# Patient Record
Sex: FEMALE | Race: OTHER | NOT HISPANIC OR LATINO | Employment: OTHER | URBAN - METROPOLITAN AREA
[De-identification: names, ages, dates, MRNs, and addresses within clinical notes are randomized per-mention and may not be internally consistent; named-entity substitution may affect disease eponyms.]

---

## 2017-05-26 ENCOUNTER — HOSPITAL ENCOUNTER (EMERGENCY)
Facility: HOSPITAL | Age: 67
Discharge: HOME | End: 2017-05-26
Attending: EMERGENCY MEDICINE | Admitting: EMERGENCY MEDICINE

## 2017-05-26 ENCOUNTER — APPOINTMENT (OUTPATIENT)
Dept: CT IMAGING | Facility: HOSPITAL | Age: 67
End: 2017-05-26

## 2017-05-26 VITALS
OXYGEN SATURATION: 97 % | HEIGHT: 62 IN | WEIGHT: 150 LBS | DIASTOLIC BLOOD PRESSURE: 77 MMHG | RESPIRATION RATE: 16 BRPM | TEMPERATURE: 98.6 F | SYSTOLIC BLOOD PRESSURE: 108 MMHG | HEART RATE: 72 BPM | BODY MASS INDEX: 27.6 KG/M2

## 2017-05-26 DIAGNOSIS — J10.1 INFLUENZA B: Primary | ICD-10-CM

## 2017-05-26 DIAGNOSIS — R51.9 HEADACHE, UNSPECIFIED HEADACHE TYPE: ICD-10-CM

## 2017-05-26 LAB
ALBUMIN SERPL-MCNC: 4.5 G/DL (ref 3.5–5.2)
ALBUMIN/GLOB SERPL: 1.2 G/DL
ALP SERPL-CCNC: 114 U/L (ref 39–117)
ALT SERPL W P-5'-P-CCNC: 23 U/L (ref 1–33)
ANION GAP SERPL CALCULATED.3IONS-SCNC: 15.6 MMOL/L
APPEARANCE CSF: CLEAR
AST SERPL-CCNC: 28 U/L (ref 1–32)
B PERT DNA SPEC QL NAA+PROBE: NOT DETECTED
BILIRUB SERPL-MCNC: 0.3 MG/DL (ref 0.1–1.2)
BILIRUB UR QL STRIP: NEGATIVE
BUN BLD-MCNC: 12 MG/DL (ref 8–23)
BUN/CREAT SERPL: 11.5 (ref 7–25)
C PNEUM DNA NPH QL NAA+NON-PROBE: NOT DETECTED
CALCIUM SPEC-SCNC: 10.2 MG/DL (ref 8.6–10.5)
CHLORIDE SERPL-SCNC: 96 MMOL/L (ref 98–107)
CLARITY UR: CLEAR
CO2 SERPL-SCNC: 25.4 MMOL/L (ref 22–29)
COLOR CSF: COLORLESS
COLOR UR: YELLOW
CREAT BLD-MCNC: 1.04 MG/DL (ref 0.57–1)
D-LACTATE SERPL-SCNC: 1.1 MMOL/L (ref 0.5–2)
DEPRECATED RDW RBC AUTO: 45.1 FL (ref 37–54)
ERYTHROCYTE [DISTWIDTH] IN BLOOD BY AUTOMATED COUNT: 13.7 % (ref 11.7–13)
FLUAV H1 2009 PAND RNA NPH QL NAA+PROBE: NOT DETECTED
FLUAV H1 HA GENE NPH QL NAA+PROBE: NOT DETECTED
FLUAV H3 RNA NPH QL NAA+PROBE: NOT DETECTED
FLUAV SUBTYP SPEC NAA+PROBE: NOT DETECTED
FLUBV RNA ISLT QL NAA+PROBE: DETECTED
GFR SERPL CREATININE-BSD FRML MDRD: 53 ML/MIN/1.73
GFR SERPL CREATININE-BSD FRML MDRD: 64 ML/MIN/1.73
GLOBULIN UR ELPH-MCNC: 3.9 GM/DL
GLUCOSE BLD-MCNC: 108 MG/DL (ref 65–99)
GLUCOSE CSF-MCNC: 64 MG/DL (ref 40–70)
GLUCOSE UR STRIP-MCNC: NEGATIVE MG/DL
HADV DNA SPEC NAA+PROBE: NOT DETECTED
HCOV 229E RNA SPEC QL NAA+PROBE: NOT DETECTED
HCOV HKU1 RNA SPEC QL NAA+PROBE: NOT DETECTED
HCOV NL63 RNA SPEC QL NAA+PROBE: NOT DETECTED
HCOV OC43 RNA SPEC QL NAA+PROBE: NOT DETECTED
HCT VFR BLD AUTO: 42.1 % (ref 35.6–45.5)
HGB BLD-MCNC: 14 G/DL (ref 11.9–15.5)
HGB UR QL STRIP.AUTO: NEGATIVE
HMPV RNA NPH QL NAA+NON-PROBE: NOT DETECTED
HPIV1 RNA SPEC QL NAA+PROBE: NOT DETECTED
HPIV2 RNA SPEC QL NAA+PROBE: NOT DETECTED
HPIV3 RNA NPH QL NAA+PROBE: NOT DETECTED
HPIV4 P GENE NPH QL NAA+PROBE: NOT DETECTED
KETONES UR QL STRIP: ABNORMAL
LEUKOCYTE ESTERASE UR QL STRIP.AUTO: NEGATIVE
LYMPHOCYTES # BLD MANUAL: 0.68 10*3/MM3 (ref 0.9–4.8)
LYMPHOCYTES NFR BLD MANUAL: 11 % (ref 19.6–45.3)
LYMPHOCYTES NFR BLD MANUAL: 19 % (ref 5–12)
M PNEUMO IGG SER IA-ACNC: NOT DETECTED
MCH RBC QN AUTO: 30.4 PG (ref 26.9–32)
MCHC RBC AUTO-ENTMCNC: 33.3 G/DL (ref 32.4–36.3)
MCV RBC AUTO: 91.3 FL (ref 80.5–98.2)
METHOD: ABNORMAL
MONOCYTES # BLD AUTO: 1.17 10*3/MM3 (ref 0.2–1.2)
NEUTROPHILS # BLD AUTO: 4.32 10*3/MM3 (ref 1.9–8.1)
NEUTROPHILS NFR BLD MANUAL: 70 % (ref 42.7–76)
NITRITE UR QL STRIP: NEGATIVE
NUC CELL # CSF MANUAL: 1 /MM3 (ref 0–5)
PH UR STRIP.AUTO: 6 [PH] (ref 5–8)
PLAT MORPH BLD: NORMAL
PLATELET # BLD AUTO: 220 10*3/MM3 (ref 140–500)
PMV BLD AUTO: 10.7 FL (ref 6–12)
POTASSIUM BLD-SCNC: 4.3 MMOL/L (ref 3.5–5.2)
PROCALCITONIN SERPL-MCNC: 0.11 NG/ML (ref 0.1–0.25)
PROT CSF-MCNC: 28 MG/DL (ref 15–45)
PROT SERPL-MCNC: 8.4 G/DL (ref 6–8.5)
PROT UR QL STRIP: NEGATIVE
RBC # BLD AUTO: 4.61 10*6/MM3 (ref 3.9–5.2)
RBC # CSF MANUAL: 74 /MM3 (ref 0–0)
RBC MORPH BLD: NORMAL
RHINOVIRUS RNA SPEC NAA+PROBE: NOT DETECTED
RSV RNA NPH QL NAA+NON-PROBE: NOT DETECTED
SCAN SLIDE: NORMAL
SODIUM BLD-SCNC: 137 MMOL/L (ref 136–145)
SP GR UR STRIP: 1.01 (ref 1–1.03)
TUBE # CSF: 4
UROBILINOGEN UR QL STRIP: ABNORMAL
WBC MORPH BLD: NORMAL
WBC NRBC COR # BLD: 6.17 10*3/MM3 (ref 4.5–10.7)

## 2017-05-26 PROCEDURE — 87015 SPECIMEN INFECT AGNT CONCNTJ: CPT | Performed by: EMERGENCY MEDICINE

## 2017-05-26 PROCEDURE — 70450 CT HEAD/BRAIN W/O DYE: CPT

## 2017-05-26 PROCEDURE — 96361 HYDRATE IV INFUSION ADD-ON: CPT

## 2017-05-26 PROCEDURE — 96360 HYDRATION IV INFUSION INIT: CPT

## 2017-05-26 PROCEDURE — 85025 COMPLETE CBC W/AUTO DIFF WBC: CPT | Performed by: EMERGENCY MEDICINE

## 2017-05-26 PROCEDURE — 89050 BODY FLUID CELL COUNT: CPT | Performed by: EMERGENCY MEDICINE

## 2017-05-26 PROCEDURE — 84145 PROCALCITONIN (PCT): CPT | Performed by: EMERGENCY MEDICINE

## 2017-05-26 PROCEDURE — 80053 COMPREHEN METABOLIC PANEL: CPT | Performed by: EMERGENCY MEDICINE

## 2017-05-26 PROCEDURE — 87581 M.PNEUMON DNA AMP PROBE: CPT | Performed by: EMERGENCY MEDICINE

## 2017-05-26 PROCEDURE — 81003 URINALYSIS AUTO W/O SCOPE: CPT | Performed by: EMERGENCY MEDICINE

## 2017-05-26 PROCEDURE — 87633 RESP VIRUS 12-25 TARGETS: CPT | Performed by: EMERGENCY MEDICINE

## 2017-05-26 PROCEDURE — 87205 SMEAR GRAM STAIN: CPT | Performed by: EMERGENCY MEDICINE

## 2017-05-26 PROCEDURE — 87798 DETECT AGENT NOS DNA AMP: CPT | Performed by: EMERGENCY MEDICINE

## 2017-05-26 PROCEDURE — 87486 CHLMYD PNEUM DNA AMP PROBE: CPT | Performed by: EMERGENCY MEDICINE

## 2017-05-26 PROCEDURE — 99284 EMERGENCY DEPT VISIT MOD MDM: CPT

## 2017-05-26 PROCEDURE — 85007 BL SMEAR W/DIFF WBC COUNT: CPT | Performed by: EMERGENCY MEDICINE

## 2017-05-26 PROCEDURE — 83605 ASSAY OF LACTIC ACID: CPT | Performed by: EMERGENCY MEDICINE

## 2017-05-26 PROCEDURE — 87070 CULTURE OTHR SPECIMN AEROBIC: CPT | Performed by: EMERGENCY MEDICINE

## 2017-05-26 PROCEDURE — 82945 GLUCOSE OTHER FLUID: CPT | Performed by: EMERGENCY MEDICINE

## 2017-05-26 PROCEDURE — 84157 ASSAY OF PROTEIN OTHER: CPT | Performed by: EMERGENCY MEDICINE

## 2017-05-26 RX ORDER — ACETAMINOPHEN 500 MG
1000 TABLET ORAL ONCE
Status: COMPLETED | OUTPATIENT
Start: 2017-05-26 | End: 2017-05-26

## 2017-05-26 RX ORDER — SODIUM CHLORIDE 0.9 % (FLUSH) 0.9 %
10 SYRINGE (ML) INJECTION AS NEEDED
Status: DISCONTINUED | OUTPATIENT
Start: 2017-05-26 | End: 2017-05-26 | Stop reason: HOSPADM

## 2017-05-26 RX ORDER — SODIUM CHLORIDE 9 MG/ML
125 INJECTION, SOLUTION INTRAVENOUS CONTINUOUS
Status: DISCONTINUED | OUTPATIENT
Start: 2017-05-26 | End: 2017-05-26 | Stop reason: HOSPADM

## 2017-05-26 RX ADMIN — ACETAMINOPHEN 1000 MG: 500 TABLET ORAL at 13:09

## 2017-05-26 RX ADMIN — SODIUM CHLORIDE 125 ML/HR: 9 INJECTION, SOLUTION INTRAVENOUS at 13:05

## 2017-05-29 LAB
BACTERIA SPEC AEROBE CULT: NORMAL
GRAM STN SPEC: NORMAL

## 2017-06-19 ENCOUNTER — TRANSCRIBE ORDERS (OUTPATIENT)
Dept: ADMINISTRATIVE | Facility: HOSPITAL | Age: 67
End: 2017-06-19

## 2017-06-19 ENCOUNTER — HOSPITAL ENCOUNTER (OUTPATIENT)
Dept: CT IMAGING | Facility: HOSPITAL | Age: 67
Discharge: HOME | End: 2017-06-19
Admitting: FAMILY MEDICINE

## 2017-06-19 DIAGNOSIS — C22.1 CHOLANGIOCARCINOMA OF BILIARY TRACT (HCC): Primary | ICD-10-CM

## 2017-06-19 DIAGNOSIS — C22.1 CHOLANGIOCARCINOMA OF BILIARY TRACT (HCC): ICD-10-CM

## 2017-06-19 PROCEDURE — 0 DIATRIZOATE MEGLUMINE & SODIUM PER 1 ML: Performed by: FAMILY MEDICINE

## 2017-06-19 PROCEDURE — 74160 CT ABDOMEN W/CONTRAST: CPT

## 2017-06-19 PROCEDURE — 82565 ASSAY OF CREATININE: CPT

## 2017-06-19 PROCEDURE — 0 IOPAMIDOL 61 % SOLUTION: Performed by: FAMILY MEDICINE

## 2017-06-19 RX ADMIN — DIATRIZOATE MEGLUMINE AND DIATRIZOATE SODIUM 30 ML: 660; 100 LIQUID ORAL; RECTAL at 13:13

## 2017-06-19 RX ADMIN — IOPAMIDOL 85 ML: 612 INJECTION, SOLUTION INTRAVENOUS at 13:13

## 2017-06-20 ENCOUNTER — TRANSCRIBE ORDERS (OUTPATIENT)
Dept: ADMINISTRATIVE | Facility: HOSPITAL | Age: 67
End: 2017-06-20

## 2017-06-20 DIAGNOSIS — C22.1 MALIGNANT NEOPLASM OF INTRAHEPATIC BILE DUCTS (HCC): Primary | ICD-10-CM

## 2017-06-20 LAB — CREAT BLDA-MCNC: 0.9 MG/DL (ref 0.6–1.3)

## 2017-09-04 ENCOUNTER — HOSPITAL ENCOUNTER (OUTPATIENT)
Dept: GENERAL RADIOLOGY | Facility: HOSPITAL | Age: 67
Discharge: HOME | End: 2017-09-04
Admitting: FAMILY MEDICINE

## 2017-09-04 DIAGNOSIS — T14.8XXA CONTUSION OF CLAVICLE, RIGHT, INITIAL ENCOUNTER: ICD-10-CM

## 2017-09-04 PROCEDURE — 73030 X-RAY EXAM OF SHOULDER: CPT

## 2017-09-05 ENCOUNTER — TRANSCRIBE ORDERS (OUTPATIENT)
Dept: ADMINISTRATIVE | Facility: HOSPITAL | Age: 67
End: 2017-09-05

## 2017-09-05 DIAGNOSIS — S46.021A: Primary | ICD-10-CM

## 2017-09-08 ENCOUNTER — HOSPITAL ENCOUNTER (OUTPATIENT)
Dept: MRI IMAGING | Facility: HOSPITAL | Age: 67
Discharge: HOME | End: 2017-09-08
Admitting: FAMILY MEDICINE

## 2017-09-08 DIAGNOSIS — S46.021A: ICD-10-CM

## 2017-09-08 PROCEDURE — 73221 MRI JOINT UPR EXTREM W/O DYE: CPT

## 2017-09-11 ENCOUNTER — APPOINTMENT (OUTPATIENT)
Dept: MRI IMAGING | Facility: HOSPITAL | Age: 67
End: 2017-09-11

## 2017-09-22 ENCOUNTER — TRANSCRIBE ORDERS (OUTPATIENT)
Dept: LAB | Facility: HOSPITAL | Age: 67
End: 2017-09-22

## 2017-09-22 ENCOUNTER — HOSPITAL ENCOUNTER (OUTPATIENT)
Dept: CARDIOLOGY | Facility: HOSPITAL | Age: 67
Discharge: HOME | End: 2017-09-22
Attending: ORTHOPAEDIC SURGERY | Admitting: ORTHOPAEDIC SURGERY

## 2017-09-22 ENCOUNTER — LAB (OUTPATIENT)
Dept: LAB | Facility: HOSPITAL | Age: 67
End: 2017-09-22
Attending: ORTHOPAEDIC SURGERY

## 2017-09-22 DIAGNOSIS — Z01.818 PRE-OP EXAM: ICD-10-CM

## 2017-09-22 DIAGNOSIS — M25.519 SHOULDER PAIN, UNSPECIFIED CHRONICITY, UNSPECIFIED LATERALITY: Primary | ICD-10-CM

## 2017-09-22 DIAGNOSIS — M25.519 SHOULDER PAIN, UNSPECIFIED CHRONICITY, UNSPECIFIED LATERALITY: ICD-10-CM

## 2017-09-22 LAB
ALBUMIN SERPL-MCNC: 4.5 G/DL (ref 3.5–5.2)
ALBUMIN/GLOB SERPL: 1.6 G/DL
ALP SERPL-CCNC: 106 U/L (ref 39–117)
ALT SERPL W P-5'-P-CCNC: 19 U/L (ref 1–33)
ANION GAP SERPL CALCULATED.3IONS-SCNC: 14.6 MMOL/L
AST SERPL-CCNC: 21 U/L (ref 1–32)
BASOPHILS # BLD AUTO: 0.02 10*3/MM3 (ref 0–0.2)
BASOPHILS NFR BLD AUTO: 0.3 % (ref 0–1.5)
BILIRUB SERPL-MCNC: 0.5 MG/DL (ref 0.1–1.2)
BUN BLD-MCNC: 15 MG/DL (ref 8–23)
BUN/CREAT SERPL: 14.9 (ref 7–25)
CALCIUM SPEC-SCNC: 10.3 MG/DL (ref 8.6–10.5)
CHLORIDE SERPL-SCNC: 99 MMOL/L (ref 98–107)
CO2 SERPL-SCNC: 25.4 MMOL/L (ref 22–29)
CREAT BLD-MCNC: 1.01 MG/DL (ref 0.57–1)
DEPRECATED RDW RBC AUTO: 43.6 FL (ref 37–54)
EOSINOPHIL # BLD AUTO: 0.33 10*3/MM3 (ref 0–0.7)
EOSINOPHIL NFR BLD AUTO: 4.6 % (ref 0.3–6.2)
ERYTHROCYTE [DISTWIDTH] IN BLOOD BY AUTOMATED COUNT: 13.3 % (ref 11.7–13)
GFR SERPL CREATININE-BSD FRML MDRD: 55 ML/MIN/1.73
GFR SERPL CREATININE-BSD FRML MDRD: 66 ML/MIN/1.73
GLOBULIN UR ELPH-MCNC: 2.9 GM/DL
GLUCOSE BLD-MCNC: 90 MG/DL (ref 65–99)
HCT VFR BLD AUTO: 38.9 % (ref 35.6–45.5)
HGB BLD-MCNC: 12.7 G/DL (ref 11.9–15.5)
IMM GRANULOCYTES # BLD: 0.03 10*3/MM3 (ref 0–0.03)
IMM GRANULOCYTES NFR BLD: 0.4 % (ref 0–0.5)
LYMPHOCYTES # BLD AUTO: 2.22 10*3/MM3 (ref 0.9–4.8)
LYMPHOCYTES NFR BLD AUTO: 31.1 % (ref 19.6–45.3)
MCH RBC QN AUTO: 29.2 PG (ref 26.9–32)
MCHC RBC AUTO-ENTMCNC: 32.6 G/DL (ref 32.4–36.3)
MCV RBC AUTO: 89.4 FL (ref 80.5–98.2)
MONOCYTES # BLD AUTO: 0.93 10*3/MM3 (ref 0.2–1.2)
MONOCYTES NFR BLD AUTO: 13 % (ref 5–12)
NEUTROPHILS # BLD AUTO: 3.61 10*3/MM3 (ref 1.9–8.1)
NEUTROPHILS NFR BLD AUTO: 50.6 % (ref 42.7–76)
PLATELET # BLD AUTO: 290 10*3/MM3 (ref 140–500)
PMV BLD AUTO: 10.1 FL (ref 6–12)
POTASSIUM BLD-SCNC: 4.5 MMOL/L (ref 3.5–5.2)
PROT SERPL-MCNC: 7.4 G/DL (ref 6–8.5)
RBC # BLD AUTO: 4.35 10*6/MM3 (ref 3.9–5.2)
SODIUM BLD-SCNC: 139 MMOL/L (ref 136–145)
WBC NRBC COR # BLD: 7.14 10*3/MM3 (ref 4.5–10.7)

## 2017-09-22 PROCEDURE — 36415 COLL VENOUS BLD VENIPUNCTURE: CPT

## 2017-09-22 PROCEDURE — 80053 COMPREHEN METABOLIC PANEL: CPT

## 2017-09-22 PROCEDURE — 85025 COMPLETE CBC W/AUTO DIFF WBC: CPT

## 2017-09-22 PROCEDURE — 93010 ELECTROCARDIOGRAM REPORT: CPT | Performed by: INTERNAL MEDICINE

## 2017-09-22 PROCEDURE — 93005 ELECTROCARDIOGRAM TRACING: CPT | Performed by: ORTHOPAEDIC SURGERY

## 2017-09-26 RX ORDER — CEFAZOLIN SODIUM 2 G/100ML
2 INJECTION, SOLUTION INTRAVENOUS ONCE
Status: COMPLETED | OUTPATIENT
Start: 2017-09-26 | End: 2017-09-28

## 2017-09-28 ENCOUNTER — ANESTHESIA (OUTPATIENT)
Dept: PERIOP | Facility: HOSPITAL | Age: 67
End: 2017-09-28

## 2017-09-28 ENCOUNTER — ANESTHESIA EVENT (OUTPATIENT)
Dept: PERIOP | Facility: HOSPITAL | Age: 67
End: 2017-09-28

## 2017-09-28 ENCOUNTER — HOSPITAL ENCOUNTER (OUTPATIENT)
Facility: HOSPITAL | Age: 67
Setting detail: HOSPITAL OUTPATIENT SURGERY
Discharge: HOME | End: 2017-09-28
Attending: ORTHOPAEDIC SURGERY | Admitting: ORTHOPAEDIC SURGERY

## 2017-09-28 VITALS
TEMPERATURE: 97.6 F | SYSTOLIC BLOOD PRESSURE: 150 MMHG | HEART RATE: 61 BPM | RESPIRATION RATE: 18 BRPM | DIASTOLIC BLOOD PRESSURE: 86 MMHG | OXYGEN SATURATION: 95 %

## 2017-09-28 PROCEDURE — 25010000002 ONDANSETRON PER 1 MG: Performed by: ANESTHESIOLOGY

## 2017-09-28 PROCEDURE — 25010000002 KETOROLAC TROMETHAMINE PER 15 MG

## 2017-09-28 PROCEDURE — 25010000002 ONDANSETRON PER 1 MG: Performed by: NURSE ANESTHETIST, CERTIFIED REGISTERED

## 2017-09-28 PROCEDURE — 25010000002 EPINEPHRINE PER 0.1 MG: Performed by: ORTHOPAEDIC SURGERY

## 2017-09-28 PROCEDURE — 25010000002 PROPOFOL 10 MG/ML EMULSION: Performed by: NURSE ANESTHETIST, CERTIFIED REGISTERED

## 2017-09-28 PROCEDURE — 25010000002 FENTANYL CITRATE (PF) 100 MCG/2ML SOLUTION: Performed by: ANESTHESIOLOGY

## 2017-09-28 PROCEDURE — 25010000002 MIDAZOLAM PER 1 MG: Performed by: ANESTHESIOLOGY

## 2017-09-28 PROCEDURE — 25010000002 METHYLPREDNISOLONE PER 125 MG: Performed by: ANESTHESIOLOGY

## 2017-09-28 PROCEDURE — 25010000003 CEFAZOLIN IN DEXTROSE 2-4 GM/100ML-% SOLUTION: Performed by: ORTHOPAEDIC SURGERY

## 2017-09-28 PROCEDURE — C1713 ANCHOR/SCREW BN/BN,TIS/BN: HCPCS | Performed by: ORTHOPAEDIC SURGERY

## 2017-09-28 PROCEDURE — 25010000002 ROPIVACAINE PER 1 MG: Performed by: ANESTHESIOLOGY

## 2017-09-28 PROCEDURE — L3670 SO ACRO/CLAV CAN WEB PRE OTS: HCPCS | Performed by: ORTHOPAEDIC SURGERY

## 2017-09-28 PROCEDURE — 25010000002 NEOSTIGMINE PER 0.5 MG: Performed by: NURSE ANESTHETIST, CERTIFIED REGISTERED

## 2017-09-28 DEVICE — SUT/ANCH JUGGERKNOT SFT NO2 MAXBRAID 2.9MM 10LOAD: Type: IMPLANTABLE DEVICE | Site: SHOULDER | Status: FUNCTIONAL

## 2017-09-28 RX ORDER — PROMETHAZINE HYDROCHLORIDE 25 MG/ML
6.25 INJECTION, SOLUTION INTRAMUSCULAR; INTRAVENOUS ONCE AS NEEDED
Status: DISCONTINUED | OUTPATIENT
Start: 2017-09-28 | End: 2017-09-28 | Stop reason: HOSPADM

## 2017-09-28 RX ORDER — FAMOTIDINE 10 MG/ML
20 INJECTION, SOLUTION INTRAVENOUS ONCE
Status: COMPLETED | OUTPATIENT
Start: 2017-09-28 | End: 2017-09-28

## 2017-09-28 RX ORDER — FLUMAZENIL 0.1 MG/ML
0.2 INJECTION INTRAVENOUS AS NEEDED
Status: DISCONTINUED | OUTPATIENT
Start: 2017-09-28 | End: 2017-09-28 | Stop reason: HOSPADM

## 2017-09-28 RX ORDER — ONDANSETRON 2 MG/ML
INJECTION INTRAMUSCULAR; INTRAVENOUS AS NEEDED
Status: DISCONTINUED | OUTPATIENT
Start: 2017-09-28 | End: 2017-09-28 | Stop reason: SURG

## 2017-09-28 RX ORDER — HYDRALAZINE HYDROCHLORIDE 20 MG/ML
5 INJECTION INTRAMUSCULAR; INTRAVENOUS
Status: DISCONTINUED | OUTPATIENT
Start: 2017-09-28 | End: 2017-09-28 | Stop reason: HOSPADM

## 2017-09-28 RX ORDER — ROPIVACAINE HYDROCHLORIDE 5 MG/ML
INJECTION, SOLUTION EPIDURAL; INFILTRATION; PERINEURAL AS NEEDED
Status: DISCONTINUED | OUTPATIENT
Start: 2017-09-28 | End: 2017-09-28 | Stop reason: SURG

## 2017-09-28 RX ORDER — HYDROCODONE BITARTRATE AND ACETAMINOPHEN 7.5; 325 MG/1; MG/1
1 TABLET ORAL ONCE AS NEEDED
Status: DISCONTINUED | OUTPATIENT
Start: 2017-09-28 | End: 2017-09-28 | Stop reason: HOSPADM

## 2017-09-28 RX ORDER — MIDAZOLAM HYDROCHLORIDE 1 MG/ML
2 INJECTION INTRAMUSCULAR; INTRAVENOUS
Status: DISCONTINUED | OUTPATIENT
Start: 2017-09-28 | End: 2017-09-28 | Stop reason: HOSPADM

## 2017-09-28 RX ORDER — LIDOCAINE HYDROCHLORIDE AND EPINEPHRINE 20; 5 MG/ML; UG/ML
INJECTION, SOLUTION EPIDURAL; INFILTRATION; INTRACAUDAL; PERINEURAL AS NEEDED
Status: DISCONTINUED | OUTPATIENT
Start: 2017-09-28 | End: 2017-09-28 | Stop reason: SURG

## 2017-09-28 RX ORDER — OXYCODONE HYDROCHLORIDE AND ACETAMINOPHEN 5; 325 MG/1; MG/1
2 TABLET ORAL ONCE AS NEEDED
Status: COMPLETED | OUTPATIENT
Start: 2017-09-28 | End: 2017-09-28

## 2017-09-28 RX ORDER — GLYCOPYRROLATE 0.2 MG/ML
INJECTION INTRAMUSCULAR; INTRAVENOUS AS NEEDED
Status: DISCONTINUED | OUTPATIENT
Start: 2017-09-28 | End: 2017-09-28 | Stop reason: SURG

## 2017-09-28 RX ORDER — LIDOCAINE HYDROCHLORIDE 10 MG/ML
0.5 INJECTION, SOLUTION EPIDURAL; INFILTRATION; INTRACAUDAL; PERINEURAL ONCE AS NEEDED
Status: DISCONTINUED | OUTPATIENT
Start: 2017-09-28 | End: 2017-09-28 | Stop reason: HOSPADM

## 2017-09-28 RX ORDER — FENTANYL CITRATE 50 UG/ML
50 INJECTION, SOLUTION INTRAMUSCULAR; INTRAVENOUS
Status: DISCONTINUED | OUTPATIENT
Start: 2017-09-28 | End: 2017-09-28 | Stop reason: HOSPADM

## 2017-09-28 RX ORDER — MIDAZOLAM HYDROCHLORIDE 1 MG/ML
1 INJECTION INTRAMUSCULAR; INTRAVENOUS
Status: DISCONTINUED | OUTPATIENT
Start: 2017-09-28 | End: 2017-09-28 | Stop reason: HOSPADM

## 2017-09-28 RX ORDER — KETOROLAC TROMETHAMINE 30 MG/ML
INJECTION, SOLUTION INTRAMUSCULAR; INTRAVENOUS
Status: COMPLETED
Start: 2017-09-28 | End: 2017-09-28

## 2017-09-28 RX ORDER — LIDOCAINE HYDROCHLORIDE 20 MG/ML
INJECTION, SOLUTION INFILTRATION; PERINEURAL AS NEEDED
Status: DISCONTINUED | OUTPATIENT
Start: 2017-09-28 | End: 2017-09-28 | Stop reason: SURG

## 2017-09-28 RX ORDER — PROPOFOL 10 MG/ML
VIAL (ML) INTRAVENOUS AS NEEDED
Status: DISCONTINUED | OUTPATIENT
Start: 2017-09-28 | End: 2017-09-28 | Stop reason: SURG

## 2017-09-28 RX ORDER — SODIUM CHLORIDE 0.9 % (FLUSH) 0.9 %
1-10 SYRINGE (ML) INJECTION AS NEEDED
Status: DISCONTINUED | OUTPATIENT
Start: 2017-09-28 | End: 2017-09-28 | Stop reason: HOSPADM

## 2017-09-28 RX ORDER — DIPHENHYDRAMINE HYDROCHLORIDE 50 MG/ML
6.25 INJECTION INTRAMUSCULAR; INTRAVENOUS
Status: DISCONTINUED | OUTPATIENT
Start: 2017-09-28 | End: 2017-09-28 | Stop reason: HOSPADM

## 2017-09-28 RX ORDER — OXYCODONE HYDROCHLORIDE AND ACETAMINOPHEN 5; 325 MG/1; MG/1
1 TABLET ORAL EVERY 4 HOURS PRN
Qty: 40 TABLET | Refills: 0 | Status: SHIPPED | OUTPATIENT
Start: 2017-09-28 | End: 2018-07-05

## 2017-09-28 RX ORDER — HYDROMORPHONE HYDROCHLORIDE 1 MG/ML
0.5 INJECTION, SOLUTION INTRAMUSCULAR; INTRAVENOUS; SUBCUTANEOUS
Status: DISCONTINUED | OUTPATIENT
Start: 2017-09-28 | End: 2017-09-28 | Stop reason: HOSPADM

## 2017-09-28 RX ORDER — LIDOCAINE HYDROCHLORIDE 10 MG/ML
INJECTION, SOLUTION INFILTRATION; PERINEURAL AS NEEDED
Status: DISCONTINUED | OUTPATIENT
Start: 2017-09-28 | End: 2017-09-28 | Stop reason: SURG

## 2017-09-28 RX ORDER — FENTANYL CITRATE 50 UG/ML
100 INJECTION, SOLUTION INTRAMUSCULAR; INTRAVENOUS
Status: DISCONTINUED | OUTPATIENT
Start: 2017-09-28 | End: 2017-09-28 | Stop reason: HOSPADM

## 2017-09-28 RX ORDER — ONDANSETRON 2 MG/ML
4 INJECTION INTRAMUSCULAR; INTRAVENOUS ONCE AS NEEDED
Status: COMPLETED | OUTPATIENT
Start: 2017-09-28 | End: 2017-09-28

## 2017-09-28 RX ORDER — SODIUM CHLORIDE, SODIUM LACTATE, POTASSIUM CHLORIDE, CALCIUM CHLORIDE 600; 310; 30; 20 MG/100ML; MG/100ML; MG/100ML; MG/100ML
9 INJECTION, SOLUTION INTRAVENOUS CONTINUOUS
Status: DISCONTINUED | OUTPATIENT
Start: 2017-09-28 | End: 2017-09-28 | Stop reason: HOSPADM

## 2017-09-28 RX ORDER — EPHEDRINE SULFATE 50 MG/ML
INJECTION, SOLUTION INTRAVENOUS AS NEEDED
Status: DISCONTINUED | OUTPATIENT
Start: 2017-09-28 | End: 2017-09-28 | Stop reason: SURG

## 2017-09-28 RX ORDER — LABETALOL HYDROCHLORIDE 5 MG/ML
5 INJECTION, SOLUTION INTRAVENOUS
Status: DISCONTINUED | OUTPATIENT
Start: 2017-09-28 | End: 2017-09-28 | Stop reason: HOSPADM

## 2017-09-28 RX ORDER — METHYLPREDNISOLONE SODIUM SUCCINATE 125 MG/2ML
INJECTION, POWDER, LYOPHILIZED, FOR SOLUTION INTRAMUSCULAR; INTRAVENOUS AS NEEDED
Status: DISCONTINUED | OUTPATIENT
Start: 2017-09-28 | End: 2017-09-28 | Stop reason: SURG

## 2017-09-28 RX ORDER — ROCURONIUM BROMIDE 10 MG/ML
INJECTION, SOLUTION INTRAVENOUS AS NEEDED
Status: DISCONTINUED | OUTPATIENT
Start: 2017-09-28 | End: 2017-09-28 | Stop reason: SURG

## 2017-09-28 RX ORDER — EPHEDRINE SULFATE 50 MG/ML
5 INJECTION, SOLUTION INTRAVENOUS ONCE AS NEEDED
Status: DISCONTINUED | OUTPATIENT
Start: 2017-09-28 | End: 2017-09-28 | Stop reason: HOSPADM

## 2017-09-28 RX ADMIN — FAMOTIDINE 20 MG: 10 INJECTION, SOLUTION INTRAVENOUS at 10:54

## 2017-09-28 RX ADMIN — MIDAZOLAM 2 MG: 1 INJECTION INTRAMUSCULAR; INTRAVENOUS at 11:34

## 2017-09-28 RX ADMIN — PROPOFOL 200 MG: 10 INJECTION, EMULSION INTRAVENOUS at 13:12

## 2017-09-28 RX ADMIN — CEFAZOLIN SODIUM 2 G: 2 INJECTION, SOLUTION INTRAVENOUS at 13:12

## 2017-09-28 RX ADMIN — GLYCOPYRROLATE 0.4 MG: 0.2 INJECTION INTRAMUSCULAR; INTRAVENOUS at 14:08

## 2017-09-28 RX ADMIN — FENTANYL CITRATE 50 MCG: 50 INJECTION INTRAMUSCULAR; INTRAVENOUS at 11:34

## 2017-09-28 RX ADMIN — NEOSTIGMINE METHYLSULFATE 2 MG: 1 INJECTION INTRAMUSCULAR; INTRAVENOUS; SUBCUTANEOUS at 14:17

## 2017-09-28 RX ADMIN — LIDOCAINE HYDROCHLORIDE 5 ML: 10 INJECTION, SOLUTION INFILTRATION; PERINEURAL at 10:55

## 2017-09-28 RX ADMIN — SODIUM CHLORIDE, POTASSIUM CHLORIDE, SODIUM LACTATE AND CALCIUM CHLORIDE 9 ML/HR: 600; 310; 30; 20 INJECTION, SOLUTION INTRAVENOUS at 10:55

## 2017-09-28 RX ADMIN — ONDANSETRON 4 MG: 2 INJECTION INTRAMUSCULAR; INTRAVENOUS at 14:51

## 2017-09-28 RX ADMIN — NEOSTIGMINE METHYLSULFATE 2.5 MG: 1 INJECTION INTRAMUSCULAR; INTRAVENOUS; SUBCUTANEOUS at 14:08

## 2017-09-28 RX ADMIN — OXYCODONE HYDROCHLORIDE AND ACETAMINOPHEN 1 TABLET: 5; 325 TABLET ORAL at 15:28

## 2017-09-28 RX ADMIN — FENTANYL CITRATE 50 MCG: 50 INJECTION INTRAMUSCULAR; INTRAVENOUS at 15:16

## 2017-09-28 RX ADMIN — FENTANYL CITRATE 50 MCG: 50 INJECTION INTRAMUSCULAR; INTRAVENOUS at 15:30

## 2017-09-28 RX ADMIN — LIDOCAINE HYDROCHLORIDE 40 MG: 20 INJECTION, SOLUTION INFILTRATION; PERINEURAL at 13:12

## 2017-09-28 RX ADMIN — ROPIVACAINE HYDROCHLORIDE 20 ML: 5 INJECTION, SOLUTION EPIDURAL; INFILTRATION; PERINEURAL at 10:55

## 2017-09-28 RX ADMIN — KETOROLAC TROMETHAMINE 30 MG: 30 INJECTION, SOLUTION INTRAMUSCULAR at 15:22

## 2017-09-28 RX ADMIN — METHYLPREDNISOLONE SODIUM SUCCINATE 40 MG: 125 INJECTION, POWDER, FOR SOLUTION INTRAMUSCULAR; INTRAVENOUS at 10:55

## 2017-09-28 RX ADMIN — EPHEDRINE SULFATE 10 MG: 50 INJECTION INTRAMUSCULAR; INTRAVENOUS; SUBCUTANEOUS at 13:28

## 2017-09-28 RX ADMIN — LIDOCAINE HYDROCHLORIDE AND EPINEPHRINE 5 ML: 20; 5 INJECTION, SOLUTION EPIDURAL; INFILTRATION; INTRACAUDAL; PERINEURAL at 10:55

## 2017-09-28 RX ADMIN — ROCURONIUM BROMIDE 30 MG: 10 INJECTION INTRAVENOUS at 13:13

## 2017-09-28 RX ADMIN — SODIUM CHLORIDE, POTASSIUM CHLORIDE, SODIUM LACTATE AND CALCIUM CHLORIDE: 600; 310; 30; 20 INJECTION, SOLUTION INTRAVENOUS at 13:30

## 2017-09-28 RX ADMIN — ONDANSETRON 4 MG: 2 INJECTION INTRAMUSCULAR; INTRAVENOUS at 13:25

## 2018-05-14 ENCOUNTER — TRANSCRIBE ORDERS (OUTPATIENT)
Dept: ADMINISTRATIVE | Facility: HOSPITAL | Age: 68
End: 2018-05-14

## 2018-05-14 ENCOUNTER — HOSPITAL ENCOUNTER (OUTPATIENT)
Dept: CARDIOLOGY | Facility: HOSPITAL | Age: 68
Discharge: HOME | End: 2018-05-14
Admitting: FAMILY MEDICINE

## 2018-05-14 DIAGNOSIS — M79.662 PAIN OF LEFT CALF: Primary | ICD-10-CM

## 2018-05-14 DIAGNOSIS — M79.662 PAIN OF LEFT CALF: ICD-10-CM

## 2018-05-14 LAB
BH CV LOW VAS LEFT LESSER SAPH VESSEL: 1
BH CV LOWER VASCULAR LEFT COMMON FEMORAL AUGMENT: NORMAL
BH CV LOWER VASCULAR LEFT COMMON FEMORAL COMPETENT: NORMAL
BH CV LOWER VASCULAR LEFT COMMON FEMORAL COMPRESS: NORMAL
BH CV LOWER VASCULAR LEFT COMMON FEMORAL PHASIC: NORMAL
BH CV LOWER VASCULAR LEFT COMMON FEMORAL SPONT: NORMAL
BH CV LOWER VASCULAR LEFT DISTAL FEMORAL COMPRESS: NORMAL
BH CV LOWER VASCULAR LEFT GASTRONEMIUS COMPRESS: NORMAL
BH CV LOWER VASCULAR LEFT GREATER SAPH AK COMPRESS: NORMAL
BH CV LOWER VASCULAR LEFT GREATER SAPH BK COMPRESS: NORMAL
BH CV LOWER VASCULAR LEFT LESSER SAPH COMPRESS: NORMAL
BH CV LOWER VASCULAR LEFT LESSER SAPH THROMBUS: NORMAL
BH CV LOWER VASCULAR LEFT MID FEMORAL AUGMENT: NORMAL
BH CV LOWER VASCULAR LEFT MID FEMORAL COMPETENT: NORMAL
BH CV LOWER VASCULAR LEFT MID FEMORAL COMPRESS: NORMAL
BH CV LOWER VASCULAR LEFT MID FEMORAL PHASIC: NORMAL
BH CV LOWER VASCULAR LEFT MID FEMORAL SPONT: NORMAL
BH CV LOWER VASCULAR LEFT PERONEAL COMPRESS: NORMAL
BH CV LOWER VASCULAR LEFT POPLITEAL AUGMENT: NORMAL
BH CV LOWER VASCULAR LEFT POPLITEAL COMPETENT: NORMAL
BH CV LOWER VASCULAR LEFT POPLITEAL COMPRESS: NORMAL
BH CV LOWER VASCULAR LEFT POPLITEAL PHASIC: NORMAL
BH CV LOWER VASCULAR LEFT POPLITEAL SPONT: NORMAL
BH CV LOWER VASCULAR LEFT POSTERIOR TIBIAL COMPRESS: NORMAL
BH CV LOWER VASCULAR LEFT PROXIMAL FEMORAL COMPRESS: NORMAL
BH CV LOWER VASCULAR LEFT SAPHENOFEMORAL JUNCTION AUGMENT: NORMAL
BH CV LOWER VASCULAR LEFT SAPHENOFEMORAL JUNCTION COMPETENT: NORMAL
BH CV LOWER VASCULAR LEFT SAPHENOFEMORAL JUNCTION COMPRESS: NORMAL
BH CV LOWER VASCULAR LEFT SAPHENOFEMORAL JUNCTION PHASIC: NORMAL
BH CV LOWER VASCULAR LEFT SAPHENOFEMORAL JUNCTION SPONT: NORMAL
BH CV LOWER VASCULAR RIGHT COMMON FEMORAL AUGMENT: NORMAL
BH CV LOWER VASCULAR RIGHT COMMON FEMORAL COMPETENT: NORMAL
BH CV LOWER VASCULAR RIGHT COMMON FEMORAL COMPRESS: NORMAL
BH CV LOWER VASCULAR RIGHT COMMON FEMORAL PHASIC: NORMAL
BH CV LOWER VASCULAR RIGHT COMMON FEMORAL SPONT: NORMAL

## 2018-05-14 PROCEDURE — 93971 EXTREMITY STUDY: CPT

## 2018-06-04 ENCOUNTER — TRANSCRIBE ORDERS (OUTPATIENT)
Dept: ADMINISTRATIVE | Facility: HOSPITAL | Age: 68
End: 2018-06-04

## 2018-06-04 DIAGNOSIS — M54.16 LUMBAR RADICULOPATHY: Primary | ICD-10-CM

## 2018-06-13 ENCOUNTER — HOSPITAL ENCOUNTER (OUTPATIENT)
Dept: MRI IMAGING | Facility: HOSPITAL | Age: 68
Discharge: HOME | End: 2018-06-13
Admitting: FAMILY MEDICINE

## 2018-06-13 DIAGNOSIS — M54.16 LUMBAR RADICULOPATHY: ICD-10-CM

## 2018-06-13 PROCEDURE — 72158 MRI LUMBAR SPINE W/O & W/DYE: CPT

## 2018-06-13 PROCEDURE — 82565 ASSAY OF CREATININE: CPT

## 2018-06-13 PROCEDURE — A9577 INJ MULTIHANCE: HCPCS | Performed by: FAMILY MEDICINE

## 2018-06-13 PROCEDURE — 0 GADOBENATE DIMEGLUMINE 529 MG/ML SOLUTION: Performed by: FAMILY MEDICINE

## 2018-06-13 RX ADMIN — GADOBENATE DIMEGLUMINE 14 ML: 529 INJECTION, SOLUTION INTRAVENOUS at 18:42

## 2018-06-14 LAB — CREAT BLDA-MCNC: 0.9 MG/DL (ref 0.6–1.3)

## 2018-06-20 ENCOUNTER — RESULTS ENCOUNTER (OUTPATIENT)
Dept: PAIN MEDICINE | Facility: CLINIC | Age: 68
End: 2018-06-20

## 2018-06-20 ENCOUNTER — OFFICE VISIT (OUTPATIENT)
Dept: PAIN MEDICINE | Facility: CLINIC | Age: 68
End: 2018-06-20

## 2018-06-20 VITALS
DIASTOLIC BLOOD PRESSURE: 88 MMHG | WEIGHT: 148 LBS | BODY MASS INDEX: 27.23 KG/M2 | HEART RATE: 65 BPM | RESPIRATION RATE: 16 BRPM | OXYGEN SATURATION: 99 % | TEMPERATURE: 98.6 F | HEIGHT: 62 IN | SYSTOLIC BLOOD PRESSURE: 125 MMHG

## 2018-06-20 DIAGNOSIS — M53.3 SACROILIAC JOINT DYSFUNCTION OF LEFT SIDE: Primary | ICD-10-CM

## 2018-06-20 DIAGNOSIS — M54.16 LEFT LUMBAR RADICULITIS: ICD-10-CM

## 2018-06-20 DIAGNOSIS — G89.29 OTHER CHRONIC PAIN: ICD-10-CM

## 2018-06-20 DIAGNOSIS — M53.3 SACROILIAC JOINT DYSFUNCTION OF LEFT SIDE: ICD-10-CM

## 2018-06-20 LAB
POC AMPHETAMINES: NEGATIVE
POC BARBITURATES: NEGATIVE
POC BENZODIAZEPHINES: NEGATIVE
POC COCAINE: NEGATIVE
POC METHADONE: NEGATIVE
POC METHAMPHETAMINE SCREEN URINE: NEGATIVE
POC OPIATES: NEGATIVE
POC OXYCODONE: NEGATIVE
POC PHENCYCLIDINE: NEGATIVE
POC PROPOXYPHENE: NEGATIVE
POC THC: NEGATIVE
POC TRICYCLIC ANTIDEPRESSANTS: NEGATIVE

## 2018-06-20 PROCEDURE — 80305 DRUG TEST PRSMV DIR OPT OBS: CPT | Performed by: ANESTHESIOLOGY

## 2018-06-20 PROCEDURE — 99204 OFFICE O/P NEW MOD 45 MIN: CPT | Performed by: ANESTHESIOLOGY

## 2018-06-20 RX ORDER — FERROUS SULFATE 325(65) MG
325 TABLET ORAL
COMMUNITY
End: 2022-07-26

## 2018-06-26 ENCOUNTER — DOCUMENTATION (OUTPATIENT)
Dept: PAIN MEDICINE | Facility: CLINIC | Age: 68
End: 2018-06-26

## 2018-06-26 ENCOUNTER — OUTSIDE FACILITY SERVICE (OUTPATIENT)
Dept: PAIN MEDICINE | Facility: CLINIC | Age: 68
End: 2018-06-26

## 2018-06-26 PROCEDURE — 27096 INJECT SACROILIAC JOINT: CPT | Performed by: ANESTHESIOLOGY

## 2018-07-05 ENCOUNTER — OFFICE VISIT (OUTPATIENT)
Dept: PAIN MEDICINE | Facility: CLINIC | Age: 68
End: 2018-07-05

## 2018-07-05 VITALS
SYSTOLIC BLOOD PRESSURE: 150 MMHG | DIASTOLIC BLOOD PRESSURE: 94 MMHG | RESPIRATION RATE: 15 BRPM | HEART RATE: 88 BPM | WEIGHT: 148 LBS | OXYGEN SATURATION: 100 % | HEIGHT: 62 IN | BODY MASS INDEX: 27.23 KG/M2 | TEMPERATURE: 96.9 F

## 2018-07-05 DIAGNOSIS — M53.3 SACROILIAC JOINT DYSFUNCTION OF LEFT SIDE: ICD-10-CM

## 2018-07-05 DIAGNOSIS — G89.29 OTHER CHRONIC PAIN: Primary | ICD-10-CM

## 2018-07-05 DIAGNOSIS — M54.16 LEFT LUMBAR RADICULOPATHY: ICD-10-CM

## 2018-07-05 PROCEDURE — 99214 OFFICE O/P EST MOD 30 MIN: CPT | Performed by: ANESTHESIOLOGY

## 2018-07-05 RX ORDER — HYDROCODONE BITARTRATE AND ACETAMINOPHEN 5; 325 MG/1; MG/1
TABLET ORAL
Refills: 0 | COMMUNITY
Start: 2018-05-14 | End: 2018-08-22

## 2018-07-05 RX ORDER — LORATADINE 10 MG/1
TABLET ORAL DAILY
Refills: 4 | COMMUNITY
Start: 2018-06-18 | End: 2018-12-21

## 2018-07-10 ENCOUNTER — OUTSIDE FACILITY SERVICE (OUTPATIENT)
Dept: PAIN MEDICINE | Facility: CLINIC | Age: 68
End: 2018-07-10

## 2018-07-10 ENCOUNTER — DOCUMENTATION (OUTPATIENT)
Dept: PAIN MEDICINE | Facility: CLINIC | Age: 68
End: 2018-07-10

## 2018-07-10 PROCEDURE — 64484 NJX AA&/STRD TFRM EPI L/S EA: CPT | Performed by: ANESTHESIOLOGY

## 2018-07-10 PROCEDURE — 64483 NJX AA&/STRD TFRM EPI L/S 1: CPT | Performed by: ANESTHESIOLOGY

## 2018-07-11 ENCOUNTER — TELEPHONE (OUTPATIENT)
Dept: PAIN MEDICINE | Facility: CLINIC | Age: 68
End: 2018-07-11

## 2018-07-24 ENCOUNTER — DOCUMENTATION (OUTPATIENT)
Dept: PAIN MEDICINE | Facility: CLINIC | Age: 68
End: 2018-07-24

## 2018-07-24 ENCOUNTER — OUTSIDE FACILITY SERVICE (OUTPATIENT)
Dept: PAIN MEDICINE | Facility: CLINIC | Age: 68
End: 2018-07-24

## 2018-07-24 PROCEDURE — 64483 NJX AA&/STRD TFRM EPI L/S 1: CPT | Performed by: ANESTHESIOLOGY

## 2018-07-24 PROCEDURE — 64484 NJX AA&/STRD TFRM EPI L/S EA: CPT | Performed by: ANESTHESIOLOGY

## 2018-08-22 ENCOUNTER — OFFICE VISIT (OUTPATIENT)
Dept: PAIN MEDICINE | Facility: CLINIC | Age: 68
End: 2018-08-22

## 2018-08-22 VITALS
WEIGHT: 151.4 LBS | OXYGEN SATURATION: 95 % | HEART RATE: 84 BPM | BODY MASS INDEX: 27.86 KG/M2 | RESPIRATION RATE: 15 BRPM | TEMPERATURE: 96.8 F | SYSTOLIC BLOOD PRESSURE: 137 MMHG | DIASTOLIC BLOOD PRESSURE: 95 MMHG | HEIGHT: 62 IN

## 2018-08-22 DIAGNOSIS — G89.29 OTHER CHRONIC PAIN: Primary | ICD-10-CM

## 2018-08-22 DIAGNOSIS — M53.3 SACROILIAC JOINT DYSFUNCTION OF LEFT SIDE: ICD-10-CM

## 2018-08-22 DIAGNOSIS — M54.16 LEFT LUMBAR RADICULOPATHY: ICD-10-CM

## 2018-08-22 DIAGNOSIS — M51.36 DDD (DEGENERATIVE DISC DISEASE), LUMBAR: ICD-10-CM

## 2018-08-22 PROCEDURE — 99214 OFFICE O/P EST MOD 30 MIN: CPT | Performed by: NURSE PRACTITIONER

## 2018-08-22 RX ORDER — GABAPENTIN 300 MG/1
300 CAPSULE ORAL 3 TIMES DAILY
Qty: 90 CAPSULE | Refills: 2 | Status: SHIPPED | OUTPATIENT
Start: 2018-08-22

## 2018-08-27 ENCOUNTER — HOSPITAL ENCOUNTER (OUTPATIENT)
Dept: PHYSICAL THERAPY | Facility: HOSPITAL | Age: 68
Setting detail: THERAPIES SERIES
Discharge: HOME | End: 2018-08-27

## 2018-08-27 DIAGNOSIS — M54.42 CHRONIC MIDLINE LOW BACK PAIN WITH LEFT-SIDED SCIATICA: Primary | ICD-10-CM

## 2018-08-27 DIAGNOSIS — M25.552 LEFT HIP PAIN: ICD-10-CM

## 2018-08-27 DIAGNOSIS — G89.29 CHRONIC MIDLINE LOW BACK PAIN WITH LEFT-SIDED SCIATICA: Primary | ICD-10-CM

## 2018-08-27 PROCEDURE — 97161 PT EVAL LOW COMPLEX 20 MIN: CPT

## 2018-08-27 PROCEDURE — G8981 BODY POS CURRENT STATUS: HCPCS

## 2018-08-27 PROCEDURE — 97110 THERAPEUTIC EXERCISES: CPT

## 2018-08-27 PROCEDURE — G8982 BODY POS GOAL STATUS: HCPCS

## 2018-09-06 ENCOUNTER — HOSPITAL ENCOUNTER (OUTPATIENT)
Dept: PHYSICAL THERAPY | Facility: HOSPITAL | Age: 68
Setting detail: THERAPIES SERIES
Discharge: HOME | End: 2018-09-06

## 2018-09-06 DIAGNOSIS — M25.552 LEFT HIP PAIN: Primary | ICD-10-CM

## 2018-09-06 PROCEDURE — 97140 MANUAL THERAPY 1/> REGIONS: CPT | Performed by: PHYSICAL THERAPIST

## 2018-09-06 PROCEDURE — 97110 THERAPEUTIC EXERCISES: CPT | Performed by: PHYSICAL THERAPIST

## 2018-09-10 ENCOUNTER — HOSPITAL ENCOUNTER (OUTPATIENT)
Dept: PHYSICAL THERAPY | Facility: HOSPITAL | Age: 68
Setting detail: THERAPIES SERIES
Discharge: HOME | End: 2018-09-10

## 2018-09-10 DIAGNOSIS — G89.29 CHRONIC MIDLINE LOW BACK PAIN WITH LEFT-SIDED SCIATICA: ICD-10-CM

## 2018-09-10 DIAGNOSIS — M54.42 CHRONIC MIDLINE LOW BACK PAIN WITH LEFT-SIDED SCIATICA: ICD-10-CM

## 2018-09-10 DIAGNOSIS — M25.552 LEFT HIP PAIN: Primary | ICD-10-CM

## 2018-09-10 PROCEDURE — 97110 THERAPEUTIC EXERCISES: CPT

## 2018-09-10 PROCEDURE — 97140 MANUAL THERAPY 1/> REGIONS: CPT

## 2018-09-13 ENCOUNTER — OFFICE VISIT (OUTPATIENT)
Dept: GASTROENTEROLOGY | Facility: CLINIC | Age: 68
End: 2018-09-13

## 2018-09-13 ENCOUNTER — APPOINTMENT (OUTPATIENT)
Dept: PHYSICAL THERAPY | Facility: HOSPITAL | Age: 68
End: 2018-09-13

## 2018-09-13 VITALS
HEIGHT: 61 IN | SYSTOLIC BLOOD PRESSURE: 140 MMHG | BODY MASS INDEX: 28.66 KG/M2 | DIASTOLIC BLOOD PRESSURE: 88 MMHG | WEIGHT: 151.8 LBS | TEMPERATURE: 97.9 F

## 2018-09-13 DIAGNOSIS — K27.9 PEPTIC ULCER DISEASE: ICD-10-CM

## 2018-09-13 DIAGNOSIS — A04.8 H. PYLORI INFECTION: Primary | ICD-10-CM

## 2018-09-13 PROCEDURE — 99203 OFFICE O/P NEW LOW 30 MIN: CPT | Performed by: INTERNAL MEDICINE

## 2018-09-13 RX ORDER — MULTIPLE VITAMINS W/ MINERALS TAB 9MG-400MCG
1 TAB ORAL DAILY
COMMUNITY
End: 2022-07-26

## 2018-09-17 ENCOUNTER — APPOINTMENT (OUTPATIENT)
Dept: PHYSICAL THERAPY | Facility: HOSPITAL | Age: 68
End: 2018-09-17

## 2018-09-20 ENCOUNTER — APPOINTMENT (OUTPATIENT)
Dept: PHYSICAL THERAPY | Facility: HOSPITAL | Age: 68
End: 2018-09-20

## 2018-09-24 ENCOUNTER — HOSPITAL ENCOUNTER (OUTPATIENT)
Dept: PHYSICAL THERAPY | Facility: HOSPITAL | Age: 68
Setting detail: THERAPIES SERIES
Discharge: HOME | End: 2018-09-24

## 2018-09-24 DIAGNOSIS — G89.29 CHRONIC MIDLINE LOW BACK PAIN WITH LEFT-SIDED SCIATICA: ICD-10-CM

## 2018-09-24 DIAGNOSIS — M54.42 CHRONIC MIDLINE LOW BACK PAIN WITH LEFT-SIDED SCIATICA: ICD-10-CM

## 2018-09-24 DIAGNOSIS — M25.552 LEFT HIP PAIN: Primary | ICD-10-CM

## 2018-09-24 PROCEDURE — 97012 MECHANICAL TRACTION THERAPY: CPT

## 2018-09-24 PROCEDURE — G8981 BODY POS CURRENT STATUS: HCPCS

## 2018-09-24 PROCEDURE — G8982 BODY POS GOAL STATUS: HCPCS

## 2018-09-24 PROCEDURE — 97110 THERAPEUTIC EXERCISES: CPT

## 2018-09-27 ENCOUNTER — HOSPITAL ENCOUNTER (OUTPATIENT)
Dept: PHYSICAL THERAPY | Facility: HOSPITAL | Age: 68
Setting detail: THERAPIES SERIES
Discharge: HOME | End: 2018-09-27

## 2018-09-27 DIAGNOSIS — M54.42 CHRONIC MIDLINE LOW BACK PAIN WITH LEFT-SIDED SCIATICA: ICD-10-CM

## 2018-09-27 DIAGNOSIS — M25.552 LEFT HIP PAIN: Primary | ICD-10-CM

## 2018-09-27 DIAGNOSIS — G89.29 CHRONIC MIDLINE LOW BACK PAIN WITH LEFT-SIDED SCIATICA: ICD-10-CM

## 2018-09-27 PROCEDURE — 97110 THERAPEUTIC EXERCISES: CPT

## 2018-09-27 PROCEDURE — 97012 MECHANICAL TRACTION THERAPY: CPT

## 2018-10-08 ENCOUNTER — APPOINTMENT (OUTPATIENT)
Dept: PHYSICAL THERAPY | Facility: HOSPITAL | Age: 68
End: 2018-10-08

## 2018-10-12 ENCOUNTER — APPOINTMENT (OUTPATIENT)
Dept: PHYSICAL THERAPY | Facility: HOSPITAL | Age: 68
End: 2018-10-12

## 2018-11-09 ENCOUNTER — TELEPHONE (OUTPATIENT)
Dept: GASTROENTEROLOGY | Facility: CLINIC | Age: 68
End: 2018-11-09

## 2018-11-09 DIAGNOSIS — A04.8 H. PYLORI INFECTION: Primary | ICD-10-CM

## 2018-11-13 ENCOUNTER — TELEPHONE (OUTPATIENT)
Dept: GASTROENTEROLOGY | Facility: CLINIC | Age: 68
End: 2018-11-13

## 2018-11-13 LAB — UREA BREATH TEST QL: NEGATIVE

## 2018-11-16 ENCOUNTER — DOCUMENTATION (OUTPATIENT)
Dept: PHYSICAL THERAPY | Facility: HOSPITAL | Age: 68
End: 2018-11-16

## 2018-12-21 ENCOUNTER — PROCEDURE VISIT (OUTPATIENT)
Dept: OBSTETRICS AND GYNECOLOGY | Age: 68
End: 2018-12-21

## 2018-12-21 ENCOUNTER — APPOINTMENT (OUTPATIENT)
Dept: WOMENS IMAGING | Facility: HOSPITAL | Age: 68
End: 2018-12-21

## 2018-12-21 ENCOUNTER — OFFICE VISIT (OUTPATIENT)
Dept: OBSTETRICS AND GYNECOLOGY | Age: 68
End: 2018-12-21

## 2018-12-21 VITALS
HEIGHT: 61 IN | WEIGHT: 151 LBS | DIASTOLIC BLOOD PRESSURE: 68 MMHG | SYSTOLIC BLOOD PRESSURE: 104 MMHG | BODY MASS INDEX: 28.51 KG/M2

## 2018-12-21 DIAGNOSIS — Z00.00 ANNUAL PHYSICAL EXAM: ICD-10-CM

## 2018-12-21 DIAGNOSIS — Z12.31 VISIT FOR SCREENING MAMMOGRAM: Primary | ICD-10-CM

## 2018-12-21 DIAGNOSIS — Z11.51 SCREENING FOR HPV (HUMAN PAPILLOMAVIRUS): ICD-10-CM

## 2018-12-21 DIAGNOSIS — K25.0 ACUTE GASTRIC ULCER WITH HEMORRHAGE: ICD-10-CM

## 2018-12-21 DIAGNOSIS — Z01.419 WELL WOMAN EXAM WITH ROUTINE GYNECOLOGICAL EXAM: Primary | ICD-10-CM

## 2018-12-21 PROBLEM — E78.2 MIXED HYPERLIPIDEMIA: Status: ACTIVE | Noted: 2018-12-21

## 2018-12-21 PROBLEM — C22.1 CHOLANGIOCARCINOMA OF BILIARY TRACT (HCC): Status: ACTIVE | Noted: 2018-12-21

## 2018-12-21 PROBLEM — K21.9 GASTROESOPHAGEAL REFLUX DISEASE WITHOUT ESOPHAGITIS: Status: ACTIVE | Noted: 2018-12-21

## 2018-12-21 PROBLEM — E03.9 HYPOTHYROIDISM: Status: ACTIVE | Noted: 2018-12-21

## 2018-12-21 PROCEDURE — 77067 SCR MAMMO BI INCL CAD: CPT | Performed by: RADIOLOGY

## 2018-12-21 PROCEDURE — G0101 CA SCREEN;PELVIC/BREAST EXAM: HCPCS | Performed by: OBSTETRICS & GYNECOLOGY

## 2018-12-21 PROCEDURE — 77067 SCR MAMMO BI INCL CAD: CPT | Performed by: OBSTETRICS & GYNECOLOGY

## 2018-12-21 RX ORDER — TRAMADOL HYDROCHLORIDE 50 MG/1
TABLET ORAL
Refills: 2 | COMMUNITY
Start: 2018-10-01 | End: 2019-04-02

## 2018-12-24 LAB
CYTOLOGIST CVX/VAG CYTO: NORMAL
CYTOLOGY CVX/VAG DOC THIN PREP: NORMAL
DX ICD CODE: NORMAL
HIV 1 & 2 AB SER-IMP: NORMAL
HPV I/H RISK 1 DNA CVX QL PROBE+SIG AMP: NEGATIVE
OTHER STN SPEC: NORMAL
PATH REPORT.FINAL DX SPEC: NORMAL
STAT OF ADQ CVX/VAG CYTO-IMP: NORMAL

## 2018-12-26 ENCOUNTER — TELEPHONE (OUTPATIENT)
Dept: OBSTETRICS AND GYNECOLOGY | Age: 68
End: 2018-12-26

## 2019-04-02 ENCOUNTER — APPOINTMENT (OUTPATIENT)
Dept: CT IMAGING | Facility: HOSPITAL | Age: 69
End: 2019-04-02

## 2019-04-02 ENCOUNTER — HOSPITAL ENCOUNTER (OUTPATIENT)
Facility: HOSPITAL | Age: 69
Discharge: HOME | End: 2019-04-04
Attending: EMERGENCY MEDICINE | Admitting: HOSPITALIST

## 2019-04-02 DIAGNOSIS — G45.9 TIA (TRANSIENT ISCHEMIC ATTACK): ICD-10-CM

## 2019-04-02 DIAGNOSIS — R20.0 NUMBNESS AND TINGLING OF LEFT ARM AND LEG: ICD-10-CM

## 2019-04-02 DIAGNOSIS — R20.2 NUMBNESS AND TINGLING OF LEFT ARM AND LEG: ICD-10-CM

## 2019-04-02 DIAGNOSIS — K25.0 ACUTE GASTRIC ULCER WITH HEMORRHAGE: Primary | ICD-10-CM

## 2019-04-02 LAB
ANION GAP SERPL CALCULATED.3IONS-SCNC: 14.5 MMOL/L
BASOPHILS # BLD AUTO: 0.03 10*3/MM3 (ref 0–0.2)
BASOPHILS NFR BLD AUTO: 0.5 % (ref 0–1.5)
BUN BLD-MCNC: 10 MG/DL (ref 8–23)
BUN/CREAT SERPL: 12.3 (ref 7–25)
CALCIUM SPEC-SCNC: 9.8 MG/DL (ref 8.6–10.5)
CHLORIDE SERPL-SCNC: 101 MMOL/L (ref 98–107)
CO2 SERPL-SCNC: 23.5 MMOL/L (ref 22–29)
CREAT BLD-MCNC: 0.81 MG/DL (ref 0.57–1)
DEPRECATED RDW RBC AUTO: 43.8 FL (ref 37–54)
EOSINOPHIL # BLD AUTO: 0.19 10*3/MM3 (ref 0–0.4)
EOSINOPHIL NFR BLD AUTO: 3.4 % (ref 0.3–6.2)
ERYTHROCYTE [DISTWIDTH] IN BLOOD BY AUTOMATED COUNT: 13 % (ref 12.3–15.4)
GFR SERPL CREATININE-BSD FRML MDRD: 70 ML/MIN/1.73
GFR SERPL CREATININE-BSD FRML MDRD: 85 ML/MIN/1.73
GLUCOSE BLD-MCNC: 107 MG/DL (ref 65–99)
GLUCOSE BLDC GLUCOMTR-MCNC: 98 MG/DL (ref 70–130)
HCT VFR BLD AUTO: 42 % (ref 34–46.6)
HGB BLD-MCNC: 13.8 G/DL (ref 12–15.9)
HOLD SPECIMEN: NORMAL
HOLD SPECIMEN: NORMAL
IMM GRANULOCYTES # BLD AUTO: 0.01 10*3/MM3 (ref 0–0.05)
IMM GRANULOCYTES NFR BLD AUTO: 0.2 % (ref 0–0.5)
LYMPHOCYTES # BLD AUTO: 1.94 10*3/MM3 (ref 0.7–3.1)
LYMPHOCYTES NFR BLD AUTO: 34.8 % (ref 19.6–45.3)
MCH RBC QN AUTO: 30 PG (ref 26.6–33)
MCHC RBC AUTO-ENTMCNC: 32.9 G/DL (ref 31.5–35.7)
MCV RBC AUTO: 91.3 FL (ref 79–97)
MONOCYTES # BLD AUTO: 0.89 10*3/MM3 (ref 0.1–0.9)
MONOCYTES NFR BLD AUTO: 15.9 % (ref 5–12)
NEUTROPHILS # BLD AUTO: 2.52 10*3/MM3 (ref 1.4–7)
NEUTROPHILS NFR BLD AUTO: 45.2 % (ref 42.7–76)
NRBC BLD AUTO-RTO: 0 /100 WBC (ref 0–0)
PLATELET # BLD AUTO: 238 10*3/MM3 (ref 140–450)
PMV BLD AUTO: 10.6 FL (ref 6–12)
POTASSIUM BLD-SCNC: 4.1 MMOL/L (ref 3.5–5.2)
RBC # BLD AUTO: 4.6 10*6/MM3 (ref 3.77–5.28)
SODIUM BLD-SCNC: 139 MMOL/L (ref 136–145)
TROPONIN T SERPL-MCNC: <0.01 NG/ML (ref 0–0.03)
WBC NRBC COR # BLD: 5.58 10*3/MM3 (ref 3.4–10.8)
WHOLE BLOOD HOLD SPECIMEN: NORMAL
WHOLE BLOOD HOLD SPECIMEN: NORMAL

## 2019-04-02 PROCEDURE — 0 IOPAMIDOL PER 1 ML: Performed by: EMERGENCY MEDICINE

## 2019-04-02 PROCEDURE — 82962 GLUCOSE BLOOD TEST: CPT

## 2019-04-02 PROCEDURE — 96360 HYDRATION IV INFUSION INIT: CPT

## 2019-04-02 PROCEDURE — 0042T HC CT CEREBRAL PERFUSION W/WO CONTRAST: CPT

## 2019-04-02 PROCEDURE — G0378 HOSPITAL OBSERVATION PER HR: HCPCS

## 2019-04-02 PROCEDURE — 96361 HYDRATE IV INFUSION ADD-ON: CPT

## 2019-04-02 PROCEDURE — 93010 ELECTROCARDIOGRAM REPORT: CPT | Performed by: INTERNAL MEDICINE

## 2019-04-02 PROCEDURE — 93005 ELECTROCARDIOGRAM TRACING: CPT | Performed by: EMERGENCY MEDICINE

## 2019-04-02 PROCEDURE — 82565 ASSAY OF CREATININE: CPT

## 2019-04-02 PROCEDURE — 85025 COMPLETE CBC W/AUTO DIFF WBC: CPT | Performed by: EMERGENCY MEDICINE

## 2019-04-02 PROCEDURE — 99285 EMERGENCY DEPT VISIT HI MDM: CPT

## 2019-04-02 PROCEDURE — 80048 BASIC METABOLIC PNL TOTAL CA: CPT | Performed by: EMERGENCY MEDICINE

## 2019-04-02 PROCEDURE — 99205 OFFICE O/P NEW HI 60 MIN: CPT | Performed by: PSYCHIATRY & NEUROLOGY

## 2019-04-02 PROCEDURE — 84484 ASSAY OF TROPONIN QUANT: CPT | Performed by: EMERGENCY MEDICINE

## 2019-04-02 PROCEDURE — 70498 CT ANGIOGRAPHY NECK: CPT

## 2019-04-02 PROCEDURE — 82607 VITAMIN B-12: CPT | Performed by: NURSE PRACTITIONER

## 2019-04-02 PROCEDURE — 70496 CT ANGIOGRAPHY HEAD: CPT

## 2019-04-02 RX ORDER — ASPIRIN 81 MG/1
81 TABLET, CHEWABLE ORAL DAILY
Status: DISCONTINUED | OUTPATIENT
Start: 2019-04-03 | End: 2019-04-04 | Stop reason: HOSPADM

## 2019-04-02 RX ORDER — ASPIRIN 300 MG/1
300 SUPPOSITORY RECTAL DAILY
Status: DISCONTINUED | OUTPATIENT
Start: 2019-04-03 | End: 2019-04-04 | Stop reason: HOSPADM

## 2019-04-02 RX ORDER — PANTOPRAZOLE SODIUM 40 MG/1
40 TABLET, DELAYED RELEASE ORAL EVERY MORNING
Status: DISCONTINUED | OUTPATIENT
Start: 2019-04-03 | End: 2019-04-04 | Stop reason: HOSPADM

## 2019-04-02 RX ORDER — GABAPENTIN 300 MG/1
600 CAPSULE ORAL NIGHTLY
Status: DISCONTINUED | OUTPATIENT
Start: 2019-04-02 | End: 2019-04-04 | Stop reason: HOSPADM

## 2019-04-02 RX ORDER — SODIUM CHLORIDE 0.9 % (FLUSH) 0.9 %
3 SYRINGE (ML) INJECTION EVERY 12 HOURS SCHEDULED
Status: DISCONTINUED | OUTPATIENT
Start: 2019-04-02 | End: 2019-04-04 | Stop reason: HOSPADM

## 2019-04-02 RX ORDER — SODIUM CHLORIDE 0.9 % (FLUSH) 0.9 %
10 SYRINGE (ML) INJECTION AS NEEDED
Status: DISCONTINUED | OUTPATIENT
Start: 2019-04-02 | End: 2019-04-04 | Stop reason: HOSPADM

## 2019-04-02 RX ORDER — PRAVASTATIN SODIUM 40 MG
40 TABLET ORAL NIGHTLY
Status: DISCONTINUED | OUTPATIENT
Start: 2019-04-02 | End: 2019-04-02 | Stop reason: SDUPTHER

## 2019-04-02 RX ORDER — LEVOTHYROXINE SODIUM 88 UG/1
88 TABLET ORAL DAILY
COMMUNITY
End: 2021-05-21 | Stop reason: DRUGHIGH

## 2019-04-02 RX ORDER — ASPIRIN 81 MG/1
81 TABLET, CHEWABLE ORAL DAILY
Status: DISCONTINUED | OUTPATIENT
Start: 2019-04-02 | End: 2019-04-02 | Stop reason: SDUPTHER

## 2019-04-02 RX ORDER — TRAMADOL HYDROCHLORIDE 50 MG/1
50 TABLET ORAL EVERY 6 HOURS PRN
COMMUNITY

## 2019-04-02 RX ORDER — LEVOTHYROXINE SODIUM 88 UG/1
88 TABLET ORAL DAILY
Status: DISCONTINUED | OUTPATIENT
Start: 2019-04-03 | End: 2019-04-04 | Stop reason: HOSPADM

## 2019-04-02 RX ORDER — TRAMADOL HYDROCHLORIDE 50 MG/1
50 TABLET ORAL EVERY 6 HOURS PRN
Status: DISCONTINUED | OUTPATIENT
Start: 2019-04-02 | End: 2019-04-04 | Stop reason: HOSPADM

## 2019-04-02 RX ORDER — ATORVASTATIN CALCIUM 80 MG/1
80 TABLET, FILM COATED ORAL NIGHTLY
Status: DISCONTINUED | OUTPATIENT
Start: 2019-04-02 | End: 2019-04-04 | Stop reason: HOSPADM

## 2019-04-02 RX ORDER — SODIUM CHLORIDE 0.9 % (FLUSH) 0.9 %
3-10 SYRINGE (ML) INJECTION AS NEEDED
Status: DISCONTINUED | OUTPATIENT
Start: 2019-04-02 | End: 2019-04-04 | Stop reason: HOSPADM

## 2019-04-02 RX ORDER — METOPROLOL SUCCINATE 50 MG/1
50 TABLET, EXTENDED RELEASE ORAL DAILY
Status: DISCONTINUED | OUTPATIENT
Start: 2019-04-03 | End: 2019-04-04 | Stop reason: HOSPADM

## 2019-04-02 RX ORDER — SODIUM CHLORIDE 9 MG/ML
75 INJECTION, SOLUTION INTRAVENOUS CONTINUOUS
Status: DISCONTINUED | OUTPATIENT
Start: 2019-04-02 | End: 2019-04-03

## 2019-04-02 RX ORDER — FERROUS SULFATE 325(65) MG
325 TABLET ORAL
Status: DISCONTINUED | OUTPATIENT
Start: 2019-04-03 | End: 2019-04-04 | Stop reason: HOSPADM

## 2019-04-02 RX ORDER — MULTIPLE VITAMINS W/ MINERALS TAB 9MG-400MCG
1 TAB ORAL DAILY
Status: DISCONTINUED | OUTPATIENT
Start: 2019-04-03 | End: 2019-04-04 | Stop reason: HOSPADM

## 2019-04-02 RX ADMIN — GABAPENTIN 600 MG: 300 CAPSULE ORAL at 22:09

## 2019-04-02 RX ADMIN — ASPIRIN 81 MG: 81 TABLET, CHEWABLE ORAL at 17:16

## 2019-04-02 RX ADMIN — IOPAMIDOL 150 ML: 755 INJECTION, SOLUTION INTRAVENOUS at 15:49

## 2019-04-02 RX ADMIN — SODIUM CHLORIDE 75 ML/HR: 9 INJECTION, SOLUTION INTRAVENOUS at 22:09

## 2019-04-02 RX ADMIN — Medication 3 ML: at 20:24

## 2019-04-02 RX ADMIN — ATORVASTATIN CALCIUM 80 MG: 80 TABLET, FILM COATED ORAL at 20:24

## 2019-04-03 ENCOUNTER — APPOINTMENT (OUTPATIENT)
Dept: CARDIOLOGY | Facility: HOSPITAL | Age: 69
End: 2019-04-03

## 2019-04-03 ENCOUNTER — APPOINTMENT (OUTPATIENT)
Dept: MRI IMAGING | Facility: HOSPITAL | Age: 69
End: 2019-04-03

## 2019-04-03 LAB
AORTIC DIMENSIONLESS INDEX: 1 (DI)
BH CV ECHO MEAS - ACS: 1.6 CM
BH CV ECHO MEAS - AO MAX PG: 4 MMHG
BH CV ECHO MEAS - AO MEAN PG (FULL): 0 MMHG
BH CV ECHO MEAS - AO MEAN PG: 2 MMHG
BH CV ECHO MEAS - AO ROOT AREA (BSA CORRECTED): 1.9
BH CV ECHO MEAS - AO ROOT AREA: 7.5 CM^2
BH CV ECHO MEAS - AO ROOT DIAM: 3.1 CM
BH CV ECHO MEAS - AO V2 MAX: 97 CM/SEC
BH CV ECHO MEAS - AO V2 MEAN: 72.2 CM/SEC
BH CV ECHO MEAS - AO V2 VTI: 24.1 CM
BH CV ECHO MEAS - ASC AORTA: 3.6 CM
BH CV ECHO MEAS - AVA(I,A): 3.2 CM^2
BH CV ECHO MEAS - AVA(I,D): 3.2 CM^2
BH CV ECHO MEAS - BSA(HAYCOCK): 1.7 M^2
BH CV ECHO MEAS - BSA: 1.7 M^2
BH CV ECHO MEAS - BZI_BMI: 26.7 KILOGRAMS/M^2
BH CV ECHO MEAS - BZI_METRIC_HEIGHT: 157.5 CM
BH CV ECHO MEAS - BZI_METRIC_WEIGHT: 66.2 KG
BH CV ECHO MEAS - EDV(CUBED): 91.1 ML
BH CV ECHO MEAS - EDV(MOD-SP2): 54 ML
BH CV ECHO MEAS - EDV(MOD-SP4): 56 ML
BH CV ECHO MEAS - EDV(TEICH): 92.4 ML
BH CV ECHO MEAS - EF(CUBED): 92.5 %
BH CV ECHO MEAS - EF(MOD-BP): 67 %
BH CV ECHO MEAS - EF(MOD-SP2): 70.4 %
BH CV ECHO MEAS - EF(MOD-SP4): 66.1 %
BH CV ECHO MEAS - EF(TEICH): 87.9 %
BH CV ECHO MEAS - ESV(CUBED): 6.9 ML
BH CV ECHO MEAS - ESV(MOD-SP2): 16 ML
BH CV ECHO MEAS - ESV(MOD-SP4): 19 ML
BH CV ECHO MEAS - ESV(TEICH): 11.2 ML
BH CV ECHO MEAS - FS: 57.8 %
BH CV ECHO MEAS - IVS/LVPW: 1
BH CV ECHO MEAS - IVSD: 1 CM
BH CV ECHO MEAS - LAT PEAK E' VEL: 7 CM/SEC
BH CV ECHO MEAS - LV DIASTOLIC VOL/BSA (35-75): 33.5 ML/M^2
BH CV ECHO MEAS - LV MASS(C)D: 153.3 GRAMS
BH CV ECHO MEAS - LV MASS(C)DI: 91.7 GRAMS/M^2
BH CV ECHO MEAS - LV MEAN PG: 2 MMHG
BH CV ECHO MEAS - LV SYSTOLIC VOL/BSA (12-30): 11.4 ML/M^2
BH CV ECHO MEAS - LV V1 MAX: 97 CM/SEC
BH CV ECHO MEAS - LV V1 MEAN: 69.3 CM/SEC
BH CV ECHO MEAS - LV V1 VTI: 24.6 CM
BH CV ECHO MEAS - LVIDD: 4.5 CM
BH CV ECHO MEAS - LVIDS: 1.9 CM
BH CV ECHO MEAS - LVLD AP2: 7.6 CM
BH CV ECHO MEAS - LVLD AP4: 7.6 CM
BH CV ECHO MEAS - LVLS AP2: 6 CM
BH CV ECHO MEAS - LVLS AP4: 6.1 CM
BH CV ECHO MEAS - LVOT AREA (M): 3.1 CM^2
BH CV ECHO MEAS - LVOT AREA: 3.1 CM^2
BH CV ECHO MEAS - LVOT DIAM: 2 CM
BH CV ECHO MEAS - LVPWD: 1 CM
BH CV ECHO MEAS - MED PEAK E' VEL: 6 CM/SEC
BH CV ECHO MEAS - MV A DUR: 0.14 SEC
BH CV ECHO MEAS - MV A MAX VEL: 87.4 CM/SEC
BH CV ECHO MEAS - MV DEC SLOPE: 443 CM/SEC^2
BH CV ECHO MEAS - MV DEC TIME: 0.18 SEC
BH CV ECHO MEAS - MV E MAX VEL: 75.5 CM/SEC
BH CV ECHO MEAS - MV E/A: 0.86
BH CV ECHO MEAS - MV MEAN PG: 1 MMHG
BH CV ECHO MEAS - MV P1/2T MAX VEL: 78.2 CM/SEC
BH CV ECHO MEAS - MV P1/2T: 51.7 MSEC
BH CV ECHO MEAS - MV V2 MEAN: 50.5 CM/SEC
BH CV ECHO MEAS - MV V2 VTI: 21.6 CM
BH CV ECHO MEAS - MVA P1/2T LCG: 2.8 CM^2
BH CV ECHO MEAS - MVA(P1/2T): 4.3 CM^2
BH CV ECHO MEAS - MVA(VTI): 3.6 CM^2
BH CV ECHO MEAS - PA ACC SLOPE: 0 CM/SEC^2
BH CV ECHO MEAS - PA ACC TIME: 0.12 SEC
BH CV ECHO MEAS - PA MAX PG (FULL): 1.5 MMHG
BH CV ECHO MEAS - PA MAX PG: 2.5 MMHG
BH CV ECHO MEAS - PA PR(ACCEL): 23.7 MMHG
BH CV ECHO MEAS - PA V2 MAX: 78.3 CM/SEC
BH CV ECHO MEAS - PVA(V,A): 2.3 CM^2
BH CV ECHO MEAS - PVA(V,D): 2.3 CM^2
BH CV ECHO MEAS - QP/QS: 0.59
BH CV ECHO MEAS - RAP SYSTOLE: 3 MMHG
BH CV ECHO MEAS - RV MAX PG: 0.93 MMHG
BH CV ECHO MEAS - RV MEAN PG: 1 MMHG
BH CV ECHO MEAS - RV V1 MAX: 48.1 CM/SEC
BH CV ECHO MEAS - RV V1 MEAN: 37.2 CM/SEC
BH CV ECHO MEAS - RV V1 VTI: 11.9 CM
BH CV ECHO MEAS - RVOT AREA: 3.8 CM^2
BH CV ECHO MEAS - RVOT DIAM: 2.2 CM
BH CV ECHO MEAS - RVSP: 17 MMHG
BH CV ECHO MEAS - SI(AO): 108.8 ML/M^2
BH CV ECHO MEAS - SI(CUBED): 50.4 ML/M^2
BH CV ECHO MEAS - SI(LVOT): 46.2 ML/M^2
BH CV ECHO MEAS - SI(MOD-SP2): 22.7 ML/M^2
BH CV ECHO MEAS - SI(MOD-SP4): 22.1 ML/M^2
BH CV ECHO MEAS - SI(TEICH): 48.6 ML/M^2
BH CV ECHO MEAS - SV(AO): 181.9 ML
BH CV ECHO MEAS - SV(CUBED): 84.3 ML
BH CV ECHO MEAS - SV(LVOT): 77.3 ML
BH CV ECHO MEAS - SV(MOD-SP2): 38 ML
BH CV ECHO MEAS - SV(MOD-SP4): 37 ML
BH CV ECHO MEAS - SV(RVOT): 45.2 ML
BH CV ECHO MEAS - SV(TEICH): 81.3 ML
BH CV ECHO MEAS - TAPSE (>1.6): 2.3 CM2
BH CV ECHO MEAS - TR MAX PG: 14
BH CV ECHO MEAS - TR MAX VEL: 190 CM/SEC
BH CV ECHO MEASUREMENTS AVERAGE E/E' RATIO: 11.62
BH CV VAS BP RIGHT ARM: NORMAL MMHG
BH CV XLRA - RV BASE: 3.1 CM
BH CV XLRA - TDI S': 10 CM/SEC
CHOLEST SERPL-MCNC: 156 MG/DL (ref 0–200)
CREAT BLDA-MCNC: 0.7 MG/DL (ref 0.6–1.3)
HBA1C MFR BLD: 5.8 % (ref 4.8–5.6)
HDLC SERPL-MCNC: 51 MG/DL (ref 40–60)
LDLC SERPL CALC-MCNC: 75 MG/DL (ref 0–100)
LDLC/HDLC SERPL: 1.46 {RATIO}
LEFT ATRIUM VOLUME INDEX: 13 ML/M2
MAXIMAL PREDICTED HEART RATE: 151 BPM
STRESS TARGET HR: 128 BPM
TRIGL SERPL-MCNC: 152 MG/DL (ref 0–150)
TSH SERPL DL<=0.05 MIU/L-ACNC: 0.14 MIU/ML (ref 0.27–4.2)
VIT B12 BLD-MCNC: 762 PG/ML (ref 211–946)
VLDLC SERPL-MCNC: 30.4 MG/DL (ref 5–40)

## 2019-04-03 PROCEDURE — G0378 HOSPITAL OBSERVATION PER HR: HCPCS

## 2019-04-03 PROCEDURE — 99214 OFFICE O/P EST MOD 30 MIN: CPT | Performed by: NURSE PRACTITIONER

## 2019-04-03 PROCEDURE — 70553 MRI BRAIN STEM W/O & W/DYE: CPT

## 2019-04-03 PROCEDURE — 36415 COLL VENOUS BLD VENIPUNCTURE: CPT | Performed by: PSYCHIATRY & NEUROLOGY

## 2019-04-03 PROCEDURE — A9577 INJ MULTIHANCE: HCPCS | Performed by: HOSPITALIST

## 2019-04-03 PROCEDURE — 0 GADOBENATE DIMEGLUMINE 529 MG/ML SOLUTION: Performed by: HOSPITALIST

## 2019-04-03 PROCEDURE — 93306 TTE W/DOPPLER COMPLETE: CPT

## 2019-04-03 PROCEDURE — 93306 TTE W/DOPPLER COMPLETE: CPT | Performed by: INTERNAL MEDICINE

## 2019-04-03 PROCEDURE — 80061 LIPID PANEL: CPT | Performed by: PSYCHIATRY & NEUROLOGY

## 2019-04-03 PROCEDURE — 83036 HEMOGLOBIN GLYCOSYLATED A1C: CPT | Performed by: PSYCHIATRY & NEUROLOGY

## 2019-04-03 PROCEDURE — 84439 ASSAY OF FREE THYROXINE: CPT | Performed by: NURSE PRACTITIONER

## 2019-04-03 PROCEDURE — 84443 ASSAY THYROID STIM HORMONE: CPT | Performed by: NURSE PRACTITIONER

## 2019-04-03 PROCEDURE — 99219 PR INITIAL OBSERVATION CARE/DAY 50 MINUTES: CPT | Performed by: INTERNAL MEDICINE

## 2019-04-03 PROCEDURE — 96361 HYDRATE IV INFUSION ADD-ON: CPT

## 2019-04-03 RX ORDER — ACETAMINOPHEN 325 MG/1
650 TABLET ORAL EVERY 6 HOURS PRN
Status: DISCONTINUED | OUTPATIENT
Start: 2019-04-03 | End: 2019-04-04 | Stop reason: HOSPADM

## 2019-04-03 RX ADMIN — METOPROLOL SUCCINATE 50 MG: 50 TABLET, FILM COATED, EXTENDED RELEASE ORAL at 09:55

## 2019-04-03 RX ADMIN — ATORVASTATIN CALCIUM 80 MG: 80 TABLET, FILM COATED ORAL at 20:17

## 2019-04-03 RX ADMIN — ASPIRIN 81 MG: 81 TABLET, CHEWABLE ORAL at 09:55

## 2019-04-03 RX ADMIN — FERROUS SULFATE TAB 325 MG (65 MG ELEMENTAL FE) 325 MG: 325 (65 FE) TAB at 09:55

## 2019-04-03 RX ADMIN — MULTIPLE VITAMINS W/ MINERALS TAB 1 TABLET: TAB at 09:55

## 2019-04-03 RX ADMIN — Medication 3 ML: at 09:57

## 2019-04-03 RX ADMIN — Medication 3 ML: at 20:18

## 2019-04-03 RX ADMIN — GABAPENTIN 600 MG: 300 CAPSULE ORAL at 20:17

## 2019-04-03 RX ADMIN — GADOBENATE DIMEGLUMINE 13 ML: 529 INJECTION, SOLUTION INTRAVENOUS at 07:37

## 2019-04-03 RX ADMIN — LEVOTHYROXINE SODIUM 88 MCG: 88 TABLET ORAL at 09:55

## 2019-04-03 RX ADMIN — PANTOPRAZOLE SODIUM 40 MG: 40 TABLET, DELAYED RELEASE ORAL at 06:52

## 2019-04-04 ENCOUNTER — APPOINTMENT (OUTPATIENT)
Dept: CARDIOLOGY | Facility: HOSPITAL | Age: 69
End: 2019-04-04

## 2019-04-04 ENCOUNTER — ANESTHESIA EVENT (OUTPATIENT)
Dept: GASTROENTEROLOGY | Facility: HOSPITAL | Age: 69
End: 2019-04-04

## 2019-04-04 ENCOUNTER — ANESTHESIA (OUTPATIENT)
Dept: GASTROENTEROLOGY | Facility: HOSPITAL | Age: 69
End: 2019-04-04

## 2019-04-04 VITALS
HEIGHT: 62 IN | WEIGHT: 146 LBS | DIASTOLIC BLOOD PRESSURE: 90 MMHG | OXYGEN SATURATION: 98 % | BODY MASS INDEX: 26.87 KG/M2 | HEART RATE: 73 BPM | SYSTOLIC BLOOD PRESSURE: 124 MMHG | RESPIRATION RATE: 17 BRPM | TEMPERATURE: 97.8 F

## 2019-04-04 LAB — T4 FREE SERPL-MCNC: 1.57 NG/DL (ref 0.93–1.7)

## 2019-04-04 PROCEDURE — A9270 NON-COVERED ITEM OR SERVICE: HCPCS | Performed by: INTERNAL MEDICINE

## 2019-04-04 PROCEDURE — G0378 HOSPITAL OBSERVATION PER HR: HCPCS

## 2019-04-04 PROCEDURE — 88305 TISSUE EXAM BY PATHOLOGIST: CPT | Performed by: INTERNAL MEDICINE

## 2019-04-04 PROCEDURE — 63710000001 CLOPIDOGREL 75 MG TABLET: Performed by: NURSE PRACTITIONER

## 2019-04-04 PROCEDURE — 99213 OFFICE O/P EST LOW 20 MIN: CPT | Performed by: NURSE PRACTITIONER

## 2019-04-04 PROCEDURE — 43239 EGD BIOPSY SINGLE/MULTIPLE: CPT | Performed by: INTERNAL MEDICINE

## 2019-04-04 PROCEDURE — A9270 NON-COVERED ITEM OR SERVICE: HCPCS | Performed by: NURSE PRACTITIONER

## 2019-04-04 PROCEDURE — 63710000001 FERROUS SULFATE 325 (65 FE) MG TABLET: Performed by: INTERNAL MEDICINE

## 2019-04-04 PROCEDURE — 0296T HC EXT ECG > 48HR TO 21 DAY RCRD W/CONECT INTL RCRD: CPT

## 2019-04-04 PROCEDURE — 63710000001 ASPIRIN 81 MG CHEWABLE TABLET: Performed by: PSYCHIATRY & NEUROLOGY

## 2019-04-04 PROCEDURE — A9270 NON-COVERED ITEM OR SERVICE: HCPCS | Performed by: PSYCHIATRY & NEUROLOGY

## 2019-04-04 PROCEDURE — 25010000002 PROPOFOL 10 MG/ML EMULSION: Performed by: NURSE ANESTHETIST, CERTIFIED REGISTERED

## 2019-04-04 PROCEDURE — 63710000001 MULTIVITAMIN-MINERALS TABLET: Performed by: INTERNAL MEDICINE

## 2019-04-04 PROCEDURE — 63710000001 LEVOTHYROXINE 88 MCG TABLET: Performed by: INTERNAL MEDICINE

## 2019-04-04 RX ORDER — ASPIRIN 81 MG/1
81 TABLET, CHEWABLE ORAL DAILY
Start: 2019-04-05

## 2019-04-04 RX ORDER — SODIUM CHLORIDE, SODIUM LACTATE, POTASSIUM CHLORIDE, CALCIUM CHLORIDE 600; 310; 30; 20 MG/100ML; MG/100ML; MG/100ML; MG/100ML
30 INJECTION, SOLUTION INTRAVENOUS CONTINUOUS
Status: DISCONTINUED | OUTPATIENT
Start: 2019-04-04 | End: 2019-04-04 | Stop reason: HOSPADM

## 2019-04-04 RX ORDER — PROPOFOL 10 MG/ML
VIAL (ML) INTRAVENOUS CONTINUOUS PRN
Status: DISCONTINUED | OUTPATIENT
Start: 2019-04-04 | End: 2019-04-04 | Stop reason: SURG

## 2019-04-04 RX ORDER — PANTOPRAZOLE SODIUM 40 MG/1
40 TABLET, DELAYED RELEASE ORAL EVERY MORNING
Qty: 30 TABLET | Refills: 0 | Status: SHIPPED | OUTPATIENT
Start: 2019-04-05 | End: 2021-05-21 | Stop reason: DRUGHIGH

## 2019-04-04 RX ORDER — SODIUM CHLORIDE, SODIUM LACTATE, POTASSIUM CHLORIDE, CALCIUM CHLORIDE 600; 310; 30; 20 MG/100ML; MG/100ML; MG/100ML; MG/100ML
INJECTION, SOLUTION INTRAVENOUS CONTINUOUS PRN
Status: DISCONTINUED | OUTPATIENT
Start: 2019-04-04 | End: 2019-04-04 | Stop reason: SURG

## 2019-04-04 RX ORDER — ATORVASTATIN CALCIUM 80 MG/1
80 TABLET, FILM COATED ORAL NIGHTLY
Qty: 30 TABLET | Refills: 0 | Status: SHIPPED | OUTPATIENT
Start: 2019-04-04 | End: 2021-05-21 | Stop reason: DRUGHIGH

## 2019-04-04 RX ORDER — CLOPIDOGREL BISULFATE 75 MG/1
75 TABLET ORAL DAILY
Status: DISCONTINUED | OUTPATIENT
Start: 2019-04-04 | End: 2019-04-04 | Stop reason: HOSPADM

## 2019-04-04 RX ORDER — CLOPIDOGREL BISULFATE 75 MG/1
75 TABLET ORAL DAILY
Qty: 30 TABLET | Refills: 0 | Status: SHIPPED | OUTPATIENT
Start: 2019-04-04 | End: 2019-05-04

## 2019-04-04 RX ORDER — PROPOFOL 10 MG/ML
VIAL (ML) INTRAVENOUS AS NEEDED
Status: DISCONTINUED | OUTPATIENT
Start: 2019-04-04 | End: 2019-04-04 | Stop reason: SURG

## 2019-04-04 RX ORDER — LIDOCAINE HYDROCHLORIDE 20 MG/ML
INJECTION, SOLUTION INFILTRATION; PERINEURAL AS NEEDED
Status: DISCONTINUED | OUTPATIENT
Start: 2019-04-04 | End: 2019-04-04 | Stop reason: SURG

## 2019-04-04 RX ADMIN — ASPIRIN 81 MG: 81 TABLET, CHEWABLE ORAL at 12:29

## 2019-04-04 RX ADMIN — LEVOTHYROXINE SODIUM 88 MCG: 88 TABLET ORAL at 12:29

## 2019-04-04 RX ADMIN — Medication 3 ML: at 12:00

## 2019-04-04 RX ADMIN — SODIUM CHLORIDE, POTASSIUM CHLORIDE, SODIUM LACTATE AND CALCIUM CHLORIDE 30 ML/HR: 600; 310; 30; 20 INJECTION, SOLUTION INTRAVENOUS at 09:15

## 2019-04-04 RX ADMIN — LIDOCAINE HYDROCHLORIDE 60 MG: 20 INJECTION, SOLUTION INFILTRATION; PERINEURAL at 09:40

## 2019-04-04 RX ADMIN — PROPOFOL 50 MG: 10 INJECTION, EMULSION INTRAVENOUS at 10:00

## 2019-04-04 RX ADMIN — METOPROLOL SUCCINATE 50 MG: 50 TABLET, FILM COATED, EXTENDED RELEASE ORAL at 07:55

## 2019-04-04 RX ADMIN — FERROUS SULFATE TAB 325 MG (65 MG ELEMENTAL FE) 325 MG: 325 (65 FE) TAB at 12:29

## 2019-04-04 RX ADMIN — CLOPIDOGREL 75 MG: 75 TABLET, FILM COATED ORAL at 13:19

## 2019-04-04 RX ADMIN — SODIUM CHLORIDE, POTASSIUM CHLORIDE, SODIUM LACTATE AND CALCIUM CHLORIDE: 600; 310; 30; 20 INJECTION, SOLUTION INTRAVENOUS at 09:25

## 2019-04-04 RX ADMIN — PROPOFOL 50 MG: 10 INJECTION, EMULSION INTRAVENOUS at 09:40

## 2019-04-04 RX ADMIN — PROPOFOL 100 MCG/KG/MIN: 10 INJECTION, EMULSION INTRAVENOUS at 09:40

## 2019-04-04 RX ADMIN — MULTIPLE VITAMINS W/ MINERALS TAB 1 TABLET: TAB at 12:29

## 2019-04-04 RX ADMIN — TRAMADOL HYDROCHLORIDE 50 MG: 50 TABLET ORAL at 06:38

## 2019-04-05 ENCOUNTER — READMISSION MANAGEMENT (OUTPATIENT)
Dept: CALL CENTER | Facility: HOSPITAL | Age: 69
End: 2019-04-05

## 2019-04-05 LAB
CYTO UR: NORMAL
LAB AP CASE REPORT: NORMAL
PATH REPORT.FINAL DX SPEC: NORMAL
PATH REPORT.GROSS SPEC: NORMAL

## 2019-04-08 ENCOUNTER — TELEPHONE (OUTPATIENT)
Dept: GASTROENTEROLOGY | Facility: CLINIC | Age: 69
End: 2019-04-08

## 2019-04-08 ENCOUNTER — READMISSION MANAGEMENT (OUTPATIENT)
Dept: CALL CENTER | Facility: HOSPITAL | Age: 69
End: 2019-04-08

## 2019-04-09 ENCOUNTER — READMISSION MANAGEMENT (OUTPATIENT)
Dept: CALL CENTER | Facility: HOSPITAL | Age: 69
End: 2019-04-09

## 2019-04-18 ENCOUNTER — TELEPHONE (OUTPATIENT)
Dept: NEUROLOGY | Facility: CLINIC | Age: 69
End: 2019-04-18

## 2019-04-25 PROCEDURE — 0298T HOLTER MONITOR - 72 HOUR UP TO 21 DAY: CPT | Performed by: INTERNAL MEDICINE

## 2019-05-06 ENCOUNTER — TELEPHONE (OUTPATIENT)
Dept: NEUROLOGY | Facility: CLINIC | Age: 69
End: 2019-05-06

## 2019-07-15 ENCOUNTER — OFFICE VISIT (OUTPATIENT)
Dept: NEUROLOGY | Facility: CLINIC | Age: 69
End: 2019-07-15

## 2019-07-15 VITALS
WEIGHT: 151 LBS | OXYGEN SATURATION: 98 % | BODY MASS INDEX: 27.79 KG/M2 | SYSTOLIC BLOOD PRESSURE: 134 MMHG | HEART RATE: 63 BPM | DIASTOLIC BLOOD PRESSURE: 80 MMHG | HEIGHT: 62 IN

## 2019-07-15 DIAGNOSIS — I10 HYPERTENSION, UNSPECIFIED TYPE: ICD-10-CM

## 2019-07-15 DIAGNOSIS — E78.2 MIXED HYPERLIPIDEMIA: ICD-10-CM

## 2019-07-15 DIAGNOSIS — G45.9 TIA (TRANSIENT ISCHEMIC ATTACK): Primary | ICD-10-CM

## 2019-07-15 PROCEDURE — 99213 OFFICE O/P EST LOW 20 MIN: CPT | Performed by: NURSE PRACTITIONER

## 2019-07-15 RX ORDER — PRAVASTATIN SODIUM 40 MG
40 TABLET ORAL DAILY
COMMUNITY

## 2019-07-15 RX ORDER — LEVOTHYROXINE SODIUM 0.07 MG/1
1 TABLET ORAL DAILY
COMMUNITY
Start: 2019-06-05 | End: 2022-07-26

## 2019-07-15 RX ORDER — OMEPRAZOLE 40 MG/1
1 CAPSULE, DELAYED RELEASE ORAL DAILY
COMMUNITY
Start: 2019-05-09

## 2019-12-23 ENCOUNTER — TRANSCRIBE ORDERS (OUTPATIENT)
Dept: ADMINISTRATIVE | Facility: HOSPITAL | Age: 69
End: 2019-12-23

## 2019-12-23 DIAGNOSIS — Z12.31 VISIT FOR SCREENING MAMMOGRAM: Primary | ICD-10-CM

## 2019-12-31 ENCOUNTER — TRANSCRIBE ORDERS (OUTPATIENT)
Dept: ADMINISTRATIVE | Facility: HOSPITAL | Age: 69
End: 2019-12-31

## 2019-12-31 DIAGNOSIS — C23 CARCINOMA GALLBLADDER (HCC): Primary | ICD-10-CM

## 2020-01-07 ENCOUNTER — TRANSCRIBE ORDERS (OUTPATIENT)
Dept: ADMINISTRATIVE | Facility: HOSPITAL | Age: 70
End: 2020-01-07

## 2020-01-07 DIAGNOSIS — N95.1 MENOPAUSAL SYMPTOMS: Primary | ICD-10-CM

## 2020-01-15 ENCOUNTER — HOSPITAL ENCOUNTER (OUTPATIENT)
Dept: CT IMAGING | Facility: HOSPITAL | Age: 70
Discharge: HOME | End: 2020-01-15
Admitting: FAMILY MEDICINE

## 2020-01-15 DIAGNOSIS — C23 CARCINOMA GALLBLADDER (HCC): ICD-10-CM

## 2020-01-15 LAB — CREAT BLDA-MCNC: 0.9 MG/DL (ref 0.6–1.3)

## 2020-01-15 PROCEDURE — 0 DIATRIZOATE MEGLUMINE & SODIUM PER 1 ML: Performed by: FAMILY MEDICINE

## 2020-01-15 PROCEDURE — 74177 CT ABD & PELVIS W/CONTRAST: CPT

## 2020-01-15 PROCEDURE — 82565 ASSAY OF CREATININE: CPT

## 2020-01-15 PROCEDURE — 71260 CT THORAX DX C+: CPT

## 2020-01-15 PROCEDURE — 25010000002 IOPAMIDOL 61 % SOLUTION: Performed by: FAMILY MEDICINE

## 2020-01-15 RX ADMIN — IOPAMIDOL 85 ML: 612 INJECTION, SOLUTION INTRAVENOUS at 16:10

## 2020-01-15 RX ADMIN — DIATRIZOATE MEGLUMINE AND DIATRIZOATE SODIUM 30 ML: 600; 100 SOLUTION ORAL; RECTAL at 15:00

## 2021-02-05 ENCOUNTER — IMMUNIZATION (OUTPATIENT)
Dept: VACCINE CLINIC | Facility: HOSPITAL | Age: 71
End: 2021-02-05

## 2021-02-05 PROCEDURE — 91300 HC SARSCOV02 VAC 30MCG/0.3ML IM: CPT | Performed by: INTERNAL MEDICINE

## 2021-02-05 PROCEDURE — 0001A: CPT | Performed by: INTERNAL MEDICINE

## 2021-02-26 ENCOUNTER — IMMUNIZATION (OUTPATIENT)
Dept: VACCINE CLINIC | Facility: HOSPITAL | Age: 71
End: 2021-02-26

## 2021-02-26 PROCEDURE — 91300 HC SARSCOV02 VAC 30MCG/0.3ML IM: CPT | Performed by: INTERNAL MEDICINE

## 2021-02-26 PROCEDURE — 0002A: CPT | Performed by: INTERNAL MEDICINE

## 2021-05-21 ENCOUNTER — PROCEDURE VISIT (OUTPATIENT)
Dept: OBSTETRICS AND GYNECOLOGY | Age: 71
End: 2021-05-21

## 2021-05-21 ENCOUNTER — OFFICE VISIT (OUTPATIENT)
Dept: OBSTETRICS AND GYNECOLOGY | Age: 71
End: 2021-05-21

## 2021-05-21 ENCOUNTER — APPOINTMENT (OUTPATIENT)
Dept: WOMENS IMAGING | Facility: HOSPITAL | Age: 71
End: 2021-05-21

## 2021-05-21 VITALS
HEIGHT: 62 IN | DIASTOLIC BLOOD PRESSURE: 68 MMHG | WEIGHT: 145.2 LBS | SYSTOLIC BLOOD PRESSURE: 128 MMHG | BODY MASS INDEX: 26.72 KG/M2

## 2021-05-21 DIAGNOSIS — Z01.419 WELL WOMAN EXAM WITH ROUTINE GYNECOLOGICAL EXAM: Primary | ICD-10-CM

## 2021-05-21 DIAGNOSIS — Z78.0 POST-MENOPAUSAL: Primary | ICD-10-CM

## 2021-05-21 DIAGNOSIS — M85.80 OSTEOPENIA, SENILE: ICD-10-CM

## 2021-05-21 DIAGNOSIS — Z12.31 VISIT FOR SCREENING MAMMOGRAM: Primary | ICD-10-CM

## 2021-05-21 PROCEDURE — 77067 SCR MAMMO BI INCL CAD: CPT | Performed by: RADIOLOGY

## 2021-05-21 PROCEDURE — G0101 CA SCREEN;PELVIC/BREAST EXAM: HCPCS | Performed by: OBSTETRICS & GYNECOLOGY

## 2021-05-21 PROCEDURE — 77080 DXA BONE DENSITY AXIAL: CPT | Performed by: OBSTETRICS & GYNECOLOGY

## 2021-05-21 PROCEDURE — 77067 SCR MAMMO BI INCL CAD: CPT | Performed by: OBSTETRICS & GYNECOLOGY

## 2021-05-21 RX ORDER — LEVOTHYROXINE SODIUM 0.1 MG/1
100 TABLET ORAL DAILY
COMMUNITY
Start: 2021-05-06

## 2021-05-21 RX ORDER — LORATADINE 10 MG/1
10 TABLET ORAL DAILY
COMMUNITY
Start: 2021-04-03

## 2021-09-30 ENCOUNTER — TELEPHONE (OUTPATIENT)
Dept: NEUROSURGERY | Facility: CLINIC | Age: 71
End: 2021-09-30

## 2021-09-30 DIAGNOSIS — D32.9 MENINGIOMA (HCC): Primary | ICD-10-CM

## 2021-10-06 ENCOUNTER — TELEPHONE (OUTPATIENT)
Dept: NEUROSURGERY | Facility: CLINIC | Age: 71
End: 2021-10-06

## 2021-11-02 ENCOUNTER — HOSPITAL ENCOUNTER (OUTPATIENT)
Dept: MRI IMAGING | Facility: HOSPITAL | Age: 71
Discharge: HOME | End: 2021-11-02
Admitting: NEUROLOGICAL SURGERY

## 2021-11-02 DIAGNOSIS — D32.9 MENINGIOMA (HCC): ICD-10-CM

## 2021-11-02 PROCEDURE — A9577 INJ MULTIHANCE: HCPCS | Performed by: NEUROLOGICAL SURGERY

## 2021-11-02 PROCEDURE — 70553 MRI BRAIN STEM W/O & W/DYE: CPT

## 2021-11-02 PROCEDURE — 82565 ASSAY OF CREATININE: CPT

## 2021-11-02 PROCEDURE — 0 GADOBENATE DIMEGLUMINE 529 MG/ML SOLUTION: Performed by: NEUROLOGICAL SURGERY

## 2021-11-02 RX ADMIN — GADOBENATE DIMEGLUMINE 13 ML: 529 INJECTION, SOLUTION INTRAVENOUS at 19:21

## 2021-11-04 ENCOUNTER — TELEPHONE (OUTPATIENT)
Dept: NEUROSURGERY | Facility: CLINIC | Age: 71
End: 2021-11-04

## 2021-11-05 ENCOUNTER — TELEPHONE (OUTPATIENT)
Dept: NEUROSURGERY | Facility: CLINIC | Age: 71
End: 2021-11-05

## 2021-11-10 LAB — CREAT BLDA-MCNC: 0.8 MG/DL (ref 0.6–1.3)

## 2021-12-14 ENCOUNTER — OFFICE VISIT (OUTPATIENT)
Dept: NEUROSURGERY | Facility: CLINIC | Age: 71
End: 2021-12-14

## 2021-12-14 VITALS
SYSTOLIC BLOOD PRESSURE: 121 MMHG | WEIGHT: 146 LBS | HEIGHT: 61 IN | HEART RATE: 89 BPM | TEMPERATURE: 98 F | DIASTOLIC BLOOD PRESSURE: 80 MMHG | BODY MASS INDEX: 27.56 KG/M2

## 2021-12-14 DIAGNOSIS — D32.9 MENINGIOMA (HCC): Primary | ICD-10-CM

## 2021-12-14 PROCEDURE — 99203 OFFICE O/P NEW LOW 30 MIN: CPT | Performed by: NEUROLOGICAL SURGERY

## 2021-12-14 RX ORDER — TRIAMCINOLONE ACETONIDE 1 MG/G
CREAM TOPICAL
COMMUNITY
End: 2022-07-26

## 2021-12-14 RX ORDER — MULTIVITAMIN
1 TABLET ORAL DAILY
COMMUNITY
Start: 2021-10-15

## 2021-12-14 RX ORDER — LORAZEPAM 0.5 MG/1
TABLET ORAL
COMMUNITY
Start: 2021-10-18 | End: 2022-07-26

## 2021-12-17 ENCOUNTER — PATIENT ROUNDING (BHMG ONLY) (OUTPATIENT)
Dept: NEUROSURGERY | Facility: CLINIC | Age: 71
End: 2021-12-17

## 2022-03-14 ENCOUNTER — TRANSCRIBE ORDERS (OUTPATIENT)
Dept: ADMINISTRATIVE | Facility: HOSPITAL | Age: 72
End: 2022-03-14

## 2022-03-14 DIAGNOSIS — N63.0 BREAST LUMP IN FEMALE: Primary | ICD-10-CM

## 2022-03-31 ENCOUNTER — APPOINTMENT (OUTPATIENT)
Dept: WOMENS IMAGING | Facility: HOSPITAL | Age: 72
End: 2022-03-31

## 2022-03-31 PROCEDURE — 77065 DX MAMMO INCL CAD UNI: CPT | Performed by: RADIOLOGY

## 2022-03-31 PROCEDURE — G0279 TOMOSYNTHESIS, MAMMO: HCPCS | Performed by: RADIOLOGY

## 2022-03-31 PROCEDURE — 76641 ULTRASOUND BREAST COMPLETE: CPT | Performed by: RADIOLOGY

## 2022-04-04 ENCOUNTER — TRANSCRIBE ORDERS (OUTPATIENT)
Dept: ADMINISTRATIVE | Facility: HOSPITAL | Age: 72
End: 2022-04-04

## 2022-04-04 DIAGNOSIS — Z85.05 PERSONAL HISTORY OF MALIGNANT NEOPLASM OF LIVER: Primary | ICD-10-CM

## 2022-04-13 ENCOUNTER — APPOINTMENT (OUTPATIENT)
Dept: ULTRASOUND IMAGING | Facility: HOSPITAL | Age: 72
End: 2022-04-13

## 2022-04-13 ENCOUNTER — APPOINTMENT (OUTPATIENT)
Dept: MAMMOGRAPHY | Facility: HOSPITAL | Age: 72
End: 2022-04-13

## 2022-05-04 ENCOUNTER — HOSPITAL ENCOUNTER (OUTPATIENT)
Dept: CT IMAGING | Facility: HOSPITAL | Age: 72
Discharge: HOME | End: 2022-05-04
Admitting: FAMILY MEDICINE

## 2022-05-04 DIAGNOSIS — Z85.05 PERSONAL HISTORY OF MALIGNANT NEOPLASM OF LIVER: ICD-10-CM

## 2022-05-04 LAB — CREAT BLDA-MCNC: 0.9 MG/DL (ref 0.6–1.3)

## 2022-05-04 PROCEDURE — 74178 CT ABD&PLV WO CNTR FLWD CNTR: CPT

## 2022-05-04 PROCEDURE — 25010000002 IOPAMIDOL 61 % SOLUTION: Performed by: FAMILY MEDICINE

## 2022-05-04 PROCEDURE — 71260 CT THORAX DX C+: CPT

## 2022-05-04 PROCEDURE — 82565 ASSAY OF CREATININE: CPT

## 2022-05-04 RX ADMIN — IOPAMIDOL 100 ML: 612 INJECTION, SOLUTION INTRAVENOUS at 06:57

## 2022-05-31 ENCOUNTER — APPOINTMENT (OUTPATIENT)
Dept: WOMENS IMAGING | Facility: HOSPITAL | Age: 72
End: 2022-05-31

## 2022-05-31 PROCEDURE — G0279 TOMOSYNTHESIS, MAMMO: HCPCS | Performed by: RADIOLOGY

## 2022-05-31 PROCEDURE — 77066 DX MAMMO INCL CAD BI: CPT | Performed by: RADIOLOGY

## 2022-05-31 PROCEDURE — 76642 ULTRASOUND BREAST LIMITED: CPT | Performed by: RADIOLOGY

## 2022-06-03 ENCOUNTER — TELEPHONE (OUTPATIENT)
Dept: OBSTETRICS AND GYNECOLOGY | Age: 72
End: 2022-06-03

## 2022-07-05 ENCOUNTER — TELEPHONE (OUTPATIENT)
Dept: GASTROENTEROLOGY | Facility: CLINIC | Age: 72
End: 2022-07-05

## 2022-07-12 ENCOUNTER — TELEPHONE (OUTPATIENT)
Dept: GASTROENTEROLOGY | Facility: CLINIC | Age: 72
End: 2022-07-12

## 2022-07-26 ENCOUNTER — OFFICE VISIT (OUTPATIENT)
Dept: OBSTETRICS AND GYNECOLOGY | Age: 72
End: 2022-07-26

## 2022-07-26 VITALS
HEIGHT: 61 IN | SYSTOLIC BLOOD PRESSURE: 132 MMHG | WEIGHT: 148 LBS | DIASTOLIC BLOOD PRESSURE: 80 MMHG | BODY MASS INDEX: 27.94 KG/M2

## 2022-07-26 DIAGNOSIS — Z01.419 WELL WOMAN EXAM WITH ROUTINE GYNECOLOGICAL EXAM: Primary | ICD-10-CM

## 2022-07-26 DIAGNOSIS — Z12.11 COLON CANCER SCREENING: ICD-10-CM

## 2022-07-26 PROCEDURE — G0101 CA SCREEN;PELVIC/BREAST EXAM: HCPCS | Performed by: STUDENT IN AN ORGANIZED HEALTH CARE EDUCATION/TRAINING PROGRAM

## 2022-08-01 ENCOUNTER — TELEPHONE (OUTPATIENT)
Dept: GASTROENTEROLOGY | Facility: CLINIC | Age: 72
End: 2022-08-01

## 2022-08-18 ENCOUNTER — PRE-PROCEDURE SCREENING (OUTPATIENT)
Dept: GASTROENTEROLOGY | Facility: CLINIC | Age: 72
End: 2022-08-18

## 2022-08-19 ENCOUNTER — PREP FOR SURGERY (OUTPATIENT)
Dept: OTHER | Facility: HOSPITAL | Age: 72
End: 2022-08-19

## 2022-08-19 DIAGNOSIS — Z12.11 ENCOUNTER FOR SCREENING FOR MALIGNANT NEOPLASM OF COLON: Primary | ICD-10-CM

## 2022-09-21 ENCOUNTER — TELEPHONE (OUTPATIENT)
Dept: GASTROENTEROLOGY | Facility: CLINIC | Age: 72
End: 2022-09-21

## 2022-10-13 PROBLEM — Z12.11 ENCOUNTER FOR SCREENING FOR MALIGNANT NEOPLASM OF COLON: Status: ACTIVE | Noted: 2022-10-13

## 2022-10-20 ENCOUNTER — TELEPHONE (OUTPATIENT)
Dept: GASTROENTEROLOGY | Facility: CLINIC | Age: 72
End: 2022-10-20

## 2022-10-25 ENCOUNTER — TELEPHONE (OUTPATIENT)
Dept: GASTROENTEROLOGY | Facility: CLINIC | Age: 72
End: 2022-10-25

## 2022-11-29 ENCOUNTER — ANESTHESIA (OUTPATIENT)
Dept: GASTROENTEROLOGY | Facility: HOSPITAL | Age: 72
End: 2022-11-29

## 2022-11-29 ENCOUNTER — ANESTHESIA EVENT (OUTPATIENT)
Dept: GASTROENTEROLOGY | Facility: HOSPITAL | Age: 72
End: 2022-11-29

## 2022-11-29 ENCOUNTER — HOSPITAL ENCOUNTER (OUTPATIENT)
Facility: HOSPITAL | Age: 72
Setting detail: HOSPITAL OUTPATIENT SURGERY
Discharge: HOME | End: 2022-11-29
Attending: INTERNAL MEDICINE | Admitting: INTERNAL MEDICINE

## 2022-11-29 VITALS
SYSTOLIC BLOOD PRESSURE: 117 MMHG | WEIGHT: 146 LBS | BODY MASS INDEX: 27.56 KG/M2 | OXYGEN SATURATION: 98 % | DIASTOLIC BLOOD PRESSURE: 65 MMHG | TEMPERATURE: 98.3 F | HEART RATE: 62 BPM | RESPIRATION RATE: 20 BRPM | HEIGHT: 61 IN

## 2022-11-29 DIAGNOSIS — Z12.11 ENCOUNTER FOR SCREENING FOR MALIGNANT NEOPLASM OF COLON: ICD-10-CM

## 2022-11-29 PROCEDURE — 88305 TISSUE EXAM BY PATHOLOGIST: CPT | Performed by: INTERNAL MEDICINE

## 2022-11-29 PROCEDURE — S0260 H&P FOR SURGERY: HCPCS | Performed by: INTERNAL MEDICINE

## 2022-11-29 PROCEDURE — 25010000002 PROPOFOL 10 MG/ML EMULSION: Performed by: STUDENT IN AN ORGANIZED HEALTH CARE EDUCATION/TRAINING PROGRAM

## 2022-11-29 PROCEDURE — 45380 COLONOSCOPY AND BIOPSY: CPT | Performed by: INTERNAL MEDICINE

## 2022-11-29 PROCEDURE — 45385 COLONOSCOPY W/LESION REMOVAL: CPT | Performed by: INTERNAL MEDICINE

## 2022-11-29 RX ORDER — SODIUM CHLORIDE 9 MG/ML
40 INJECTION, SOLUTION INTRAVENOUS AS NEEDED
Status: DISCONTINUED | OUTPATIENT
Start: 2022-11-29 | End: 2022-11-29 | Stop reason: HOSPADM

## 2022-11-29 RX ORDER — LIDOCAINE HYDROCHLORIDE 20 MG/ML
INJECTION, SOLUTION INFILTRATION; PERINEURAL AS NEEDED
Status: DISCONTINUED | OUTPATIENT
Start: 2022-11-29 | End: 2022-11-29 | Stop reason: SURG

## 2022-11-29 RX ORDER — SODIUM CHLORIDE 0.9 % (FLUSH) 0.9 %
10 SYRINGE (ML) INJECTION AS NEEDED
Status: DISCONTINUED | OUTPATIENT
Start: 2022-11-29 | End: 2022-11-29 | Stop reason: HOSPADM

## 2022-11-29 RX ORDER — SODIUM CHLORIDE 0.9 % (FLUSH) 0.9 %
10 SYRINGE (ML) INJECTION EVERY 12 HOURS SCHEDULED
Status: DISCONTINUED | OUTPATIENT
Start: 2022-11-29 | End: 2022-11-29 | Stop reason: HOSPADM

## 2022-11-29 RX ORDER — PROPOFOL 10 MG/ML
VIAL (ML) INTRAVENOUS AS NEEDED
Status: DISCONTINUED | OUTPATIENT
Start: 2022-11-29 | End: 2022-11-29 | Stop reason: SURG

## 2022-11-29 RX ORDER — SODIUM CHLORIDE, SODIUM LACTATE, POTASSIUM CHLORIDE, CALCIUM CHLORIDE 600; 310; 30; 20 MG/100ML; MG/100ML; MG/100ML; MG/100ML
30 INJECTION, SOLUTION INTRAVENOUS CONTINUOUS PRN
Status: DISCONTINUED | OUTPATIENT
Start: 2022-11-29 | End: 2022-11-29 | Stop reason: HOSPADM

## 2022-11-29 RX ADMIN — LIDOCAINE HYDROCHLORIDE 60 MG: 20 INJECTION, SOLUTION INFILTRATION; PERINEURAL at 16:15

## 2022-11-29 RX ADMIN — PROPOFOL 80 MG: 10 INJECTION, EMULSION INTRAVENOUS at 16:15

## 2022-11-29 RX ADMIN — SODIUM CHLORIDE, POTASSIUM CHLORIDE, SODIUM LACTATE AND CALCIUM CHLORIDE 30 ML/HR: 600; 310; 30; 20 INJECTION, SOLUTION INTRAVENOUS at 14:25

## 2022-11-29 RX ADMIN — PROPOFOL 160 MCG/KG/MIN: 10 INJECTION, EMULSION INTRAVENOUS at 16:16

## 2022-12-01 LAB
LAB AP CASE REPORT: NORMAL
PATH REPORT.FINAL DX SPEC: NORMAL
PATH REPORT.GROSS SPEC: NORMAL

## 2022-12-05 ENCOUNTER — TELEPHONE (OUTPATIENT)
Dept: GASTROENTEROLOGY | Facility: CLINIC | Age: 72
End: 2022-12-05

## 2022-12-27 ENCOUNTER — TRANSCRIBE ORDERS (OUTPATIENT)
Dept: ADMINISTRATIVE | Facility: HOSPITAL | Age: 72
End: 2022-12-27

## 2022-12-27 DIAGNOSIS — R59.1 LYMPHADENOPATHY: Primary | ICD-10-CM

## 2023-01-18 ENCOUNTER — HOSPITAL ENCOUNTER (OUTPATIENT)
Dept: ULTRASOUND IMAGING | Facility: HOSPITAL | Age: 73
End: 2023-01-18
Payer: MEDICARE

## 2023-08-17 ENCOUNTER — HOSPITAL ENCOUNTER (OUTPATIENT)
Facility: HOSPITAL | Age: 73
Discharge: HOME | End: 2023-08-17
Payer: MEDICARE

## 2023-08-17 ENCOUNTER — OFFICE VISIT (OUTPATIENT)
Dept: OBSTETRICS AND GYNECOLOGY | Age: 73
End: 2023-08-17
Payer: MEDICARE

## 2023-08-17 VITALS
BODY MASS INDEX: 27.45 KG/M2 | DIASTOLIC BLOOD PRESSURE: 76 MMHG | HEIGHT: 61 IN | SYSTOLIC BLOOD PRESSURE: 132 MMHG | WEIGHT: 145.4 LBS

## 2023-08-17 DIAGNOSIS — Z01.419 WELL WOMAN EXAM WITH ROUTINE GYNECOLOGICAL EXAM: Primary | ICD-10-CM

## 2023-08-17 DIAGNOSIS — M85.80 OSTEOPENIA, SENILE: ICD-10-CM

## 2023-08-17 DIAGNOSIS — Z12.31 SCREENING MAMMOGRAM FOR BREAST CANCER: ICD-10-CM

## 2023-08-17 DIAGNOSIS — Z78.0 POST-MENOPAUSAL: ICD-10-CM

## 2023-08-17 DIAGNOSIS — N95.2 VAGINAL ATROPHY: ICD-10-CM

## 2023-08-17 PROBLEM — Z12.11 ENCOUNTER FOR SCREENING FOR MALIGNANT NEOPLASM OF COLON: Status: RESOLVED | Noted: 2022-10-13 | Resolved: 2023-08-17

## 2023-08-17 PROBLEM — K25.0 ACUTE GASTRIC ULCER WITH HEMORRHAGE: Status: RESOLVED | Noted: 2018-12-21 | Resolved: 2023-08-17

## 2023-08-17 PROCEDURE — 77067 SCR MAMMO BI INCL CAD: CPT

## 2023-08-17 PROCEDURE — 77063 BREAST TOMOSYNTHESIS BI: CPT

## 2023-08-17 PROCEDURE — 77080 DXA BONE DENSITY AXIAL: CPT

## 2023-08-17 RX ORDER — ESTRADIOL 0.1 MG/G
2 CREAM VAGINAL 2 TIMES WEEKLY
Qty: 42.5 G | Refills: 12 | Status: SHIPPED | OUTPATIENT
Start: 2023-08-17

## 2023-08-17 RX ORDER — BENZONATATE 100 MG/1
1 CAPSULE, LIQUID FILLED ORAL DAILY
COMMUNITY
Start: 2023-07-16

## 2023-08-24 DIAGNOSIS — Z12.31 VISIT FOR SCREENING MAMMOGRAM: Primary | ICD-10-CM

## 2024-02-09 ENCOUNTER — TRANSCRIBE ORDERS (OUTPATIENT)
Dept: ADMINISTRATIVE | Facility: HOSPITAL | Age: 74
End: 2024-02-09
Payer: MEDICARE

## 2024-02-09 DIAGNOSIS — R59.0 LOCALIZED ENLARGED LYMPH NODES: ICD-10-CM

## 2024-02-13 ENCOUNTER — TRANSCRIBE ORDERS (OUTPATIENT)
Dept: ADMINISTRATIVE | Facility: HOSPITAL | Age: 74
End: 2024-02-13
Payer: MEDICARE

## 2024-02-13 DIAGNOSIS — M54.12 CERVICAL RADICULOPATHY: Primary | ICD-10-CM

## 2024-02-29 ENCOUNTER — HOSPITAL ENCOUNTER (OUTPATIENT)
Facility: HOSPITAL | Age: 74
Discharge: HOME | End: 2024-02-29
Admitting: FAMILY MEDICINE
Payer: MEDICARE

## 2024-02-29 DIAGNOSIS — M54.12 CERVICAL RADICULOPATHY: ICD-10-CM

## 2024-02-29 PROCEDURE — 70491 CT SOFT TISSUE NECK W/DYE: CPT

## 2024-02-29 PROCEDURE — 25510000001 IOPAMIDOL 61 % SOLUTION: Performed by: FAMILY MEDICINE

## 2024-02-29 RX ADMIN — IOPAMIDOL 100 ML: 612 INJECTION, SOLUTION INTRAVENOUS at 15:25

## 2024-03-07 ENCOUNTER — TRANSCRIBE ORDERS (OUTPATIENT)
Dept: GENERAL RADIOLOGY | Facility: HOSPITAL | Age: 74
End: 2024-03-07
Payer: MEDICARE

## 2024-03-07 DIAGNOSIS — R22.1 MASS OF LEFT SIDE OF NECK: Primary | ICD-10-CM

## 2024-04-01 ENCOUNTER — HOSPITAL ENCOUNTER (OUTPATIENT)
Dept: ULTRASOUND IMAGING | Facility: HOSPITAL | Age: 74
Discharge: HOME | End: 2024-04-01
Payer: MEDICARE

## 2024-04-01 VITALS
BODY MASS INDEX: 26.5 KG/M2 | TEMPERATURE: 98.4 F | HEIGHT: 62 IN | SYSTOLIC BLOOD PRESSURE: 132 MMHG | WEIGHT: 144 LBS | RESPIRATION RATE: 16 BRPM | DIASTOLIC BLOOD PRESSURE: 82 MMHG | OXYGEN SATURATION: 98 % | HEART RATE: 74 BPM

## 2024-04-01 DIAGNOSIS — R22.1 MASS OF LEFT SIDE OF NECK: ICD-10-CM

## 2024-04-01 PROCEDURE — 25010000002 LIDOCAINE 1 % SOLUTION: Performed by: RADIOLOGY

## 2024-04-01 PROCEDURE — 76942 ECHO GUIDE FOR BIOPSY: CPT

## 2024-04-01 RX ORDER — LIDOCAINE HYDROCHLORIDE 10 MG/ML
10 INJECTION, SOLUTION INFILTRATION; PERINEURAL ONCE
Status: COMPLETED | OUTPATIENT
Start: 2024-04-01 | End: 2024-04-01

## 2024-04-01 RX ADMIN — LIDOCAINE HYDROCHLORIDE 5 ML: 10 INJECTION, SOLUTION INFILTRATION; PERINEURAL at 11:44

## 2024-04-10 LAB
DX PRELIMINARY: NORMAL
LAB AP CASE REPORT: NORMAL
LAB AP CLINICAL INFORMATION: NORMAL
LAB AP FLOW CYTOMETRY SUMMARY: NORMAL
LAB AP SPECIAL STAINS: NORMAL
PATH REPORT.FINAL DX SPEC: NORMAL
PATH REPORT.GROSS SPEC: NORMAL

## 2024-04-16 ENCOUNTER — TELEPHONE (OUTPATIENT)
Dept: ONCOLOGY | Facility: CLINIC | Age: 74
End: 2024-04-16

## 2024-04-23 ENCOUNTER — CONSULT (OUTPATIENT)
Dept: ONCOLOGY | Facility: CLINIC | Age: 74
End: 2024-04-23
Payer: MEDICARE

## 2024-04-23 ENCOUNTER — LAB (OUTPATIENT)
Dept: LAB | Facility: HOSPITAL | Age: 74
End: 2024-04-23
Payer: MEDICARE

## 2024-04-23 VITALS
HEART RATE: 72 BPM | SYSTOLIC BLOOD PRESSURE: 151 MMHG | WEIGHT: 129.37 LBS | TEMPERATURE: 97.5 F | RESPIRATION RATE: 16 BRPM | BODY MASS INDEX: 23.81 KG/M2 | DIASTOLIC BLOOD PRESSURE: 88 MMHG | HEIGHT: 62 IN

## 2024-04-23 DIAGNOSIS — C83.00 SMALL LYMPHOCYTIC LYMPHOMA: Primary | ICD-10-CM

## 2024-04-23 DIAGNOSIS — R79.89 ABNORMAL CBC: Primary | ICD-10-CM

## 2024-04-23 LAB
ALBUMIN SERPL-MCNC: 4 G/DL (ref 3.5–5.2)
ALBUMIN/GLOB SERPL: 1.2 G/DL
ALP SERPL-CCNC: 146 U/L (ref 39–117)
ALT SERPL W P-5'-P-CCNC: 14 U/L (ref 1–33)
ANION GAP SERPL CALCULATED.3IONS-SCNC: 10.4 MMOL/L (ref 5–15)
AST SERPL-CCNC: 24 U/L (ref 1–32)
B2 MICROGLOB SERPL-MCNC: 2.3 MG/L (ref 0.8–2.2)
BASOPHILS # BLD AUTO: 0.05 10*3/MM3 (ref 0–0.2)
BASOPHILS NFR BLD AUTO: 0.8 % (ref 0–1.5)
BILIRUB SERPL-MCNC: 0.4 MG/DL (ref 0–1.2)
BUN SERPL-MCNC: 14 MG/DL (ref 8–23)
BUN/CREAT SERPL: 18.7 (ref 7–25)
CALCIUM SPEC-SCNC: 9.9 MG/DL (ref 8.6–10.5)
CHLORIDE SERPL-SCNC: 102 MMOL/L (ref 98–107)
CO2 SERPL-SCNC: 24.6 MMOL/L (ref 22–29)
CREAT SERPL-MCNC: 0.75 MG/DL (ref 0.57–1)
DEPRECATED RDW RBC AUTO: 43.5 FL (ref 37–54)
EGFRCR SERPLBLD CKD-EPI 2021: 83.7 ML/MIN/1.73
EOSINOPHIL # BLD AUTO: 0.41 10*3/MM3 (ref 0–0.4)
EOSINOPHIL NFR BLD AUTO: 6.5 % (ref 0.3–6.2)
ERYTHROCYTE [DISTWIDTH] IN BLOOD BY AUTOMATED COUNT: 13.6 % (ref 12.3–15.4)
GLOBULIN UR ELPH-MCNC: 3.4 GM/DL
GLUCOSE SERPL-MCNC: 110 MG/DL (ref 65–99)
HCT VFR BLD AUTO: 41.4 % (ref 34–46.6)
HGB BLD-MCNC: 13.7 G/DL (ref 12–15.9)
IMM GRANULOCYTES # BLD AUTO: 0.07 10*3/MM3 (ref 0–0.05)
IMM GRANULOCYTES NFR BLD AUTO: 1.1 % (ref 0–0.5)
LDH SERPL-CCNC: 198 U/L (ref 135–214)
LYMPHOCYTES # BLD AUTO: 2.36 10*3/MM3 (ref 0.7–3.1)
LYMPHOCYTES NFR BLD AUTO: 37.6 % (ref 19.6–45.3)
MCH RBC QN AUTO: 29.2 PG (ref 26.6–33)
MCHC RBC AUTO-ENTMCNC: 33.1 G/DL (ref 31.5–35.7)
MCV RBC AUTO: 88.3 FL (ref 79–97)
MONOCYTES # BLD AUTO: 0.84 10*3/MM3 (ref 0.1–0.9)
MONOCYTES NFR BLD AUTO: 13.4 % (ref 5–12)
NEUTROPHILS NFR BLD AUTO: 2.55 10*3/MM3 (ref 1.7–7)
NEUTROPHILS NFR BLD AUTO: 40.6 % (ref 42.7–76)
NRBC BLD AUTO-RTO: 0 /100 WBC (ref 0–0.2)
PLATELET # BLD AUTO: 207 10*3/MM3 (ref 140–450)
PMV BLD AUTO: 10.6 FL (ref 6–12)
POTASSIUM SERPL-SCNC: 4.4 MMOL/L (ref 3.5–5.2)
PROT SERPL-MCNC: 7.4 G/DL (ref 6–8.5)
RBC # BLD AUTO: 4.69 10*6/MM3 (ref 3.77–5.28)
SODIUM SERPL-SCNC: 137 MMOL/L (ref 136–145)
URATE SERPL-MCNC: 6.1 MG/DL (ref 2.4–5.7)
WBC NRBC COR # BLD AUTO: 6.28 10*3/MM3 (ref 3.4–10.8)

## 2024-04-23 PROCEDURE — 36415 COLL VENOUS BLD VENIPUNCTURE: CPT

## 2024-04-23 PROCEDURE — 83615 LACTATE (LD) (LDH) ENZYME: CPT | Performed by: INTERNAL MEDICINE

## 2024-04-23 PROCEDURE — 80053 COMPREHEN METABOLIC PANEL: CPT | Performed by: INTERNAL MEDICINE

## 2024-04-23 PROCEDURE — 99205 OFFICE O/P NEW HI 60 MIN: CPT | Performed by: INTERNAL MEDICINE

## 2024-04-23 PROCEDURE — 84550 ASSAY OF BLOOD/URIC ACID: CPT | Performed by: INTERNAL MEDICINE

## 2024-04-23 PROCEDURE — 82232 ASSAY OF BETA-2 PROTEIN: CPT | Performed by: INTERNAL MEDICINE

## 2024-04-23 PROCEDURE — 1160F RVW MEDS BY RX/DR IN RCRD: CPT | Performed by: INTERNAL MEDICINE

## 2024-04-23 PROCEDURE — 85025 COMPLETE CBC W/AUTO DIFF WBC: CPT

## 2024-04-23 PROCEDURE — 1126F AMNT PAIN NOTED NONE PRSNT: CPT | Performed by: INTERNAL MEDICINE

## 2024-04-23 PROCEDURE — 1159F MED LIST DOCD IN RCRD: CPT | Performed by: INTERNAL MEDICINE

## 2024-04-25 LAB — Lab: NORMAL

## 2024-04-26 ENCOUNTER — HOSPITAL ENCOUNTER (OUTPATIENT)
Dept: PET IMAGING | Facility: HOSPITAL | Age: 74
Discharge: HOME | End: 2024-04-26
Payer: MEDICARE

## 2024-04-26 DIAGNOSIS — C83.00 SMALL LYMPHOCYTIC LYMPHOMA: ICD-10-CM

## 2024-04-26 PROCEDURE — 74177 CT ABD & PELVIS W/CONTRAST: CPT

## 2024-04-26 PROCEDURE — 70491 CT SOFT TISSUE NECK W/DYE: CPT

## 2024-04-26 PROCEDURE — 25510000001 IOPAMIDOL 61 % SOLUTION: Performed by: INTERNAL MEDICINE

## 2024-04-26 PROCEDURE — 71260 CT THORAX DX C+: CPT

## 2024-04-26 PROCEDURE — 0 DIATRIZOATE MEGLUMINE & SODIUM PER 1 ML: Performed by: INTERNAL MEDICINE

## 2024-04-26 RX ADMIN — DIATRIZOATE MEGLUMINE AND DIATRIZOATE SODIUM 30 ML: 660; 100 LIQUID ORAL; RECTAL at 12:17

## 2024-04-26 RX ADMIN — IOPAMIDOL 85 ML: 612 INJECTION, SOLUTION INTRAVENOUS at 13:10

## 2024-04-29 DIAGNOSIS — C83.00 SMALL LYMPHOCYTIC LYMPHOMA: Primary | ICD-10-CM

## 2024-04-29 LAB — REF LAB TEST METHOD: NORMAL

## 2024-04-30 ENCOUNTER — TELEPHONE (OUTPATIENT)
Dept: ONCOLOGY | Facility: CLINIC | Age: 74
End: 2024-04-30
Payer: MEDICARE

## 2024-04-30 LAB — CLL TARGETGENE PANEL RESULT: NORMAL

## 2024-05-02 ENCOUNTER — HOSPITAL ENCOUNTER (OUTPATIENT)
Dept: ULTRASOUND IMAGING | Facility: HOSPITAL | Age: 74
Discharge: HOME | End: 2024-05-02
Payer: MEDICARE

## 2024-05-02 VITALS
RESPIRATION RATE: 16 BRPM | HEART RATE: 71 BPM | HEIGHT: 62 IN | TEMPERATURE: 98.2 F | WEIGHT: 145 LBS | BODY MASS INDEX: 26.68 KG/M2 | DIASTOLIC BLOOD PRESSURE: 78 MMHG | OXYGEN SATURATION: 97 % | SYSTOLIC BLOOD PRESSURE: 132 MMHG

## 2024-05-02 DIAGNOSIS — C83.00 SMALL LYMPHOCYTIC LYMPHOMA: ICD-10-CM

## 2024-05-02 PROCEDURE — 76942 ECHO GUIDE FOR BIOPSY: CPT

## 2024-05-02 PROCEDURE — 25010000002 LIDOCAINE 1 % SOLUTION: Performed by: RADIOLOGY

## 2024-05-02 PROCEDURE — 88300 SURGICAL PATH GROSS: CPT | Performed by: INTERNAL MEDICINE

## 2024-05-02 RX ORDER — SODIUM CHLORIDE 0.9 % (FLUSH) 0.9 %
10 SYRINGE (ML) INJECTION AS NEEDED
Status: DISCONTINUED | OUTPATIENT
Start: 2024-05-02 | End: 2024-05-03 | Stop reason: HOSPADM

## 2024-05-02 RX ORDER — LIDOCAINE HYDROCHLORIDE 10 MG/ML
10 INJECTION, SOLUTION INFILTRATION; PERINEURAL ONCE
Status: COMPLETED | OUTPATIENT
Start: 2024-05-02 | End: 2024-05-02

## 2024-05-02 RX ADMIN — LIDOCAINE HYDROCHLORIDE 3 ML: 10 INJECTION, SOLUTION INFILTRATION; PERINEURAL at 09:04

## 2024-05-03 ENCOUNTER — TELEPHONE (OUTPATIENT)
Dept: INTERVENTIONAL RADIOLOGY/VASCULAR | Facility: HOSPITAL | Age: 74
End: 2024-05-03
Payer: MEDICARE

## 2024-05-06 LAB
LAB AP CASE REPORT: NORMAL
LAB AP CLINICAL INFORMATION: NORMAL
PATH REPORT.ADDENDUM SPEC: NORMAL
PATH REPORT.FINAL DX SPEC: NORMAL
PATH REPORT.GROSS SPEC: NORMAL

## 2024-05-07 ENCOUNTER — OFFICE VISIT (OUTPATIENT)
Dept: ONCOLOGY | Facility: CLINIC | Age: 74
End: 2024-05-07
Payer: MEDICARE

## 2024-05-07 ENCOUNTER — TELEPHONE (OUTPATIENT)
Dept: ONCOLOGY | Facility: CLINIC | Age: 74
End: 2024-05-07

## 2024-05-07 ENCOUNTER — LAB (OUTPATIENT)
Dept: LAB | Facility: HOSPITAL | Age: 74
End: 2024-05-07
Payer: MEDICARE

## 2024-05-07 VITALS
HEIGHT: 62 IN | HEART RATE: 72 BPM | BODY MASS INDEX: 27.4 KG/M2 | OXYGEN SATURATION: 98 % | WEIGHT: 148.9 LBS | DIASTOLIC BLOOD PRESSURE: 80 MMHG | RESPIRATION RATE: 16 BRPM | TEMPERATURE: 97.8 F | SYSTOLIC BLOOD PRESSURE: 134 MMHG

## 2024-05-07 DIAGNOSIS — R61 CHRONIC NIGHT SWEATS: ICD-10-CM

## 2024-05-07 DIAGNOSIS — C83.00 SMALL LYMPHOCYTIC LYMPHOMA: ICD-10-CM

## 2024-05-07 DIAGNOSIS — C83.00 SMALL LYMPHOCYTIC LYMPHOMA: Primary | ICD-10-CM

## 2024-05-07 LAB
ALBUMIN SERPL-MCNC: 4 G/DL (ref 3.5–5.2)
ALBUMIN/GLOB SERPL: 1.3 G/DL
ALP SERPL-CCNC: 133 U/L (ref 39–117)
ALT SERPL W P-5'-P-CCNC: 16 U/L (ref 1–33)
ANION GAP SERPL CALCULATED.3IONS-SCNC: 11.2 MMOL/L (ref 5–15)
AST SERPL-CCNC: 22 U/L (ref 1–32)
BASOPHILS # BLD AUTO: 0.04 10*3/MM3 (ref 0–0.2)
BASOPHILS NFR BLD AUTO: 0.8 % (ref 0–1.5)
BILIRUB SERPL-MCNC: 0.4 MG/DL (ref 0–1.2)
BUN SERPL-MCNC: 14 MG/DL (ref 8–23)
BUN/CREAT SERPL: 18.2 (ref 7–25)
CALCIUM SPEC-SCNC: 8.3 MG/DL (ref 8.6–10.5)
CHLORIDE SERPL-SCNC: 104 MMOL/L (ref 98–107)
CO2 SERPL-SCNC: 22.8 MMOL/L (ref 22–29)
CREAT SERPL-MCNC: 0.77 MG/DL (ref 0.57–1)
DEPRECATED RDW RBC AUTO: 43.8 FL (ref 37–54)
EGFRCR SERPLBLD CKD-EPI 2021: 81.1 ML/MIN/1.73
EOSINOPHIL # BLD AUTO: 0.43 10*3/MM3 (ref 0–0.4)
EOSINOPHIL NFR BLD AUTO: 8.4 % (ref 0.3–6.2)
ERYTHROCYTE [DISTWIDTH] IN BLOOD BY AUTOMATED COUNT: 13.3 % (ref 12.3–15.4)
GLOBULIN UR ELPH-MCNC: 3 GM/DL
GLUCOSE SERPL-MCNC: 103 MG/DL (ref 65–99)
HCT VFR BLD AUTO: 40.9 % (ref 34–46.6)
HGB BLD-MCNC: 13 G/DL (ref 12–15.9)
IMM GRANULOCYTES # BLD AUTO: 0.02 10*3/MM3 (ref 0–0.05)
IMM GRANULOCYTES NFR BLD AUTO: 0.4 % (ref 0–0.5)
LDH SERPL-CCNC: 488 U/L (ref 135–214)
LYMPHOCYTES # BLD AUTO: 1.9 10*3/MM3 (ref 0.7–3.1)
LYMPHOCYTES NFR BLD AUTO: 37.3 % (ref 19.6–45.3)
MCH RBC QN AUTO: 28.8 PG (ref 26.6–33)
MCHC RBC AUTO-ENTMCNC: 31.8 G/DL (ref 31.5–35.7)
MCV RBC AUTO: 90.5 FL (ref 79–97)
MONOCYTES # BLD AUTO: 0.82 10*3/MM3 (ref 0.1–0.9)
MONOCYTES NFR BLD AUTO: 16.1 % (ref 5–12)
NEUTROPHILS NFR BLD AUTO: 1.88 10*3/MM3 (ref 1.7–7)
NEUTROPHILS NFR BLD AUTO: 37 % (ref 42.7–76)
NRBC BLD AUTO-RTO: 0 /100 WBC (ref 0–0.2)
PLATELET # BLD AUTO: 232 10*3/MM3 (ref 140–450)
PMV BLD AUTO: 9.9 FL (ref 6–12)
POTASSIUM SERPL-SCNC: 4 MMOL/L (ref 3.5–5.2)
PROT SERPL-MCNC: 7 G/DL (ref 6–8.5)
RBC # BLD AUTO: 4.52 10*6/MM3 (ref 3.77–5.28)
SODIUM SERPL-SCNC: 138 MMOL/L (ref 136–145)
T4 FREE SERPL-MCNC: 1.73 NG/DL (ref 0.93–1.7)
TSH SERPL DL<=0.05 MIU/L-ACNC: 0.34 UIU/ML (ref 0.27–4.2)
WBC NRBC COR # BLD AUTO: 5.09 10*3/MM3 (ref 3.4–10.8)

## 2024-05-07 PROCEDURE — 85025 COMPLETE CBC W/AUTO DIFF WBC: CPT

## 2024-05-07 PROCEDURE — 80053 COMPREHEN METABOLIC PANEL: CPT

## 2024-05-07 PROCEDURE — 36415 COLL VENOUS BLD VENIPUNCTURE: CPT

## 2024-05-07 PROCEDURE — 83615 LACTATE (LD) (LDH) ENZYME: CPT

## 2024-05-07 PROCEDURE — 84443 ASSAY THYROID STIM HORMONE: CPT | Performed by: INTERNAL MEDICINE

## 2024-05-07 PROCEDURE — 84439 ASSAY OF FREE THYROXINE: CPT | Performed by: INTERNAL MEDICINE

## 2024-05-07 RX ORDER — LORAZEPAM 0.5 MG/1
1 TABLET ORAL 3 TIMES DAILY
COMMUNITY
Start: 2024-04-11

## 2024-05-21 ENCOUNTER — LAB (OUTPATIENT)
Dept: LAB | Facility: HOSPITAL | Age: 74
End: 2024-05-21
Payer: MEDICARE

## 2024-05-21 ENCOUNTER — CLINICAL SUPPORT (OUTPATIENT)
Dept: ONCOLOGY | Facility: HOSPITAL | Age: 74
End: 2024-05-21
Payer: MEDICARE

## 2024-05-21 DIAGNOSIS — C83.00 SMALL LYMPHOCYTIC LYMPHOMA: ICD-10-CM

## 2024-05-21 LAB
ALBUMIN SERPL-MCNC: 3.9 G/DL (ref 3.5–5.2)
ALBUMIN/GLOB SERPL: 1.2 G/DL
ALP SERPL-CCNC: 135 U/L (ref 39–117)
ALT SERPL W P-5'-P-CCNC: 17 U/L (ref 1–33)
ANION GAP SERPL CALCULATED.3IONS-SCNC: 10.3 MMOL/L (ref 5–15)
AST SERPL-CCNC: 25 U/L (ref 1–32)
BASOPHILS # BLD AUTO: 0.04 10*3/MM3 (ref 0–0.2)
BASOPHILS NFR BLD AUTO: 0.6 % (ref 0–1.5)
BILIRUB SERPL-MCNC: 0.4 MG/DL (ref 0–1.2)
BUN SERPL-MCNC: 15 MG/DL (ref 8–23)
BUN/CREAT SERPL: 19 (ref 7–25)
CALCIUM SPEC-SCNC: 9.8 MG/DL (ref 8.6–10.5)
CHLORIDE SERPL-SCNC: 102 MMOL/L (ref 98–107)
CO2 SERPL-SCNC: 25.7 MMOL/L (ref 22–29)
CREAT SERPL-MCNC: 0.79 MG/DL (ref 0.57–1)
DEPRECATED RDW RBC AUTO: 43 FL (ref 37–54)
EGFRCR SERPLBLD CKD-EPI 2021: 78.6 ML/MIN/1.73
EOSINOPHIL # BLD AUTO: 0.34 10*3/MM3 (ref 0–0.4)
EOSINOPHIL NFR BLD AUTO: 5.3 % (ref 0.3–6.2)
ERYTHROCYTE [DISTWIDTH] IN BLOOD BY AUTOMATED COUNT: 13.4 % (ref 12.3–15.4)
GLOBULIN UR ELPH-MCNC: 3.2 GM/DL
GLUCOSE SERPL-MCNC: 96 MG/DL (ref 65–99)
HCT VFR BLD AUTO: 39.5 % (ref 34–46.6)
HGB BLD-MCNC: 13.1 G/DL (ref 12–15.9)
IMM GRANULOCYTES # BLD AUTO: 0.06 10*3/MM3 (ref 0–0.05)
IMM GRANULOCYTES NFR BLD AUTO: 0.9 % (ref 0–0.5)
LDH SERPL-CCNC: 210 U/L (ref 135–214)
LYMPHOCYTES # BLD AUTO: 1.95 10*3/MM3 (ref 0.7–3.1)
LYMPHOCYTES NFR BLD AUTO: 30.4 % (ref 19.6–45.3)
MCH RBC QN AUTO: 29.2 PG (ref 26.6–33)
MCHC RBC AUTO-ENTMCNC: 33.2 G/DL (ref 31.5–35.7)
MCV RBC AUTO: 88 FL (ref 79–97)
MONOCYTES # BLD AUTO: 1 10*3/MM3 (ref 0.1–0.9)
MONOCYTES NFR BLD AUTO: 15.6 % (ref 5–12)
NEUTROPHILS NFR BLD AUTO: 3.03 10*3/MM3 (ref 1.7–7)
NEUTROPHILS NFR BLD AUTO: 47.2 % (ref 42.7–76)
NRBC BLD AUTO-RTO: 0 /100 WBC (ref 0–0.2)
PLATELET # BLD AUTO: 240 10*3/MM3 (ref 140–450)
PMV BLD AUTO: 10.1 FL (ref 6–12)
POTASSIUM SERPL-SCNC: 4.4 MMOL/L (ref 3.5–5.2)
PROT SERPL-MCNC: 7.1 G/DL (ref 6–8.5)
RBC # BLD AUTO: 4.49 10*6/MM3 (ref 3.77–5.28)
SODIUM SERPL-SCNC: 138 MMOL/L (ref 136–145)
WBC NRBC COR # BLD AUTO: 6.42 10*3/MM3 (ref 3.4–10.8)

## 2024-05-21 PROCEDURE — 85025 COMPLETE CBC W/AUTO DIFF WBC: CPT

## 2024-05-21 PROCEDURE — 80053 COMPREHEN METABOLIC PANEL: CPT

## 2024-05-21 PROCEDURE — 36415 COLL VENOUS BLD VENIPUNCTURE: CPT

## 2024-05-21 PROCEDURE — 83615 LACTATE (LD) (LDH) ENZYME: CPT

## 2024-05-28 ENCOUNTER — TELEPHONE (OUTPATIENT)
Dept: ONCOLOGY | Facility: CLINIC | Age: 74
End: 2024-05-28
Payer: MEDICARE

## 2024-07-02 ENCOUNTER — OFFICE VISIT (OUTPATIENT)
Dept: ONCOLOGY | Facility: CLINIC | Age: 74
End: 2024-07-02
Payer: MEDICARE

## 2024-07-02 ENCOUNTER — LAB (OUTPATIENT)
Dept: LAB | Facility: HOSPITAL | Age: 74
End: 2024-07-02
Payer: MEDICARE

## 2024-07-02 VITALS
TEMPERATURE: 97.7 F | WEIGHT: 150.9 LBS | BODY MASS INDEX: 27.77 KG/M2 | HEART RATE: 70 BPM | DIASTOLIC BLOOD PRESSURE: 85 MMHG | OXYGEN SATURATION: 97 % | HEIGHT: 62 IN | SYSTOLIC BLOOD PRESSURE: 144 MMHG | RESPIRATION RATE: 16 BRPM

## 2024-07-02 DIAGNOSIS — C83.00 SMALL LYMPHOCYTIC LYMPHOMA: ICD-10-CM

## 2024-07-02 DIAGNOSIS — E03.8 OTHER SPECIFIED HYPOTHYROIDISM: Primary | ICD-10-CM

## 2024-07-02 LAB
ALBUMIN SERPL-MCNC: 3.7 G/DL (ref 3.5–5.2)
ALBUMIN/GLOB SERPL: 1.1 G/DL
ALP SERPL-CCNC: 138 U/L (ref 39–117)
ALT SERPL W P-5'-P-CCNC: 14 U/L (ref 1–33)
ANION GAP SERPL CALCULATED.3IONS-SCNC: 12.3 MMOL/L (ref 5–15)
AST SERPL-CCNC: 35 U/L (ref 1–32)
BASOPHILS # BLD AUTO: 0.04 10*3/MM3 (ref 0–0.2)
BASOPHILS NFR BLD AUTO: 0.7 % (ref 0–1.5)
BILIRUB SERPL-MCNC: 0.4 MG/DL (ref 0–1.2)
BUN SERPL-MCNC: 11 MG/DL (ref 8–23)
BUN/CREAT SERPL: 15.7 (ref 7–25)
CALCIUM SPEC-SCNC: 9.3 MG/DL (ref 8.6–10.5)
CHLORIDE SERPL-SCNC: 102 MMOL/L (ref 98–107)
CO2 SERPL-SCNC: 21.7 MMOL/L (ref 22–29)
CREAT SERPL-MCNC: 0.7 MG/DL (ref 0.57–1)
DEPRECATED RDW RBC AUTO: 43.7 FL (ref 37–54)
EGFRCR SERPLBLD CKD-EPI 2021: 90.9 ML/MIN/1.73
EOSINOPHIL # BLD AUTO: 0.29 10*3/MM3 (ref 0–0.4)
EOSINOPHIL NFR BLD AUTO: 5.4 % (ref 0.3–6.2)
ERYTHROCYTE [DISTWIDTH] IN BLOOD BY AUTOMATED COUNT: 13.3 % (ref 12.3–15.4)
GLOBULIN UR ELPH-MCNC: 3.3 GM/DL
GLUCOSE SERPL-MCNC: 147 MG/DL (ref 65–99)
HCT VFR BLD AUTO: 38.9 % (ref 34–46.6)
HGB BLD-MCNC: 13.1 G/DL (ref 12–15.9)
IMM GRANULOCYTES # BLD AUTO: 0.02 10*3/MM3 (ref 0–0.05)
IMM GRANULOCYTES NFR BLD AUTO: 0.4 % (ref 0–0.5)
LDH SERPL-CCNC: 316 U/L (ref 135–214)
LYMPHOCYTES # BLD AUTO: 2.01 10*3/MM3 (ref 0.7–3.1)
LYMPHOCYTES NFR BLD AUTO: 37.5 % (ref 19.6–45.3)
MCH RBC QN AUTO: 29.9 PG (ref 26.6–33)
MCHC RBC AUTO-ENTMCNC: 33.7 G/DL (ref 31.5–35.7)
MCV RBC AUTO: 88.8 FL (ref 79–97)
MONOCYTES # BLD AUTO: 0.68 10*3/MM3 (ref 0.1–0.9)
MONOCYTES NFR BLD AUTO: 12.7 % (ref 5–12)
NEUTROPHILS NFR BLD AUTO: 2.32 10*3/MM3 (ref 1.7–7)
NEUTROPHILS NFR BLD AUTO: 43.3 % (ref 42.7–76)
NRBC BLD AUTO-RTO: 0 /100 WBC (ref 0–0.2)
PLATELET # BLD AUTO: 223 10*3/MM3 (ref 140–450)
PMV BLD AUTO: 10 FL (ref 6–12)
POTASSIUM SERPL-SCNC: 5.1 MMOL/L (ref 3.5–5.2)
PROT SERPL-MCNC: 7 G/DL (ref 6–8.5)
RBC # BLD AUTO: 4.38 10*6/MM3 (ref 3.77–5.28)
SODIUM SERPL-SCNC: 136 MMOL/L (ref 136–145)
T4 FREE SERPL-MCNC: 1.61 NG/DL (ref 0.92–1.68)
TSH SERPL DL<=0.05 MIU/L-ACNC: 0.32 UIU/ML (ref 0.27–4.2)
WBC NRBC COR # BLD AUTO: 5.36 10*3/MM3 (ref 3.4–10.8)

## 2024-07-02 PROCEDURE — 1159F MED LIST DOCD IN RCRD: CPT | Performed by: INTERNAL MEDICINE

## 2024-07-02 PROCEDURE — 1126F AMNT PAIN NOTED NONE PRSNT: CPT | Performed by: INTERNAL MEDICINE

## 2024-07-02 PROCEDURE — 99214 OFFICE O/P EST MOD 30 MIN: CPT | Performed by: INTERNAL MEDICINE

## 2024-07-02 PROCEDURE — 84439 ASSAY OF FREE THYROXINE: CPT | Performed by: INTERNAL MEDICINE

## 2024-07-02 PROCEDURE — 80053 COMPREHEN METABOLIC PANEL: CPT

## 2024-07-02 PROCEDURE — 85025 COMPLETE CBC W/AUTO DIFF WBC: CPT

## 2024-07-02 PROCEDURE — 83615 LACTATE (LD) (LDH) ENZYME: CPT

## 2024-07-02 PROCEDURE — 1160F RVW MEDS BY RX/DR IN RCRD: CPT | Performed by: INTERNAL MEDICINE

## 2024-07-02 PROCEDURE — 84443 ASSAY THYROID STIM HORMONE: CPT | Performed by: INTERNAL MEDICINE

## 2024-07-02 PROCEDURE — 36415 COLL VENOUS BLD VENIPUNCTURE: CPT

## 2024-07-03 ENCOUNTER — TELEPHONE (OUTPATIENT)
Dept: ONCOLOGY | Facility: CLINIC | Age: 74
End: 2024-07-03
Payer: MEDICARE

## 2024-08-29 ENCOUNTER — HOSPITAL ENCOUNTER (OUTPATIENT)
Facility: HOSPITAL | Age: 74
Discharge: HOME | End: 2024-08-29
Admitting: STUDENT IN AN ORGANIZED HEALTH CARE EDUCATION/TRAINING PROGRAM
Payer: MEDICARE

## 2024-08-29 ENCOUNTER — OFFICE VISIT (OUTPATIENT)
Dept: OBSTETRICS AND GYNECOLOGY | Age: 74
End: 2024-08-29
Payer: MEDICARE

## 2024-08-29 VITALS
HEIGHT: 62 IN | SYSTOLIC BLOOD PRESSURE: 114 MMHG | DIASTOLIC BLOOD PRESSURE: 60 MMHG | BODY MASS INDEX: 27.6 KG/M2 | WEIGHT: 150 LBS

## 2024-08-29 DIAGNOSIS — Z12.31 SCREENING MAMMOGRAM FOR BREAST CANCER: ICD-10-CM

## 2024-08-29 DIAGNOSIS — Z12.31 VISIT FOR SCREENING MAMMOGRAM: ICD-10-CM

## 2024-08-29 DIAGNOSIS — N95.2 VAGINAL ATROPHY: Primary | ICD-10-CM

## 2024-08-29 DIAGNOSIS — M85.80 OSTEOPENIA, SENILE: ICD-10-CM

## 2024-08-29 PROCEDURE — 77067 SCR MAMMO BI INCL CAD: CPT

## 2024-08-29 PROCEDURE — 77063 BREAST TOMOSYNTHESIS BI: CPT

## 2024-08-29 PROCEDURE — 99213 OFFICE O/P EST LOW 20 MIN: CPT | Performed by: STUDENT IN AN ORGANIZED HEALTH CARE EDUCATION/TRAINING PROGRAM

## 2024-08-30 ENCOUNTER — HOSPITAL ENCOUNTER (OUTPATIENT)
Facility: HOSPITAL | Age: 74
Discharge: HOME | End: 2024-08-30
Payer: MEDICARE

## 2024-08-30 ENCOUNTER — LAB (OUTPATIENT)
Facility: HOSPITAL | Age: 74
End: 2024-08-30
Payer: MEDICARE

## 2024-08-30 DIAGNOSIS — C83.00 SMALL LYMPHOCYTIC LYMPHOMA: ICD-10-CM

## 2024-08-30 LAB
ALBUMIN SERPL-MCNC: 4 G/DL (ref 3.5–5.2)
ALBUMIN/GLOB SERPL: 1.2 G/DL
ALP SERPL-CCNC: 139 U/L (ref 39–117)
ALT SERPL W P-5'-P-CCNC: 20 U/L (ref 1–33)
ANION GAP SERPL CALCULATED.3IONS-SCNC: 9.5 MMOL/L (ref 5–15)
AST SERPL-CCNC: 23 U/L (ref 1–32)
BASOPHILS # BLD AUTO: 0.04 10*3/MM3 (ref 0–0.2)
BASOPHILS NFR BLD AUTO: 0.8 % (ref 0–1.5)
BILIRUB SERPL-MCNC: 0.4 MG/DL (ref 0–1.2)
BUN SERPL-MCNC: 12 MG/DL (ref 8–23)
BUN/CREAT SERPL: 14.8 (ref 7–25)
CALCIUM SPEC-SCNC: 9.9 MG/DL (ref 8.6–10.5)
CHLORIDE SERPL-SCNC: 100 MMOL/L (ref 98–107)
CO2 SERPL-SCNC: 25.5 MMOL/L (ref 22–29)
CREAT SERPL-MCNC: 0.81 MG/DL (ref 0.57–1)
DEPRECATED RDW RBC AUTO: 43.2 FL (ref 37–54)
EGFRCR SERPLBLD CKD-EPI 2021: 76.3 ML/MIN/1.73
EOSINOPHIL # BLD AUTO: 0.32 10*3/MM3 (ref 0–0.4)
EOSINOPHIL NFR BLD AUTO: 6.2 % (ref 0.3–6.2)
ERYTHROCYTE [DISTWIDTH] IN BLOOD BY AUTOMATED COUNT: 13 % (ref 12.3–15.4)
GLOBULIN UR ELPH-MCNC: 3.3 GM/DL
GLUCOSE SERPL-MCNC: 97 MG/DL (ref 65–99)
HCT VFR BLD AUTO: 41.5 % (ref 34–46.6)
HGB BLD-MCNC: 13.5 G/DL (ref 12–15.9)
IMM GRANULOCYTES # BLD AUTO: 0.02 10*3/MM3 (ref 0–0.05)
IMM GRANULOCYTES NFR BLD AUTO: 0.4 % (ref 0–0.5)
LDH SERPL-CCNC: 273 U/L (ref 135–214)
LYMPHOCYTES # BLD AUTO: 1.72 10*3/MM3 (ref 0.7–3.1)
LYMPHOCYTES NFR BLD AUTO: 33.3 % (ref 19.6–45.3)
MCH RBC QN AUTO: 29.7 PG (ref 26.6–33)
MCHC RBC AUTO-ENTMCNC: 32.5 G/DL (ref 31.5–35.7)
MCV RBC AUTO: 91.4 FL (ref 79–97)
MONOCYTES # BLD AUTO: 0.77 10*3/MM3 (ref 0.1–0.9)
MONOCYTES NFR BLD AUTO: 14.9 % (ref 5–12)
NEUTROPHILS NFR BLD AUTO: 2.29 10*3/MM3 (ref 1.7–7)
NEUTROPHILS NFR BLD AUTO: 44.4 % (ref 42.7–76)
NRBC BLD AUTO-RTO: 0 /100 WBC (ref 0–0.2)
PLATELET # BLD AUTO: 196 10*3/MM3 (ref 140–450)
PMV BLD AUTO: 10.9 FL (ref 6–12)
POTASSIUM SERPL-SCNC: 4.8 MMOL/L (ref 3.5–5.2)
PROT SERPL-MCNC: 7.3 G/DL (ref 6–8.5)
RBC # BLD AUTO: 4.54 10*6/MM3 (ref 3.77–5.28)
SODIUM SERPL-SCNC: 135 MMOL/L (ref 136–145)
WBC NRBC COR # BLD AUTO: 5.16 10*3/MM3 (ref 3.4–10.8)

## 2024-08-30 PROCEDURE — 80053 COMPREHEN METABOLIC PANEL: CPT

## 2024-08-30 PROCEDURE — 70491 CT SOFT TISSUE NECK W/DYE: CPT

## 2024-08-30 PROCEDURE — 25510000001 IOPAMIDOL 61 % SOLUTION: Performed by: INTERNAL MEDICINE

## 2024-08-30 PROCEDURE — 0 DIATRIZOATE MEGLUMINE & SODIUM PER 1 ML: Performed by: INTERNAL MEDICINE

## 2024-08-30 PROCEDURE — 71260 CT THORAX DX C+: CPT

## 2024-08-30 PROCEDURE — 83615 LACTATE (LD) (LDH) ENZYME: CPT | Performed by: INTERNAL MEDICINE

## 2024-08-30 PROCEDURE — 74177 CT ABD & PELVIS W/CONTRAST: CPT

## 2024-08-30 PROCEDURE — 85025 COMPLETE CBC W/AUTO DIFF WBC: CPT | Performed by: INTERNAL MEDICINE

## 2024-08-30 RX ORDER — IOPAMIDOL 612 MG/ML
100 INJECTION, SOLUTION INTRAVASCULAR
Status: COMPLETED | OUTPATIENT
Start: 2024-08-30 | End: 2024-08-30

## 2024-08-30 RX ADMIN — DIATRIZOATE MEGLUMINE AND DIATRIZOATE SODIUM 30 ML: 600; 100 SOLUTION ORAL; RECTAL at 09:05

## 2024-08-30 RX ADMIN — IOPAMIDOL 85 ML: 612 INJECTION, SOLUTION INTRAVENOUS at 10:35

## 2024-09-06 ENCOUNTER — APPOINTMENT (OUTPATIENT)
Dept: LAB | Facility: HOSPITAL | Age: 74
End: 2024-09-06
Payer: MEDICARE

## 2024-09-06 ENCOUNTER — OFFICE VISIT (OUTPATIENT)
Dept: ONCOLOGY | Facility: CLINIC | Age: 74
End: 2024-09-06
Payer: MEDICARE

## 2024-09-06 VITALS
HEIGHT: 62 IN | BODY MASS INDEX: 27.6 KG/M2 | OXYGEN SATURATION: 96 % | DIASTOLIC BLOOD PRESSURE: 77 MMHG | RESPIRATION RATE: 18 BRPM | HEART RATE: 71 BPM | WEIGHT: 150 LBS | SYSTOLIC BLOOD PRESSURE: 151 MMHG

## 2024-09-06 DIAGNOSIS — C83.00 SMALL LYMPHOCYTIC LYMPHOMA: ICD-10-CM

## 2024-09-06 DIAGNOSIS — R93.89 ABNORMAL CT SCAN, CHEST: Primary | ICD-10-CM

## 2024-09-06 LAB
CRP SERPL-MCNC: <0.3 MG/DL (ref 0–0.5)
ERYTHROCYTE [SEDIMENTATION RATE] IN BLOOD: 22 MM/HR (ref 0–30)

## 2024-09-06 PROCEDURE — 85652 RBC SED RATE AUTOMATED: CPT | Performed by: INTERNAL MEDICINE

## 2024-09-06 PROCEDURE — 36415 COLL VENOUS BLD VENIPUNCTURE: CPT | Performed by: INTERNAL MEDICINE

## 2024-09-06 PROCEDURE — 99214 OFFICE O/P EST MOD 30 MIN: CPT | Performed by: INTERNAL MEDICINE

## 2024-09-06 PROCEDURE — 1126F AMNT PAIN NOTED NONE PRSNT: CPT | Performed by: INTERNAL MEDICINE

## 2024-09-06 PROCEDURE — 1159F MED LIST DOCD IN RCRD: CPT | Performed by: INTERNAL MEDICINE

## 2024-09-06 PROCEDURE — 86140 C-REACTIVE PROTEIN: CPT | Performed by: INTERNAL MEDICINE

## 2024-09-06 PROCEDURE — 1160F RVW MEDS BY RX/DR IN RCRD: CPT | Performed by: INTERNAL MEDICINE

## 2024-09-06 RX ORDER — LORAZEPAM 0.5 MG/1
1 TABLET ORAL 3 TIMES DAILY PRN
COMMUNITY

## 2024-09-09 LAB
CENTROMERE B AB SER-ACNC: <0.2 AI (ref 0–0.9)
CHROMATIN AB SERPL-ACNC: <0.2 AI (ref 0–0.9)
DSDNA AB SER-ACNC: 1 IU/ML (ref 0–9)
ENA JO1 AB SER-ACNC: <0.2 AI (ref 0–0.9)
ENA RNP AB SER-ACNC: <0.2 AI (ref 0–0.9)
ENA SCL70 AB SER-ACNC: <0.2 AI (ref 0–0.9)
ENA SM AB SER-ACNC: <0.2 AI (ref 0–0.9)
ENA SS-A AB SER-ACNC: <0.2 AI (ref 0–0.9)
ENA SS-B AB SER-ACNC: <0.2 AI (ref 0–0.9)
Lab: NORMAL

## 2024-09-10 LAB
C-ANCA TITR SER IF: NORMAL TITER
MYELOPEROXIDASE AB SER IA-ACNC: <0.2 UNITS (ref 0–0.9)
P-ANCA ATYPICAL TITR SER IF: NORMAL TITER
P-ANCA TITR SER IF: NORMAL TITER
PROTEINASE3 AB SER IA-ACNC: <0.2 UNITS (ref 0–0.9)

## 2024-09-20 ENCOUNTER — LAB (OUTPATIENT)
Dept: LAB | Facility: HOSPITAL | Age: 74
End: 2024-09-20
Payer: MEDICARE

## 2024-09-20 ENCOUNTER — TRANSCRIBE ORDERS (OUTPATIENT)
Dept: ADMINISTRATIVE | Facility: HOSPITAL | Age: 74
End: 2024-09-20
Payer: MEDICARE

## 2024-09-20 DIAGNOSIS — R93.89 ABNORMAL RADIOLOGICAL FINDINGS IN SKIN AND SUBCUTANEOUS TISSUE: ICD-10-CM

## 2024-09-20 DIAGNOSIS — R93.89 ABNORMAL RADIOLOGICAL FINDINGS IN SKIN AND SUBCUTANEOUS TISSUE: Primary | ICD-10-CM

## 2024-09-20 PROCEDURE — 87449 NOS EACH ORGANISM AG IA: CPT

## 2024-09-20 PROCEDURE — 86331 IMMUNODIFFUSION OUCHTERLONY: CPT

## 2024-09-20 PROCEDURE — 86671 FUNGUS NES ANTIBODY: CPT

## 2024-09-20 PROCEDURE — 86602 ANTINOMYCES ANTIBODY: CPT

## 2024-09-20 PROCEDURE — 86606 ASPERGILLUS ANTIBODY: CPT

## 2024-09-20 PROCEDURE — 86609 BACTERIUM ANTIBODY: CPT

## 2024-09-20 PROCEDURE — 36415 COLL VENOUS BLD VENIPUNCTURE: CPT

## 2024-09-23 ENCOUNTER — TELEPHONE (OUTPATIENT)
Dept: ONCOLOGY | Facility: CLINIC | Age: 74
End: 2024-09-23
Payer: MEDICARE

## 2024-09-25 LAB
A FUMIGATUS IGG SER QL: NEGATIVE
A PULLULANS IGG SER QL: NEGATIVE
LACEYELLA SACCHARI AB SER QL ID: NEGATIVE
PIGEON SERUM IGG QL: NEGATIVE
S RECTIVIRGULA IGG SER QL ID: NEGATIVE
T VULGARIS IGG SER QL: NEGATIVE

## 2024-09-26 LAB
DPYD GENE MUT ANL BLD/T: NEGATIVE
FUNGITELL VALUE: <31.25 PG/ML
IMP & REVIEW OF LAB RESULTS: NORMAL
Lab: NORMAL
Lab: NORMAL
PATHOLOGIST NAME: NORMAL
REFERENCE VALUE: NORMAL

## 2024-09-27 ENCOUNTER — TRANSCRIBE ORDERS (OUTPATIENT)
Dept: ADMINISTRATIVE | Facility: HOSPITAL | Age: 74
End: 2024-09-27
Payer: MEDICARE

## 2024-09-27 DIAGNOSIS — J84.113 IDIOPATHIC NON-SPECIFIC INTERSTITIAL PNEUMONITIS: Primary | ICD-10-CM

## 2024-09-30 ENCOUNTER — TELEPHONE (OUTPATIENT)
Dept: ONCOLOGY | Facility: CLINIC | Age: 74
End: 2024-09-30

## 2024-09-30 NOTE — TELEPHONE ENCOUNTER
Caller: Imani Shankar    Relationship: Self    Best call back number: [phone]    What is the best time to reach you: ANYTIME    Who are you requesting to speak with (clinical staff, provider,  specific staff member): CLINICAL    What was the call regarding: PT IS WANTING TO SEE IF A LETTER CAN BE MAILED TO HER STATING HER DIAGNOSIS WITH DATE

## 2024-10-03 ENCOUNTER — HOSPITAL ENCOUNTER (OUTPATIENT)
Dept: CT IMAGING | Facility: HOSPITAL | Age: 74
Discharge: HOME | End: 2024-10-03
Admitting: STUDENT IN AN ORGANIZED HEALTH CARE EDUCATION/TRAINING PROGRAM
Payer: MEDICARE

## 2024-10-03 DIAGNOSIS — J84.113 IDIOPATHIC NON-SPECIFIC INTERSTITIAL PNEUMONITIS: ICD-10-CM

## 2024-10-03 PROCEDURE — 71250 CT THORAX DX C-: CPT

## 2024-10-28 ENCOUNTER — OFFICE VISIT (OUTPATIENT)
Age: 74
End: 2024-10-28
Payer: MEDICARE

## 2024-10-28 VITALS
HEIGHT: 62 IN | TEMPERATURE: 98 F | HEART RATE: 74 BPM | RESPIRATION RATE: 14 BRPM | SYSTOLIC BLOOD PRESSURE: 124 MMHG | WEIGHT: 153 LBS | OXYGEN SATURATION: 98 % | DIASTOLIC BLOOD PRESSURE: 76 MMHG | BODY MASS INDEX: 28.16 KG/M2

## 2024-10-28 DIAGNOSIS — M54.50 ACUTE BILATERAL LOW BACK PAIN, UNSPECIFIED WHETHER SCIATICA PRESENT: Primary | ICD-10-CM

## 2024-10-28 LAB
BILIRUB BLD-MCNC: NEGATIVE MG/DL
CLARITY, POC: CLEAR
COLOR UR: YELLOW
EXPIRATION DATE: ABNORMAL
GLUCOSE UR STRIP-MCNC: NEGATIVE MG/DL
KETONES UR QL: NEGATIVE
LEUKOCYTE EST, POC: NEGATIVE
Lab: ABNORMAL
NITRITE UR-MCNC: NEGATIVE MG/ML
PH UR: 5.5 [PH] (ref 5–8)
PROT UR STRIP-MCNC: NEGATIVE MG/DL
RBC # UR STRIP: ABNORMAL /UL
SP GR UR: 1.01 (ref 1–1.03)
UROBILINOGEN UR QL: ABNORMAL

## 2024-10-28 PROCEDURE — 1159F MED LIST DOCD IN RCRD: CPT | Performed by: FAMILY MEDICINE

## 2024-10-28 PROCEDURE — 81003 URINALYSIS AUTO W/O SCOPE: CPT | Performed by: FAMILY MEDICINE

## 2024-10-28 PROCEDURE — 1125F AMNT PAIN NOTED PAIN PRSNT: CPT | Performed by: FAMILY MEDICINE

## 2024-10-28 PROCEDURE — 1160F RVW MEDS BY RX/DR IN RCRD: CPT | Performed by: FAMILY MEDICINE

## 2024-10-28 PROCEDURE — 99203 OFFICE O/P NEW LOW 30 MIN: CPT | Performed by: FAMILY MEDICINE

## 2024-10-28 RX ORDER — HYDROCODONE BITARTRATE AND ACETAMINOPHEN 5; 325 MG/1; MG/1
1 TABLET ORAL EVERY 6 HOURS PRN
Qty: 18 TABLET | Refills: 0 | Status: SHIPPED | OUTPATIENT
Start: 2024-10-28

## 2024-10-28 RX ORDER — TRAMADOL HYDROCHLORIDE 50 MG/1
50 TABLET ORAL EVERY 6 HOURS PRN
Qty: 120 TABLET | Refills: 3 | Status: SHIPPED | OUTPATIENT
Start: 2024-10-28

## 2024-10-28 RX ORDER — PREDNISONE 10 MG/1
TABLET ORAL
Qty: 30 TABLET | Refills: 0 | Status: SHIPPED | OUTPATIENT
Start: 2024-10-28

## 2024-10-28 NOTE — PROGRESS NOTES
"Chief Complaint  Back Pain (Back pain for the last three days.)    Subjective        Imani Shankar presents to [facility]  Back Pain      Objective the patient comes in with complaints of low back pain the pain started approximately 3 days ago when he noted that his her pain level is at a 7 or 8.  The patient has taken tramadol diclofenac and Robaxin without any appreciable help.  The the pain started in her right low back and radiated into her right abdomen and then isolated itself into the right low back.  Vital Signs:  /76 (BP Location: Left arm, Patient Position: Sitting, Cuff Size: Adult)   Pulse 74   Temp 98 °F (36.7 °C) (Temporal)   Resp 14   Ht 157.5 cm (62.01\")   Wt 69.4 kg (153 lb)   SpO2 98%   BMI 27.98 kg/m²   Estimated body mass index is 27.98 kg/m² as calculated from the following:    Height as of this encounter: 157.5 cm (62.01\").    Weight as of this encounter: 69.4 kg (153 lb).    BMI is >= 25 and <30. (Overweight) The following options were offered after discussion;: weight loss educational material (shared in after visit summary), exercise counseling/recommendations, and nutrition counseling/recommendations      Physical Exam  Constitutional:       General: She is not in acute distress.     Appearance: Normal appearance. She is not ill-appearing, toxic-appearing or diaphoretic.   HENT:      Head: Normocephalic and atraumatic.      Right Ear: There is no impacted cerumen.      Left Ear: There is no impacted cerumen.      Nose: No congestion or rhinorrhea.      Mouth/Throat:      Pharynx: Oropharynx is clear. No oropharyngeal exudate or posterior oropharyngeal erythema.   Eyes:      General: No scleral icterus.        Right eye: No discharge.         Left eye: No discharge.      Extraocular Movements: Extraocular movements intact.      Conjunctiva/sclera: Conjunctivae normal.      Pupils: Pupils are equal, round, and reactive to light.   Cardiovascular:      " Rate and Rhythm: Normal rate and regular rhythm.      Pulses: Normal pulses.      Heart sounds: Normal heart sounds.   Pulmonary:      Effort: Pulmonary effort is normal.      Breath sounds: Normal breath sounds.   Abdominal:      General: Abdomen is flat. Bowel sounds are normal.      Palpations: Abdomen is soft.   Musculoskeletal:         General: Normal range of motion.      Cervical back: Normal range of motion and neck supple.      Lumbar back: Spasms, tenderness and bony tenderness present. Decreased range of motion.   Skin:     General: Skin is warm.   Neurological:      General: No focal deficit present.      Mental Status: She is alert and oriented to person, place, and time. Mental status is at baseline.   Psychiatric:         Mood and Affect: Mood normal.         Behavior: Behavior normal.         Thought Content: Thought content normal.         Judgment: Judgment normal.        Result Review :                Assessment and Plan   Diagnoses and all orders for this visit:    1. Acute bilateral low back pain, unspecified whether sciatica present (Primary)  -     POCT urinalysis dipstick, automated  -     traMADol (ULTRAM) 50 MG tablet; Take 1 tablet by mouth Every 6 (Six) Hours As Needed for Moderate Pain.  Dispense: 120 tablet; Refill: 3  -     HYDROcodone-acetaminophen (NORCO) 5-325 MG per tablet; Take 1 tablet by mouth Every 6 (Six) Hours As Needed for Severe Pain.  Dispense: 18 tablet; Refill: 0  -     predniSONE (DELTASONE) 10 MG tablet; 5pox2d/4pox2d/3pox2d/2pox2d/1pox2d with food.  Dispense: 30 tablet; Refill: 0             Follow Up   Return in about 3 years (around 10/28/2027).  Patient was given instructions and counseling regarding her condition or for health maintenance advice. Please see specific information pulled into the AVS if appropriate.

## 2024-10-29 ENCOUNTER — TELEPHONE (OUTPATIENT)
Dept: ONCOLOGY | Facility: CLINIC | Age: 74
End: 2024-10-29
Payer: MEDICARE

## 2024-10-29 NOTE — TELEPHONE ENCOUNTER
Returned call to patient. She stated she needs a simple statement from Dr. Cleveland stating she has lymphoma to be able to submit with some cancer/insurance paperwork. Advised patient we would get this letter prepared and will mail it to her home address. Patient v/u.

## 2024-10-29 NOTE — TELEPHONE ENCOUNTER
Provider: Cristofer  Caller: patient  Relationship to Patient: self  Call Back Phone Number: [phone]  Reason for Call: patient needs to get a letter stating that she has lymphoma

## 2024-11-01 ENCOUNTER — HOSPITAL ENCOUNTER (EMERGENCY)
Facility: HOSPITAL | Age: 74
Discharge: HOME | End: 2024-11-02
Attending: EMERGENCY MEDICINE
Payer: MEDICARE

## 2024-11-01 ENCOUNTER — APPOINTMENT (OUTPATIENT)
Dept: CT IMAGING | Facility: HOSPITAL | Age: 74
End: 2024-11-01
Payer: MEDICARE

## 2024-11-01 VITALS
SYSTOLIC BLOOD PRESSURE: 162 MMHG | WEIGHT: 148 LBS | RESPIRATION RATE: 18 BRPM | TEMPERATURE: 97.4 F | HEART RATE: 96 BPM | HEIGHT: 61 IN | BODY MASS INDEX: 27.94 KG/M2 | OXYGEN SATURATION: 96 % | DIASTOLIC BLOOD PRESSURE: 95 MMHG

## 2024-11-01 DIAGNOSIS — S00.83XA FACIAL CONTUSION, INITIAL ENCOUNTER: ICD-10-CM

## 2024-11-01 DIAGNOSIS — S02.2XXA CLOSED FRACTURE OF NASAL BONE, INITIAL ENCOUNTER: Primary | ICD-10-CM

## 2024-11-01 PROCEDURE — 99284 EMERGENCY DEPT VISIT MOD MDM: CPT

## 2024-11-01 PROCEDURE — 70486 CT MAXILLOFACIAL W/O DYE: CPT

## 2024-11-01 PROCEDURE — 72125 CT NECK SPINE W/O DYE: CPT

## 2024-11-01 PROCEDURE — 70450 CT HEAD/BRAIN W/O DYE: CPT

## 2024-11-02 RX ORDER — IBUPROFEN 800 MG/1
800 TABLET, FILM COATED ORAL ONCE
Status: COMPLETED | OUTPATIENT
Start: 2024-11-02 | End: 2024-11-02

## 2024-11-02 RX ADMIN — IBUPROFEN 800 MG: 800 TABLET, FILM COATED ORAL at 00:47

## 2024-11-02 NOTE — ED PROVIDER NOTES
EMERGENCY DEPARTMENT ENCOUNTER    Room Number:  HB1/C  PCP: Chang Carter MD  Historian: Patient      HPI:  Chief Complaint: Fall facial injury  A complete HPI/ROS/PMH/PSH/SH/FH are unobtainable due to: None  Context: Imani Shankar is a 74 y.o. female who presents to the ED c/o fall facial injury.  Patient was at [employer].  Patient tripped and fell.  Patient hit her head.  Patient did not lose consciousness.  Has had no seizures.  Patient complaining of headache.  Not on blood thinners.  Tetanus up-to-date.            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    Chronic night sweats 05/07/2024     Resolved Ambulatory Problems     Diagnosis Date Noted    Acute gastric ulcer with hemorrhage 12/21/2018    Encounter for screening for malignant neoplasm of colon 10/13/2022     Past Medical History:   Diagnosis Date    Arthritis     Bleeding ulcer 2018    Cancer 01/2012    Cataract     Chronic low back pain     Disease of thyroid gland     Duodenal ulcer     GERD (gastroesophageal reflux disease)     GI (gastrointestinal bleed) 02/02/2018    High cholesterol     History of cholangiocarcinoma     Hypertension     Peripheral neuropathy          PAST SURGICAL HISTORY  Past Surgical History:   Procedure Laterality Date    APPENDECTOMY      CATARACT EXTRACTION      CHOLECYSTECTOMY  01/2012    for gall bladder cancer, removed by Dr. Kendy Martinez    COLONOSCOPY  approx 2013    Too YUEN,  normal per patient    COLONOSCOPY N/A 11/29/2022    Procedure: COLONOSCOPY to cecum and  TI:  bx polyps, cold snare polyp;  Surgeon: Janet Hamilton MD;  Location:  [facility];  Service: Gastroenterology;  Laterality: N/A;  pre:  screening  post:  polyps, divertidulosis, hemorrhoids    ENDOSCOPY N/A 4/4/2019    Procedure: ESOPHAGOGASTRODUODENOSCOPY with biopsy;  Surgeon: Ruby Ferrari MD;  Location:  [facility];  Service: Gastroenterology    SHOULDER ARTHROSCOPY W/ ROTATOR CUFF REPAIR Right 9/28/2017    Procedure: RT SHOULDER ARTHROSCOPY ACROMIOPLASTY ROTATOR CUFF REPAIR AND LABRAL DEBRIDEMENT;  Surgeon: Nicho Hill MD;  Location:  [facility];  Service:     UPPER GASTROINTESTINAL ENDOSCOPY  02/02/2018    [facility]    multipal duodenal ulcers in D1-D2 area, no active bleed    UPPER GASTROINTESTINAL ENDOSCOPY  01/31/2018    [facility]   oozing blood of small ulcer in D1-D2 region, clot overlying ulcer, injected at base, endo clips placed    UPPER GASTROINTESTINAL ENDOSCOPY  03/14/2018    [facility]   previous ulcer almost healed    US GUIDED LYMPH NODE BIOPSY  5/2/2024         FAMILY HISTORY  Family History   Problem Relation Age of Onset    Ulcers Mother     Kidney failure Father     Hypertension Father     Hypertension Brother     No Known Problems Son     No Known Problems Son     Breast cancer Neg Hx     Ovarian cancer Neg Hx     Uterine cancer Neg Hx     Colon cancer Neg Hx     Malig Hyperthermia Neg Hx          SOCIAL HISTORY  Social History     Socioeconomic History    Marital status:      Spouse name: TRACEY    Number of children: 4   Tobacco Use    Smoking status: Never     Passive exposure: Never    Smokeless tobacco: Never   Vaping Use    Vaping status: Never Used   Substance and Sexual Activity    Alcohol use: No    Drug use: No    Sexual activity: Yes     Partners: Male     Birth control/protection: Post-menopausal     Comment: spouse = TRACEY          ALLERGIES  Patient has no known allergies.        REVIEW OF SYSTEMS  Review of  Systems   Fall facial injury      PHYSICAL EXAM  ED Triage Vitals   Temp Heart Rate Resp BP SpO2   11/01/24 2034 11/01/24 2034 11/01/24 2034 11/01/24 2043 11/01/24 2034   97.4 °F (36.3 °C) 96 18 162/95 96 %      Temp src Heart Rate Source Patient Position BP Location FiO2 (%)   11/01/24 2034 11/01/24 2034 11/01/24 2043 11/01/24 2043 --   Tympanic Monitor Sitting Left arm        Physical Exam      GENERAL: no acute distress  HENT: nares patent.  Tenderness to forehead with abrasion  EYES: no scleral icterus  CV: regular rhythm, normal rate  RESPIRATORY: normal effort  ABDOMEN: soft  MUSCULOSKELETAL: no deformity  NEURO: alert, moves all extremities, follows commands  PSYCH:  calm, cooperative  SKIN: warm, dry    Vital signs and nursing notes reviewed.          LAB RESULTS  No results found for this or any previous visit (from the past 24 hours).            RADIOLOGY  CT Head Without Contrast, CT Cervical Spine Without Contrast, CT Facial Bones Without Contrast    Result Date: 11/2/2024   CT HEAD WITHOUT CONTRAST, MAXILLOFACIAL CT WITHOUT CONTRAST, AND CERVICAL SPINE CT WITHOUT CONTRAST  CLINICAL HISTORY: Fall with facial injury. Hematoma over right eye.  TECHNIQUE: CT scan of the head was obtained with 3 mm axial soft tissue algorithm and 2 mm bone algorithm images. No intravenous contrast was administered. Sagittal and coronal reconstructions were obtained.  COMPARISON: CTA head dated 4/2/2019.  FINDINGS:   There is no evidence of an acute calvarial fracture or acute extra-axial hemorrhage.  Again noted is a densely calcified meningioma arising from the posterior aspect of the falx cerebri and exerting mild mass effect upon the medial portion of the right occipital lobe. The lesion measures up to approximately 1.5 x 1.2 cm in greatest axial dimensions and has a generally similar appearance when compared to the prior CTA of the head dated 4/2/2019  The ventricles, sulci, and cisterns are age-appropriate are  age-appropriate. The gray-white matter differentiation is within normal limits. The basal ganglia and thalami are unremarkable. The posterior fossa structures are within normal limits.  Incidental note is made of a prominent size anterior frontal scalp hematoma.       No evidence for acute traumatic intracranial pathology.  Again noted are findings most compatible with a densely calcified meningioma arising from the right aspect of the falx cerebri exerting mild mass effect upon the medial portion of the right occipital lobe. This finding demonstrates no significant interval change when compared to a CTA of the head dated 4/2/2019.   TECHNIQUE: CT scan of the maxillofacial region was obtained with 1 mm axial, sagittal, and coronal bone algorithm images. Additionally, there are 2 mm axial soft tissue algorithm images.  FINDINGS:  There is an anterior frontal convexity scalp hematoma extending to the right preseptal soft tissues. The orbits are otherwise unremarkable. There are bilateral mildly displaced nasal bone fractures.  IMPRESSION:  Bilateral mildly displaced nasal bone fractures.   TECHNIQUE: CT scan of the cervical spine was obtained with 1 mm axial bone algorithm and 2 mm axial soft tissue algorithm images. Sagittal and coronal reconstructed images were obtained.  FINDINGS:  There is no evidence for acute fracture or bony malalignment involving the cervical spine.  Incidental degenerative phenomena are appreciated within the cervical spine. There are advanced degenerative disc changes noted at C4-5, C5-6, and C6-7. Degenerative anterior spondylolisthesis of C3 on C4 by approximately 3 mm is noted. Disc osteophyte complexes are seen primarily at C4-5, C5-6, and C6-7 resulting in up to moderate degrees of canal narrowing at C4-5 and C5-6. Foraminal stenotic changes are most prominently seen from C3-4 down to C6-7 secondary to primarily uncovertebral joint hypertrophy.  Incidental note is made of an ectatic  appearance of the innominate artery measuring up to 17 mm in diameter.  IMPRESSION:  No evidence for acute fracture or bony malalignment involving the cervical spine.  Incidental degenerative phenomena as discussed above.  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   Radiation dose reduction techniques were utilized, including automated exposure control and exposure modulation based on body size.  This report was finalized on 11/2/2024 12:38 AM by Dr. Javad Tenorio M.D on Workstation: [id number]       Ordered the above noted radiological studies. CT head independently interpreted by me and shows no bleeding          PROCEDURES  Procedures            MEDICATIONS GIVEN IN ER  Medications   ibuprofen (ADVIL,MOTRIN) tablet 800 mg (800 mg Oral Given 11/2/24 0047)                   MEDICAL DECISION MAKING, PROGRESS, and CONSULTS    All labs have been independently reviewed by me.  All radiology studies have been reviewed by me and I have also reviewed the radiology report.   EKG's independently viewed and interpreted by me.  Discussion below represents my analysis of pertinent findings related to patient's condition, differential diagnosis, treatment plan and final disposition.      Additional sources:  - Discussed/ obtained information from independent historians:  none    - External (non-ED) record review: Epic reviewed and patient seen for back pain by PMD 10/28    - Chronic or social conditions impacting care: none    - Shared decision making:  none      Orders placed during this visit:  Orders Placed This Encounter   Procedures    CT Head Without Contrast    CT Cervical Spine Without Contrast    CT Facial Bones Without Contrast         Additional orders considered but not ordered:  none        Differential diagnosis includes but is not limited to:    Contusion vs fracture vs ICH      Independent interpretation of labs, radiology studies, and discussions with consultants:  ED  Course as of 11/02/24 0808   Sat Nov 02, 2024   0040 Discussed CT Scans with Dr. Tenorio, Radiologist, bilateral nasal bone fractures but otherwise no acute emergent findings [DC]      ED Course User Index  [DC] Danny Aldrich MD               DIAGNOSIS  Final diagnoses:   Closed fracture of nasal bone, initial encounter   Facial contusion, initial encounter         DISPOSITION  discharge            Latest Documented Vital Signs:  As of 08:08 EDT  BP- 162/95 HR- 96 Temp- 97.4 °F (36.3 °C) (Tympanic) O2 sat- 96%              --    Please note that portions of this were completed with a voice recognition program.       Note Disclaimer: At [facility], we believe that sharing information builds trust and better relationships. You are receiving this note because you are receiving care at [facility] or recently visited. It is possible you will see health information before a provider has talked with you about it. This kind of information can be easy to misunderstand. To help you fully understand what it means for your health, we urge you to discuss this note with your provider.            Royce Queen MD  11/02/24 0811

## 2024-11-02 NOTE — DISCHARGE INSTRUCTIONS
Ice for pain or swelling, Tylenol and ibuprofen for pain, outpatient ENT follow-up as needed for your nasal bone fractures, PCP follow-up as needed, ED return for worsening symptoms as needed.  Expect to be stiff and sore for several days up to 1 to 2 weeks.

## 2024-11-11 DIAGNOSIS — Z76.0 ISSUE OF REPEAT PRESCRIPTION: Primary | ICD-10-CM

## 2024-11-12 RX ORDER — PRAVASTATIN SODIUM 40 MG
40 TABLET ORAL DAILY
Qty: 90 TABLET | Refills: 3 | Status: SHIPPED | OUTPATIENT
Start: 2024-11-12

## 2024-11-18 ENCOUNTER — OFFICE VISIT (OUTPATIENT)
Age: 74
End: 2024-11-18
Payer: MEDICARE

## 2024-11-18 VITALS
RESPIRATION RATE: 18 BRPM | HEIGHT: 61 IN | OXYGEN SATURATION: 97 % | WEIGHT: 154 LBS | BODY MASS INDEX: 29.07 KG/M2 | SYSTOLIC BLOOD PRESSURE: 154 MMHG | DIASTOLIC BLOOD PRESSURE: 96 MMHG | HEART RATE: 68 BPM | TEMPERATURE: 96.6 F

## 2024-11-18 DIAGNOSIS — M54.16 BILATERAL LUMBAR RADICULOPATHY: Primary | ICD-10-CM

## 2024-11-18 PROCEDURE — 99213 OFFICE O/P EST LOW 20 MIN: CPT

## 2024-11-18 PROCEDURE — 1125F AMNT PAIN NOTED PAIN PRSNT: CPT

## 2024-11-18 NOTE — PROGRESS NOTES
"Chief Complaint  Back Pain (Pt states she fell on 11/1/24, pt had scans done. Now complains of lower back and leg pain)    Subjective        Imani Shankar presents to [facility]  Back Pain        History of Present Illness  The patient presents for evaluation of back pain.    She experienced a fall on 11/01/2024, landing face down. She is uncertain if she attempted to break her fall with her arms, but she did not lose consciousness. Following the incident, she was taken to the ER by her son where she underwent scans. She reports severe pain, which is a new experience for her. The pain is so intense that it hinders her ability to sit, bend, or lift objects. She describes the pain as a stabbing sensation, akin to an electric shock, and particularly severe when she sits. She also experiences a squeezing pain, which subsides when she stands. Interestingly, she does not experience pain while walking. The pain does not disturb her sleep, but she finds it difficult to get up and experiences discomfort when she turns in bed. The calf pain began this past Thursday.    Prior to the fall, she had been experiencing pain in her right hip and had consulted Dr. Finnegan on 10/28/2024. She describes the current pain as different from the previous one, starting in her back and radiating to her calf.    She has been managing the pain with Lortab, tramadol, and ibuprofen. She also sustained an injury to her knee during the fall, but the scab has since healed.       Objective   Vital Signs:  /96 (BP Location: Right arm, Patient Position: Sitting, Cuff Size: Adult)   Pulse 68   Temp 96.6 °F (35.9 °C) (Temporal)   Resp 18   Ht 154.9 cm (60.98\")   Wt 69.9 kg (154 lb)   SpO2 97%   BMI 29.11 kg/m²   Estimated body mass index is 29.11 kg/m² as calculated from the following:    Height as of this encounter: 154.9 cm (60.98\").    Weight as of this encounter: 69.9 kg (154 lb).            Review of Systems "   Musculoskeletal:  Positive for back pain.      Physical Exam  Musculoskeletal:      Cervical back: Normal.      Thoracic back: Normal.      Lumbar back: Normal. No swelling, edema, deformity, signs of trauma, lacerations, spasms, tenderness or bony tenderness. Normal range of motion. Negative right straight leg raise test and negative left straight leg raise test. No scoliosis.      Comments: Radicular pain both LE to the calf. No bowel or bladder changes.         Result Review :          Results                Assessment and Plan   Diagnoses and all orders for this visit:    1. Bilateral lumbar radiculopathy (Primary)  -     MRI Lumbar Spine Without Contrast  -     Ambulatory Referral to Physical Therapy for Evaluation & Treatment        Assessment & Plan  1. Back pain.  Despite a normal physical examination, she reports experiencing a lightning-like pain radiating down both legs. An MRI of the lumbar spine will be ordered. She has been advised to seek immediate medical attention at the emergency room if she experiences any changes in bowel or bladder function, such as difficulty urinating or having a bowel movement or incontinence. A referral for physical therapy will also be made. She has been taking Lortab, tramadol, and ibuprofen for pain management.             Follow Up   No follow-ups on file.  Patient was given instructions and counseling regarding her condition or for health maintenance advice. Please see specific information pulled into the AVS if appropriate.         Patient or patient representative verbalized consent for the use of Ambient Listening during the visit with  ALICIA Alvarez for chart documentation. 11/19/2024  14:22 EST       Answers submitted by the patient for this visit:  Primary Reason for Visit (Submitted on 11/17/2024)  What is the primary reason for your visit?: Back Pain

## 2024-11-21 ENCOUNTER — OFFICE VISIT (OUTPATIENT)
Age: 74
End: 2024-11-21
Payer: MEDICARE

## 2024-11-21 VITALS
TEMPERATURE: 97.4 F | OXYGEN SATURATION: 96 % | SYSTOLIC BLOOD PRESSURE: 140 MMHG | RESPIRATION RATE: 18 BRPM | HEART RATE: 73 BPM | DIASTOLIC BLOOD PRESSURE: 86 MMHG | HEIGHT: 61 IN | BODY MASS INDEX: 29.07 KG/M2 | WEIGHT: 154 LBS

## 2024-11-21 DIAGNOSIS — M51.16 INTERVERTEBRAL DISC DISORDER WITH RADICULOPATHY OF LUMBAR REGION: Primary | ICD-10-CM

## 2024-11-21 DIAGNOSIS — C83.00 SMALL LYMPHOCYTIC LYMPHOMA: ICD-10-CM

## 2024-11-21 RX ORDER — OXYCODONE AND ACETAMINOPHEN 10; 325 MG/1; MG/1
1 TABLET ORAL EVERY 6 HOURS PRN
Qty: 28 TABLET | Refills: 0 | Status: SHIPPED | OUTPATIENT
Start: 2024-11-21 | End: 2024-11-28

## 2024-11-21 NOTE — PROGRESS NOTES
"Chief Complaint  Back Pain (Pt is here for her MRI results)    Subjective        Imani Shankar presents to [facility]  History of Present Illness    History of Present Illness  The patient presents for evaluation of back pain.    She is experiencing severe back pain, which has been somewhat alleviated by tramadol. She has exhausted her supply of hydrocodone, which was previously beneficial. She has a history of low-grade lymphoma and is scheduled to see Dr. Cleveland in January 2024. She reports that physical therapy exacerbated her pain yesterday. She is currently awaiting the results of an MRI. She has been using a heating pad at home for relief.       Objective   Vital Signs:  /86 (BP Location: Right arm, Patient Position: Sitting, Cuff Size: Adult)   Pulse 73   Temp 97.4 °F (36.3 °C) (Temporal)   Resp 18   Ht 154.9 cm (60.98\")   Wt 69.9 kg (154 lb)   SpO2 96%   BMI 29.11 kg/m²   Estimated body mass index is 29.11 kg/m² as calculated from the following:    Height as of this encounter: 154.9 cm (60.98\").    Weight as of this encounter: 69.9 kg (154 lb).            Review of Systems   Physical Exam  Musculoskeletal:      Cervical back: Normal.      Thoracic back: Normal.      Lumbar back: Normal. No swelling, edema, deformity, signs of trauma, lacerations, spasms, tenderness or bony tenderness. Normal range of motion. Negative right straight leg raise test and negative left straight leg raise test. No scoliosis.      Comments: Radicular pain both LE to the calf. No bowel or bladder changes.    Neurological:      Deep Tendon Reflexes: Reflexes are normal and symmetric.        Result Review :          Results  Imaging  Acute Modic changes at L1, L2, two, three, all of the lumbar spine.  Multilevel degenerative changes are noted in the mid and lower lumbar spine most significant at L2-L3, reactive marrow edema in the mid and lower lumbar spine.              Assessment and Plan "   Diagnoses and all orders for this visit:    1. Intervertebral disc disorder with radiculopathy of lumbar region (Primary)  -     C-reactive protein  -     Ambulatory Referral to Hospital Pain Management Department  -     Sedimentation Rate  -     Rheumatoid Factor, Quant  -     MARTHA  -     Cyclic Citrul Peptide Antibody, IgG / IgA  -     oxyCODONE-acetaminophen (Percocet)  MG per tablet; Take 1 tablet by mouth Every 6 (Six) Hours As Needed for Moderate Pain for up to 7 days.  Dispense: 28 tablet; Refill: 0  -     Cancel: Hepatic Function Panel; Future  -     CBC & Differential  -     Hepatic Function Panel    2. Small lymphocytic lymphoma  -     Lactate Dehydrogenase      I spent 60 minutes caring for Imani on this date of service. This time includes time spent by me in the following activities:reviewing tests, performing a medically appropriate examination and/or evaluation , counseling and educating the patient/family/caregiver, referring and communicating with other health care professionals , and documenting information in the medical record     Assessment & Plan  1. Back pain.  Significant arthritis is present in her spine, with no acute findings but chronic changes are evident. Acute inflammatory changes are observed at L1, L2, and L3. Reactive marrow edema is noted in the mid and lower lumbar spine. A note will be sent to Dr. Cleveland regarding her exam findings. Inflammatory labs will be ordered for tomorrow. A call will be made to Dr. Mcmahon. She is advised to avoid lifting, pulling, or tugging and to use a walker for ambulation. She is also advised to use a heating pad at home as it provides some relief.  Patient's case was discussed with Dr. Cleveland he does not feel like her symptoms are presenting from an oncology standpoint but if her symptoms persist they will move her appointment up sooner.  Patient's case was discussed with Dr. Mcmahon he is going to see her in his office tomorrow.    2. Low grade  lymphoma.  She will follow up with Dr. Cleveland in January for her low grade lymphoma.    3. Medication management.  She is out of hydrocodone and has been taking tramadol, which provides some relief. A prescription for oxycodone 10/3/2025 1 every 6 hours as needed for pain #28             Follow Up   No follow-ups on file.  Patient was given instructions and counseling regarding her condition or for health maintenance advice. Please see specific information pulled into the AVS if appropriate.         Patient or patient representative verbalized consent for the use of Ambient Listening during the visit with  ALICIA Alvarez for chart documentation. 11/21/2024  15:40 EST

## 2024-12-03 ENCOUNTER — OFFICE VISIT (OUTPATIENT)
Age: 74
End: 2024-12-03
Payer: MEDICARE

## 2024-12-03 ENCOUNTER — HOSPITAL ENCOUNTER (OUTPATIENT)
Facility: HOSPITAL | Age: 74
Setting detail: OBSERVATION
Discharge: HOME | End: 2024-12-04
Attending: EMERGENCY MEDICINE | Admitting: EMERGENCY MEDICINE
Payer: MEDICARE

## 2024-12-03 VITALS
DIASTOLIC BLOOD PRESSURE: 82 MMHG | RESPIRATION RATE: 16 BRPM | HEIGHT: 61 IN | TEMPERATURE: 97 F | OXYGEN SATURATION: 96 % | WEIGHT: 150 LBS | BODY MASS INDEX: 28.32 KG/M2 | SYSTOLIC BLOOD PRESSURE: 124 MMHG | HEART RATE: 84 BPM

## 2024-12-03 DIAGNOSIS — M54.50 ACUTE BILATERAL LOW BACK PAIN, UNSPECIFIED WHETHER SCIATICA PRESENT: ICD-10-CM

## 2024-12-03 DIAGNOSIS — M48.061 SPINAL STENOSIS OF LUMBAR REGION WITHOUT NEUROGENIC CLAUDICATION: Primary | ICD-10-CM

## 2024-12-03 DIAGNOSIS — R29.898 WEAKNESS OF BOTH LOWER EXTREMITIES: ICD-10-CM

## 2024-12-03 DIAGNOSIS — M48.062 SPINAL STENOSIS OF LUMBAR REGION WITH NEUROGENIC CLAUDICATION: Primary | ICD-10-CM

## 2024-12-03 PROBLEM — M54.9 BACK PAIN: Status: ACTIVE | Noted: 2024-12-03

## 2024-12-03 LAB
BASOPHILS # BLD AUTO: 0.01 10*3/MM3 (ref 0–0.2)
BASOPHILS NFR BLD AUTO: 0.1 % (ref 0–1.5)
DEPRECATED RDW RBC AUTO: 42.3 FL (ref 37–54)
EOSINOPHIL # BLD AUTO: 0 10*3/MM3 (ref 0–0.4)
EOSINOPHIL NFR BLD AUTO: 0 % (ref 0.3–6.2)
ERYTHROCYTE [DISTWIDTH] IN BLOOD BY AUTOMATED COUNT: 13.1 % (ref 12.3–15.4)
HCT VFR BLD AUTO: 43.8 % (ref 34–46.6)
HGB BLD-MCNC: 14.9 G/DL (ref 12–15.9)
IMM GRANULOCYTES # BLD AUTO: 0.1 10*3/MM3 (ref 0–0.05)
IMM GRANULOCYTES NFR BLD AUTO: 1.4 % (ref 0–0.5)
LYMPHOCYTES # BLD AUTO: 1.26 10*3/MM3 (ref 0.7–3.1)
LYMPHOCYTES NFR BLD AUTO: 17.3 % (ref 19.6–45.3)
MCH RBC QN AUTO: 30 PG (ref 26.6–33)
MCHC RBC AUTO-ENTMCNC: 34 G/DL (ref 31.5–35.7)
MCV RBC AUTO: 88.1 FL (ref 79–97)
MONOCYTES # BLD AUTO: 0.43 10*3/MM3 (ref 0.1–0.9)
MONOCYTES NFR BLD AUTO: 5.9 % (ref 5–12)
NEUTROPHILS NFR BLD AUTO: 5.47 10*3/MM3 (ref 1.7–7)
NEUTROPHILS NFR BLD AUTO: 75.3 % (ref 42.7–76)
NRBC BLD AUTO-RTO: 0 /100 WBC (ref 0–0.2)
PLATELET # BLD AUTO: 322 10*3/MM3 (ref 140–450)
PMV BLD AUTO: 10 FL (ref 6–12)
RBC # BLD AUTO: 4.97 10*6/MM3 (ref 3.77–5.28)
WBC NRBC COR # BLD AUTO: 7.27 10*3/MM3 (ref 3.4–10.8)

## 2024-12-03 PROCEDURE — G0378 HOSPITAL OBSERVATION PER HR: HCPCS

## 2024-12-03 PROCEDURE — 96375 TX/PRO/DX INJ NEW DRUG ADDON: CPT

## 2024-12-03 PROCEDURE — 99213 OFFICE O/P EST LOW 20 MIN: CPT

## 2024-12-03 PROCEDURE — 25010000002 HYDROMORPHONE PER 4 MG

## 2024-12-03 PROCEDURE — 1125F AMNT PAIN NOTED PAIN PRSNT: CPT

## 2024-12-03 PROCEDURE — 99285 EMERGENCY DEPT VISIT HI MDM: CPT

## 2024-12-03 PROCEDURE — 96374 THER/PROPH/DIAG INJ IV PUSH: CPT

## 2024-12-03 PROCEDURE — 85025 COMPLETE CBC W/AUTO DIFF WBC: CPT | Performed by: PHYSICIAN ASSISTANT

## 2024-12-03 PROCEDURE — 25010000002 DEXAMETHASONE PER 1 MG

## 2024-12-03 RX ORDER — SODIUM CHLORIDE 0.9 % (FLUSH) 0.9 %
10 SYRINGE (ML) INJECTION EVERY 12 HOURS SCHEDULED
Status: DISCONTINUED | OUTPATIENT
Start: 2024-12-04 | End: 2024-12-04 | Stop reason: HOSPADM

## 2024-12-03 RX ORDER — PREDNISONE 10 MG/1
TABLET ORAL
Qty: 30 TABLET | Refills: 0 | Status: SHIPPED | OUTPATIENT
Start: 2024-12-03

## 2024-12-03 RX ORDER — CETIRIZINE HYDROCHLORIDE 10 MG/1
10 TABLET ORAL DAILY
Status: DISCONTINUED | OUTPATIENT
Start: 2024-12-04 | End: 2024-12-04 | Stop reason: HOSPADM

## 2024-12-03 RX ORDER — SODIUM CHLORIDE 0.9 % (FLUSH) 0.9 %
10 SYRINGE (ML) INJECTION AS NEEDED
Status: DISCONTINUED | OUTPATIENT
Start: 2024-12-03 | End: 2024-12-04 | Stop reason: HOSPADM

## 2024-12-03 RX ORDER — AMOXICILLIN 250 MG
2 CAPSULE ORAL 2 TIMES DAILY PRN
Status: DISCONTINUED | OUTPATIENT
Start: 2024-12-03 | End: 2024-12-04 | Stop reason: HOSPADM

## 2024-12-03 RX ORDER — LIDOCAINE 4 G/G
2 PATCH TOPICAL
Status: DISCONTINUED | OUTPATIENT
Start: 2024-12-04 | End: 2024-12-04 | Stop reason: HOSPADM

## 2024-12-03 RX ORDER — METHOCARBAMOL 500 MG/1
500 TABLET, FILM COATED ORAL 4 TIMES DAILY
Status: DISCONTINUED | OUTPATIENT
Start: 2024-12-04 | End: 2024-12-04 | Stop reason: HOSPADM

## 2024-12-03 RX ORDER — PRAVASTATIN SODIUM 40 MG
40 TABLET ORAL DAILY
Status: DISCONTINUED | OUTPATIENT
Start: 2024-12-04 | End: 2024-12-04 | Stop reason: HOSPADM

## 2024-12-03 RX ORDER — BISACODYL 5 MG/1
5 TABLET, DELAYED RELEASE ORAL DAILY PRN
Status: DISCONTINUED | OUTPATIENT
Start: 2024-12-03 | End: 2024-12-04 | Stop reason: HOSPADM

## 2024-12-03 RX ORDER — OXYCODONE AND ACETAMINOPHEN 10; 325 MG/1; MG/1
1 TABLET ORAL EVERY 6 HOURS PRN
COMMUNITY

## 2024-12-03 RX ORDER — HYDROMORPHONE HYDROCHLORIDE 1 MG/ML
0.5 INJECTION, SOLUTION INTRAMUSCULAR; INTRAVENOUS; SUBCUTANEOUS
Status: DISCONTINUED | OUTPATIENT
Start: 2024-12-03 | End: 2024-12-04 | Stop reason: HOSPADM

## 2024-12-03 RX ORDER — DEXAMETHASONE SODIUM PHOSPHATE 10 MG/ML
10 INJECTION INTRAMUSCULAR; INTRAVENOUS ONCE
Status: COMPLETED | OUTPATIENT
Start: 2024-12-03 | End: 2024-12-03

## 2024-12-03 RX ORDER — BISACODYL 10 MG
10 SUPPOSITORY, RECTAL RECTAL DAILY PRN
Status: DISCONTINUED | OUTPATIENT
Start: 2024-12-03 | End: 2024-12-04 | Stop reason: HOSPADM

## 2024-12-03 RX ORDER — PANTOPRAZOLE SODIUM 40 MG/1
40 TABLET, DELAYED RELEASE ORAL
Status: DISCONTINUED | OUTPATIENT
Start: 2024-12-04 | End: 2024-12-04 | Stop reason: HOSPADM

## 2024-12-03 RX ORDER — SODIUM CHLORIDE 9 MG/ML
40 INJECTION, SOLUTION INTRAVENOUS AS NEEDED
Status: DISCONTINUED | OUTPATIENT
Start: 2024-12-03 | End: 2024-12-04 | Stop reason: HOSPADM

## 2024-12-03 RX ORDER — OXYCODONE AND ACETAMINOPHEN 10; 325 MG/1; MG/1
1 TABLET ORAL EVERY 6 HOURS PRN
Status: DISCONTINUED | OUTPATIENT
Start: 2024-12-03 | End: 2024-12-04 | Stop reason: HOSPADM

## 2024-12-03 RX ORDER — LORAZEPAM 0.5 MG/1
0.5 TABLET ORAL 3 TIMES DAILY PRN
Status: DISCONTINUED | OUTPATIENT
Start: 2024-12-03 | End: 2024-12-04 | Stop reason: HOSPADM

## 2024-12-03 RX ORDER — POLYETHYLENE GLYCOL 3350 17 G/17G
17 POWDER, FOR SOLUTION ORAL DAILY PRN
Status: DISCONTINUED | OUTPATIENT
Start: 2024-12-03 | End: 2024-12-04 | Stop reason: HOSPADM

## 2024-12-03 RX ORDER — METOPROLOL SUCCINATE 50 MG/1
50 TABLET, EXTENDED RELEASE ORAL DAILY
Status: DISCONTINUED | OUTPATIENT
Start: 2024-12-04 | End: 2024-12-04 | Stop reason: HOSPADM

## 2024-12-03 RX ORDER — LEVOTHYROXINE SODIUM 50 UG/1
100 TABLET ORAL DAILY
Status: DISCONTINUED | OUTPATIENT
Start: 2024-12-04 | End: 2024-12-04 | Stop reason: HOSPADM

## 2024-12-03 RX ORDER — DEXAMETHASONE SODIUM PHOSPHATE 10 MG/ML
6 INJECTION INTRAMUSCULAR; INTRAVENOUS ONCE
Status: DISCONTINUED | OUTPATIENT
Start: 2024-12-03 | End: 2024-12-03

## 2024-12-03 RX ORDER — DEXAMETHASONE SODIUM PHOSPHATE 4 MG/ML
4 INJECTION, SOLUTION INTRA-ARTICULAR; INTRALESIONAL; INTRAMUSCULAR; INTRAVENOUS; SOFT TISSUE EVERY 6 HOURS
Status: DISCONTINUED | OUTPATIENT
Start: 2024-12-04 | End: 2024-12-04 | Stop reason: HOSPADM

## 2024-12-03 RX ADMIN — Medication 10 ML: at 23:38

## 2024-12-03 RX ADMIN — HYDROMORPHONE HYDROCHLORIDE 0.5 MG: 1 INJECTION, SOLUTION INTRAMUSCULAR; INTRAVENOUS; SUBCUTANEOUS at 23:37

## 2024-12-03 RX ADMIN — DEXAMETHASONE SODIUM PHOSPHATE 10 MG: 10 INJECTION INTRAMUSCULAR; INTRAVENOUS at 21:57

## 2024-12-03 RX ADMIN — METHOCARBAMOL 500 MG: 500 TABLET ORAL at 23:37

## 2024-12-03 NOTE — PROGRESS NOTES
"Chief Complaint  Back Pain (Pt is coming back in for pain, and weakness in her legs)    Subjective        Imani Shankar presents to [facility]  History of Present Illness    History of Present Illness  The patient presents for evaluation of spinal stenosis.    She reports a slight improvement in her condition, estimating a 30 to 40 percent reduction in pain. However, she continues to experience instability, particularly when ascending stairs, necessitating the use of a handrail for support. She has been advised to consult with a spine surgeon, but she is seeking an evaluation from us before proceeding. Her son, who has reviewed her medical imaging, has informed her of pre-existing stenosis and significant swelling. She has previously received injections at the third and fourth vertebrae but not the fifth. She has undergone one session of physical therapy. She was prescribed steroids for a suspected muscle spasm, she did not complete the steroids and  discontinued after a fall on Friday. She reports no tingling or numbness. Her current medication regimen includes tramadol, half a tablet of oxycodone as needed, and Motrin. She has also received a dose of steroids.               Objective   Vital Signs:  /82 (BP Location: Right arm, Patient Position: Sitting, Cuff Size: Adult)   Pulse 84   Temp 97 °F (36.1 °C) (Temporal)   Resp 16   Ht 154.9 cm (60.98\")   Wt 68 kg (150 lb)   SpO2 96%   BMI 28.36 kg/m²   Estimated body mass index is 28.36 kg/m² as calculated from the following:    Height as of this encounter: 154.9 cm (60.98\").    Weight as of this encounter: 68 kg (150 lb).            Review of Systems   Physical Exam  Constitutional:       General: She is not in acute distress.     Appearance: Normal appearance. She is not ill-appearing, toxic-appearing or diaphoretic.   HENT:      Head: Normocephalic and atraumatic.   Eyes:      General: No scleral icterus.        Right eye: " No discharge.         Left eye: No discharge.      Extraocular Movements: Extraocular movements intact.      Conjunctiva/sclera: Conjunctivae normal.      Pupils: Pupils are equal, round, and reactive to light.   Cardiovascular:      Rate and Rhythm: Normal rate and regular rhythm.      Pulses: Normal pulses.      Heart sounds: Normal heart sounds.   Pulmonary:      Effort: Pulmonary effort is normal.      Breath sounds: Normal breath sounds.   Abdominal:      General: Abdomen is flat. Bowel sounds are normal.      Palpations: Abdomen is soft.   Musculoskeletal:         General: Normal range of motion.      Cervical back: Normal range of motion and neck supple.      Lumbar back: Spasms, tenderness and bony tenderness present. Decreased range of motion.   Skin:     General: Skin is warm.   Neurological:      General: No focal deficit present.      Mental Status: She is alert and oriented to person, place, and time. Mental status is at baseline.   Psychiatric:         Mood and Affect: Mood is anxious.         Behavior: Behavior normal.         Thought Content: Thought content normal.         Judgment: Judgment normal.        Result Review :          Results                Assessment and Plan   Diagnoses and all orders for this visit:    1. Spinal stenosis of lumbar region without neurogenic claudication (Primary)  -     predniSONE (DELTASONE) 10 MG tablet; 5pox2d/4pox2d/3pox2d/2pox2d/1pox2d with food.  Dispense: 30 tablet; Refill: 0  -     Ambulatory Referral to Orthopedic Surgery    2. Acute bilateral low back pain, unspecified whether sciatica present  -     predniSONE (DELTASONE) 10 MG tablet; 5pox2d/4pox2d/3pox2d/2pox2d/1pox2d with food.  Dispense: 30 tablet; Refill: 0        Assessment & Plan  1. Spinal stenosis.  The patient's condition has shown a significant improvement, estimated to be more than 30 percent. She reports that she can now bend and perform certain activities that were previously difficult. She  has been informed that changes in bowel and bladder function are emergency symptoms that require immediate medical attention. A referral to the [facility] will be made for further evaluation and management. She will also be prescribed another course of steroids, specifically a Medrol Dosepak, to manage inflammation. She is advised to contact [facility] to initiate conservative physical therapy.               Follow Up   No follow-ups on file.  Patient was given instructions and counseling regarding her condition or for health maintenance advice. Please see specific information pulled into the AVS if appropriate.         Patient or patient representative verbalized consent for the use of Ambient Listening during the visit with  ALICIA Alvarez for chart documentation. 12/3/2024  09:43 EST

## 2024-12-04 ENCOUNTER — APPOINTMENT (OUTPATIENT)
Dept: MRI IMAGING | Facility: HOSPITAL | Age: 74
End: 2024-12-04
Payer: MEDICARE

## 2024-12-04 ENCOUNTER — APPOINTMENT (OUTPATIENT)
Dept: GENERAL RADIOLOGY | Facility: HOSPITAL | Age: 74
End: 2024-12-04
Payer: MEDICARE

## 2024-12-04 ENCOUNTER — READMISSION MANAGEMENT (OUTPATIENT)
Dept: CALL CENTER | Facility: HOSPITAL | Age: 74
End: 2024-12-04
Payer: MEDICARE

## 2024-12-04 VITALS
WEIGHT: 150 LBS | TEMPERATURE: 98.9 F | HEIGHT: 61 IN | BODY MASS INDEX: 28.32 KG/M2 | RESPIRATION RATE: 16 BRPM | DIASTOLIC BLOOD PRESSURE: 85 MMHG | HEART RATE: 94 BPM | OXYGEN SATURATION: 99 % | SYSTOLIC BLOOD PRESSURE: 154 MMHG

## 2024-12-04 LAB
ALBUMIN SERPL-MCNC: 3.9 G/DL (ref 3.5–5.2)
ALBUMIN/GLOB SERPL: 1.1 G/DL
ALP SERPL-CCNC: 130 U/L (ref 39–117)
ALT SERPL W P-5'-P-CCNC: 28 U/L (ref 1–33)
ANION GAP SERPL CALCULATED.3IONS-SCNC: 11.9 MMOL/L (ref 5–15)
APTT PPP: 25 SECONDS (ref 22.7–35.4)
AST SERPL-CCNC: 19 U/L (ref 1–32)
BILIRUB SERPL-MCNC: 0.4 MG/DL (ref 0–1.2)
BUN SERPL-MCNC: 19 MG/DL (ref 8–23)
BUN/CREAT SERPL: 26 (ref 7–25)
CALCIUM SPEC-SCNC: 9.7 MG/DL (ref 8.6–10.5)
CHLORIDE SERPL-SCNC: 99 MMOL/L (ref 98–107)
CO2 SERPL-SCNC: 21.1 MMOL/L (ref 22–29)
CREAT SERPL-MCNC: 0.73 MG/DL (ref 0.57–1)
DEPRECATED RDW RBC AUTO: 41.9 FL (ref 37–54)
EGFRCR SERPLBLD CKD-EPI 2021: 85.9 ML/MIN/1.73
ERYTHROCYTE [DISTWIDTH] IN BLOOD BY AUTOMATED COUNT: 13 % (ref 12.3–15.4)
GLOBULIN UR ELPH-MCNC: 3.4 GM/DL
GLUCOSE SERPL-MCNC: 193 MG/DL (ref 65–99)
HCT VFR BLD AUTO: 41.1 % (ref 34–46.6)
HGB BLD-MCNC: 14 G/DL (ref 12–15.9)
INR PPP: 1.13 (ref 0.9–1.1)
MCH RBC QN AUTO: 30.2 PG (ref 26.6–33)
MCHC RBC AUTO-ENTMCNC: 34.1 G/DL (ref 31.5–35.7)
MCV RBC AUTO: 88.8 FL (ref 79–97)
PLATELET # BLD AUTO: 324 10*3/MM3 (ref 140–450)
PMV BLD AUTO: 9.6 FL (ref 6–12)
POTASSIUM SERPL-SCNC: 4.5 MMOL/L (ref 3.5–5.2)
PROT SERPL-MCNC: 7.3 G/DL (ref 6–8.5)
PROTHROMBIN TIME: 14.7 SECONDS (ref 11.7–14.2)
RBC # BLD AUTO: 4.63 10*6/MM3 (ref 3.77–5.28)
SODIUM SERPL-SCNC: 132 MMOL/L (ref 136–145)
WBC NRBC COR # BLD AUTO: 8.31 10*3/MM3 (ref 3.4–10.8)

## 2024-12-04 PROCEDURE — 96375 TX/PRO/DX INJ NEW DRUG ADDON: CPT

## 2024-12-04 PROCEDURE — 96376 TX/PRO/DX INJ SAME DRUG ADON: CPT

## 2024-12-04 PROCEDURE — 85027 COMPLETE CBC AUTOMATED: CPT

## 2024-12-04 PROCEDURE — 25010000002 ONDANSETRON PER 1 MG: Performed by: NURSE PRACTITIONER

## 2024-12-04 PROCEDURE — G0378 HOSPITAL OBSERVATION PER HR: HCPCS

## 2024-12-04 PROCEDURE — A9577 INJ MULTIHANCE: HCPCS | Performed by: EMERGENCY MEDICINE

## 2024-12-04 PROCEDURE — 72070 X-RAY EXAM THORAC SPINE 2VWS: CPT

## 2024-12-04 PROCEDURE — 72197 MRI PELVIS W/O & W/DYE: CPT

## 2024-12-04 PROCEDURE — 25010000002 DEXAMETHASONE PER 1 MG

## 2024-12-04 PROCEDURE — 25510000002 GADOBENATE DIMEGLUMINE 529 MG/ML SOLUTION: Performed by: EMERGENCY MEDICINE

## 2024-12-04 PROCEDURE — 36415 COLL VENOUS BLD VENIPUNCTURE: CPT

## 2024-12-04 PROCEDURE — 85610 PROTHROMBIN TIME: CPT

## 2024-12-04 PROCEDURE — 72114 X-RAY EXAM L-S SPINE BENDING: CPT

## 2024-12-04 PROCEDURE — 80053 COMPREHEN METABOLIC PANEL: CPT

## 2024-12-04 PROCEDURE — 85730 THROMBOPLASTIN TIME PARTIAL: CPT

## 2024-12-04 PROCEDURE — 99214 OFFICE O/P EST MOD 30 MIN: CPT | Performed by: NURSE PRACTITIONER

## 2024-12-04 PROCEDURE — 72158 MRI LUMBAR SPINE W/O & W/DYE: CPT

## 2024-12-04 RX ORDER — METHOCARBAMOL 500 MG/1
500 TABLET, FILM COATED ORAL 4 TIMES DAILY
Qty: 20 TABLET | Refills: 0 | Status: SHIPPED | OUTPATIENT
Start: 2024-12-04 | End: 2024-12-10

## 2024-12-04 RX ORDER — ONDANSETRON 2 MG/ML
4 INJECTION INTRAMUSCULAR; INTRAVENOUS EVERY 6 HOURS PRN
Status: DISCONTINUED | OUTPATIENT
Start: 2024-12-04 | End: 2024-12-04 | Stop reason: HOSPADM

## 2024-12-04 RX ORDER — FAMOTIDINE 10 MG/ML
20 INJECTION, SOLUTION INTRAVENOUS ONCE
Status: COMPLETED | OUTPATIENT
Start: 2024-12-04 | End: 2024-12-04

## 2024-12-04 RX ADMIN — ONDANSETRON 4 MG: 2 INJECTION, SOLUTION INTRAMUSCULAR; INTRAVENOUS at 10:45

## 2024-12-04 RX ADMIN — LIDOCAINE 2 PATCH: 4 PATCH TOPICAL at 10:46

## 2024-12-04 RX ADMIN — DEXAMETHASONE SODIUM PHOSPHATE 4 MG: 4 INJECTION, SOLUTION INTRAMUSCULAR; INTRAVENOUS at 06:42

## 2024-12-04 RX ADMIN — OXYCODONE AND ACETAMINOPHEN 1 TABLET: 10; 325 TABLET ORAL at 03:49

## 2024-12-04 RX ADMIN — LEVOTHYROXINE SODIUM 100 MCG: 0.05 TABLET ORAL at 10:45

## 2024-12-04 RX ADMIN — METHOCARBAMOL 500 MG: 500 TABLET ORAL at 10:51

## 2024-12-04 RX ADMIN — FAMOTIDINE 20 MG: 10 INJECTION INTRAVENOUS at 10:45

## 2024-12-04 RX ADMIN — PANTOPRAZOLE SODIUM 40 MG: 40 TABLET, DELAYED RELEASE ORAL at 06:42

## 2024-12-04 RX ADMIN — METOPROLOL SUCCINATE 50 MG: 50 TABLET, EXTENDED RELEASE ORAL at 10:46

## 2024-12-04 RX ADMIN — OXYCODONE AND ACETAMINOPHEN 1 TABLET: 10; 325 TABLET ORAL at 18:38

## 2024-12-04 RX ADMIN — CETIRIZINE HYDROCHLORIDE 10 MG: 10 TABLET, FILM COATED ORAL at 10:46

## 2024-12-04 RX ADMIN — DEXAMETHASONE SODIUM PHOSPHATE 4 MG: 4 INJECTION, SOLUTION INTRAMUSCULAR; INTRAVENOUS at 15:09

## 2024-12-04 RX ADMIN — OXYCODONE AND ACETAMINOPHEN 1 TABLET: 10; 325 TABLET ORAL at 10:46

## 2024-12-04 RX ADMIN — GADOBENATE DIMEGLUMINE 13 ML: 529 INJECTION, SOLUTION INTRAVENOUS at 05:19

## 2024-12-04 NOTE — H&P
[facility]   HISTORY AND PHYSICAL    Patient Name: Imani Shankar  : 1950  MRN: [id number]  Primary Care Physician:  Chang Carter MD  Date of admission: 12/3/2024    Subjective   Subjective     Chief Complaint:   Chief Complaint   Patient presents with    Back Pain         HPI:    Imani Shankar is a 74 y.o. female with medical history significant for gallbladder cancer, chronic back pain, GERD, hypertension, and TIA presents with lower back pain that radiates to bilateral hips status post LINA injection about 10 days ago.  She reports this is not the same electrical shock like radicular pain she experienced before, and that she experienced pain relief after LINA injection.  She reports this lumbar pain is less sharp and does not radiate down to both legs, rather they go to bilateral hips.  She also experiences new onset leg weakness, she is able to ambulate, but needs to hang onto a railing.  Passive straight leg lift produces similar pain bilaterally.  She denies paresthesia, denies loss of bowel or bladder control.    Review of Systems   All systems were reviewed and negative except for: Lower back pain that radiates to bilateral hips, bilateral lower leg weakness      Personal History     Past Medical History:   Diagnosis Date    Arthritis     Bleeding ulcer 2018    Cancer 2012    gallbladder - removal w/adjunct chemo and XRT (Dr. Kraft, Dr. Lanier)    Cataract     Chronic low back pain     Disease of thyroid gland     Duodenal ulcer     GERD (gastroesophageal reflux disease)     GI (gastrointestinal bleed) 2018    High cholesterol     History of cholangiocarcinoma     Hypertension     Peripheral neuropathy     TIA (transient ischemic attack)        Past Surgical History:   Procedure Laterality Date    APPENDECTOMY      CATARACT EXTRACTION      CHOLECYSTECTOMY  2012    for gall bladder cancer, removed by Dr. Kendy Martinez    COLONOSCOPY  approx     Too YUEN,  normal per patient     COLONOSCOPY N/A 11/29/2022    Procedure: COLONOSCOPY to cecum and TI:  bx polyps, cold snare polyp;  Surgeon: Janet Hamilton MD;  Location: [facility];  Service: Gastroenterology;  Laterality: N/A;  pre:  screening  post:  polyps, divertidulosis, hemorrhoids    ENDOSCOPY N/A 4/4/2019    Procedure: ESOPHAGOGASTRODUODENOSCOPY with biopsy;  Surgeon: Ruby Ferrari MD;  Location:  [facility] ENDOSCOPY;  Service: Gastroenterology    SHOULDER ARTHROSCOPY W/ ROTATOR CUFF REPAIR Right 9/28/2017    Procedure: RT SHOULDER ARTHROSCOPY ACROMIOPLASTY ROTATOR CUFF REPAIR AND LABRAL DEBRIDEMENT;  Surgeon: Nicho Hill MD;  [place]  [facility] [place] [facility] [place]:     UPPER GASTROINTESTINAL ENDOSCOPY  02/02/2018    [facility]    multipal duodenal ulcers in D1-D2 area, no active bleed    UPPER GASTROINTESTINAL ENDOSCOPY  01/31/2018    [facility]   oozing blood of small ulcer in D1-D2 region, clot overlying ulcer, injected at base, endo clips placed    UPPER GASTROINTESTINAL ENDOSCOPY  03/14/2018    [facility]   previous ulcer almost healed    US GUIDED LYMPH NODE BIOPSY  5/2/2024       Family History: family history includes Hypertension in her brother and father; Kidney failure in her father; No Known Problems in her son and son; Ulcers in her mother. Otherwise pertinent FHx was reviewed and not pertinent to current issue.    Social History:  reports that she has never smoked. She has never been exposed to tobacco smoke. She has never used smokeless tobacco. She reports that she does not drink alcohol and does not use drugs.    Home Medications:  LORazepam, aspirin, levothyroxine, loratadine, metoprolol succinate XL, omeprazole, oxyCODONE-acetaminophen, pravastatin, predniSONE, and traMADol    Allergies:  No Known Allergies    Objective   Objective     Vitals:   Temp:  [97 °F (36.1 °C)-98.5 °F (36.9 °C)] 98.5 °F (36.9 °C)  Heart Rate:  [] 116  Resp:  [16] 16  BP: (124-176)/(82-87)  176/87   Physical Exam:     Constitutional: Awake, alert. Well developed for age. Nontoxic appearing.   Eyes: PERRL x2, sclerae anicteric, no conjunctival injection. No EOM abnormalities.   HENT: NCAT, mucous membranes moist,   Neck: Supple, no thyromegaly, no lymphadenopathy, trachea midline  Respiratory: Clear to auscultation bilaterally, nonlabored respirations   Cardiovascular: RRR, no murmurs, rubs, or gallops, palpable pedal pulses bilaterally. No appreciable edema.   Gastrointestinal: Positive bowel sounds, soft, nontender, not distended.   Musculoskeletal: No bilateral ankle edema, no clubbing or cyanosis to extremities. No obvious deformities.   Psychiatric: Appropriate affect, cooperative. Converses appropriately for age.   Neurologic: Oriented x 3, bilateral lower extremities symmetrically weak. Cranial nerves grossly intact to confrontation, speech clear  Skin: No rashes, skin intact.     Result Review    Result Review:  I have personally reviewed the results from the time of this admission to 12/3/2024 22:03 EST and agree with these findings:  [x]  Laboratory list / accordion  []  Microbiology  [x]  Radiology  []  EKG/Telemetry   []  Cardiology/Vascular   []  Pathology  [x]  Old records  []  Other:  Most notable findings include:     CBC is unremarkable.    L-spine MRI on 11/19/2024 prior to LESI show multilevel degeneration most significant at L2-L3 with grade 1 retrolisthesis, mild central stenosis and moderate bilateral foraminal stenosis    11/25/2024 op note shows planned L3-L4 LESI covered to L5-S1 due to severe interlaminar narrowing at L3-L4.      Assessment & Plan   Assessment / Plan     Brief Patient Summary:  Imani Shankar is a 74 y.o. female who underwent LESI on 11/25/2024 and experienced relief, however, she soon developed lumbar pain that radiates to bilateral hips and new onset BLE weakness that are symmetrical.     Active Hospital Problems:  Active Hospital Problems    Diagnosis      **Back pain      Plan:     Lumbar back pain status post LESI on 11/25/2024  -L5-S1 LESI 10 days ago, she experienced relief after injection  -Back pain is less sharp, radiates to bilateral hips.  New onset BLE weakness  -L-spine MRI on 11/19/2024 show L2-L3 grade 1 retrolisthesis, central stenosis, moderate bilateral foraminal stenosis  -Multimodal analgesia   -MRI lumbar and pelvis with and without contrast pending  -Physical therapy consultation  -Neurosurgery consultation      VTE Prophylaxis:  Mechanical        CODE STATUS: Full code     Admission Status:  I believe this patient meets observation status.    Electronically signed by ALICIA Root, 12/03/24, 10:03 PM EST.        75 minutes has been spent by [facility] providers in the care of this patient while under observation status      I have worn appropriate PPE during this patient encounter, sanitized my hands both with entering and exiting patient's room.    I have discussed plan of care with patient including advance care plan and/or surrogate decision maker.  Patient advises that their spouse, Sandra Shankar, will be their primary surrogate decision maker

## 2024-12-04 NOTE — SIGNIFICANT NOTE
"   12/04/24 1251   OTHER   Discipline physical therapist   Rehab Time/Intention   Session Not Performed other (see comments)  (TED and ortho consulted for \"Bilateral labrum tear, bilateral gluteus medius Partial-thickness tearing  & minimus tendinosis.\")       "

## 2024-12-04 NOTE — DISCHARGE SUMMARY
ED OBSERVATION PROGRESS/DISCHARGE SUMMARY    Date of Admission: 12/3/2024   LOS: 0 days   PCP: Chang Carter MD    Final Diagnosis: Spinal stenosis of lumbar region with neurogenic claudication    Hospital Outcome:     74 y.o. female with medical history significant for gallbladder cancer, chronic back pain, GERD, hypertension, and TIA mated to the ED observation unit for further management due to worsening lower back pain radiculopathy.  She had LESI approximately 10 days ago without significant improvement.    Neurosurgery was consulted.  MRI shows stenosis at L2-3, more severe at L4-5 and L5-S1.  Nonurgent surgical intervention is recommended.  She had flexion and extension x-rays along with scoliosis films prior to discharge.  They will arrange for follow-up with him in the office next week.  She can continue the steroid she was already prescribed by her primary care doctor.  I prescribed Robaxin as needed.  She has been ambulating without assistance.  She will be discharged home.    MRI pelvis showed tear of anterior superior right labrum extending to the superior lateral labrum.  Tear of superior lateral left labrum with extension of the posterior superior left labrum, there is superimposing fraying of the anterior superior labrum.  Less than 50% thinning of bilateral femoral acetabular articular cartilage.  Partial-thickness tearing and moderate tendinosis of the bilateral gluteus medius insertions involving 30% of the tendon fibers.  Bilateral gluteus minimus insertional tendinosis.  I spoke with orthopedic surgery, Dr. Long.  He feels that these findings are likely chronic and unlikely to be contributing to her pain.  She was cleared to ambulate from his standpoint.  I did provide her with his office's contact information should she need to follow-up.      ROS:  General: no fevers, chills  Respiratory: no cough, dyspnea  Cardiovascular: no chest pain, palpitations  Abdomen: No abdominal pain, nausea,  vomiting, or diarrhea  Neurologic: weakness BLE  MS: + back pain    Objective   Physical Exam:  I have reviewed the vital signs.  Temp:  [98.5 °F (36.9 °C)-99.1 °F (37.3 °C)] 98.9 °F (37.2 °C)  Heart Rate:  [] 94  Resp:  [16-18] 16  BP: (127-180)/() 154/85  General Appearance:    Alert, cooperative, no distress  Head:    Normocephalic, atraumatic  Eyes:    Sclerae anicteric  Neck:   Supple, no mass  Lungs: Clear to auscultation bilaterally, respirations unlabored  Heart: Regular rate and rhythm, S1 and S2 normal, + murmur, no rub or gallop  Abdomen:  Soft, nontender, bowel sounds active, nondistended  Extremities: No clubbing, cyanosis, or edema to lower extremities  Pulses:  2+ and symmetric in distal lower extremities  Skin: No rashes   Neurologic: Oriented x3, Normal strength to extremities    Results Review:    I have reviewed the labs, radiology results and diagnostic studies.    Results from last 7 days   Lab Units 12/04/24  0216   WBC 10*3/mm3 8.31   HEMOGLOBIN g/dL 14.0   HEMATOCRIT % 41.1   PLATELETS 10*3/mm3 324     Results from last 7 days   Lab Units 12/04/24  0216   SODIUM mmol/L 132*   POTASSIUM mmol/L 4.5   CHLORIDE mmol/L 99   CO2 mmol/L 21.1*   BUN mg/dL 19   CREATININE mg/dL 0.73   CALCIUM mg/dL 9.7   BILIRUBIN mg/dL 0.4   ALK PHOS U/L 130*   ALT (SGPT) U/L 28   AST (SGOT) U/L 19   GLUCOSE mg/dL 193*     Imaging Results (Last 24 Hours)       Procedure Component Value Units Date/Time    XR Spine Lumbar Complete With Flex & Ext [[id number]] Collected: 12/04/24 1801     Updated: 12/04/24 1808    Narrative:      XR SPINE LUMBAR COMPLETE W FLEX EXT-, XR SPINE THORACIC 2 VW-     Clinical: Back pain     FINDINGS: There is 6 mm of retrolisthesis of L2 on L3 which is stable in  the neutral, flexion and extension views. There is no lumbar  spondylolysis. No lumbar compression deformity. There is moderate to  substantial multilevel disc degeneration which is most pronounced at  L1-2. There is lower  lumbar facet hypertrophy also seen. There is 9  degrees of lumbar scoliosis convexity towards the right. There are areas  of endplate hypertrophy as well as spur formation.     Moderate to substantial multilevel T-spine disc degeneration without  compression deformity or subluxation. There is anterior spurring and  osteophyte formation involving the upper thoracic spine.     CONCLUSION: Degenerative change as described above.     This report was finalized on 12/4/2024 6:05 PM by Dr. Anuel Garcia M.D  on Workstation: [id number]       XR Spine Thoracic 2 View [[id number]] Collected: 12/04/24 1801     Updated: 12/04/24 1808    Narrative:      XR SPINE LUMBAR COMPLETE W FLEX EXT-, XR SPINE THORACIC 2 VW-     Clinical: Back pain     FINDINGS: There is 6 mm of retrolisthesis of L2 on L3 which is stable in  the neutral, flexion and extension views. There is no lumbar  spondylolysis. No lumbar compression deformity. There is moderate to  substantial multilevel disc degeneration which is most pronounced at  L1-2. There is lower lumbar facet hypertrophy also seen. There is 9  degrees of lumbar scoliosis convexity towards the right. There are areas  of endplate hypertrophy as well as spur formation.     Moderate to substantial multilevel T-spine disc degeneration without  compression deformity or subluxation. There is anterior spurring and  osteophyte formation involving the upper thoracic spine.     CONCLUSION: Degenerative change as described above.     This report was finalized on 12/4/2024 6:05 PM by Dr. Anuel Garcia M.D  on Workstation: [id number]       MRI Lumbar Spine With & Without Contrast [[id number]] Collected: 12/04/24 0640     Updated: 12/04/24 0640    Narrative:        Patient: SYLVIE LIMA  Time Out: 06:39  Exam(s): MRI L SPINE W WO Contrast     EXAM:    MR Lumbar Spine Without and With Intravenous Contrast    CLINICAL HISTORY:     Reason for exam: worsening weakness to lower extremities, recent   epidural  injection.    TECHNIQUE:    Magnetic resonance images of the lumbar spine without and with   intravenous contrast in multiple planes.    COMPARISON:    6 13 2018    FINDINGS:    Vertebrae:  See below.      Spinal cord:  Unremarkable.  Normal signal.  No abnormal enhancement.    Soft tissues:  Unremarkable.     DISCS SPINAL CANAL NEURAL FORAMINA:    T11-T12:  At T11-T12, anterior osteophyte formation, mild facet   osteoarthrosis.    T12-L1:  At T12-L1, anterior osteophyte formation posterior disc bulge   measuring 1 mm AP indents the thecal sac but does not significantly near   the spinal canal.  Mild facet osteoarthrosis.    L1-L2:  At L1-L2, anterior and posterior osteophyte formation with   intervertebral disc height loss.  Retrolisthesis measuring 1 mm.    Posterior disc bulge measuring 3 mm is AP indents the thecal sac but does   not significantly narrow the spinal canal.  Moderate bilateral   neuroforaminal narrowing.    L2-L3:  At L2-L3, anterior and posterior osteophyte formation with   severe disc height loss.  Modic type I and Modic type II degenerative   endplate changes.  Retrolisthesis measuring 3 mm results in moderate-  severe spinal canal narrowing.  Moderate bilateral neural foraminal   narrowing, mild facet osteoarthrosis.    L3-L4:  At L3-L4, anterior and posterior osteophyte formation.    Intervertebral disc height loss.  Posterior disc bulge and ligamentum   flavum thickening severely narrow the spinal canal.  Moderate bilateral   neural foraminal narrowing.  Moderate facet osteoarthrosis.    L4-L5:  At L4-L5, anterior and posterior osteophyte formation.    Posterior disc bulge and ligament of flavum thickening resulting in   critical spinal canal narrowing with compression of the cauda equina   nerve roots.  Severe right and moderate left neural foraminal narrowing.    Moderate facet osteoarthrosis.    L5-S1:  At L5-S1 anterior posterior osteophyte formation.    Intervertebral disc height loss.   Posterior disc bulge measuring 4 mm is   AP moderately narrows the spinal canal.  Moderate bilateral   neuroforaminal narrowing.  Mild facet osteoarthrosis.    Other findings:  At T9-T10, posterior disc bulge measuring 4 mm AP   indents the thecal sac but does not significantly narrow the spinal canal.    IMPRESSION:       1.  At L4-L5, anterior and posterior osteophyte formation.  Posterior   disc bulge and ligament of flavum thickening resulting in critical spinal   canal narrowing with compression of the cauda equina nerve roots.  Severe   right and moderate left neural foraminal narrowing.  Moderate facet   osteoarthrosis.  2.  At L2-L3, anterior and posterior osteophyte formation with severe   disc height loss.  Modic type I and Modic type II degenerative endplate   changes.  Retrolisthesis measuring 3 mm results in moderate-severe spinal   canal narrowing.  Moderate bilateral neural foraminal narrowing, mild   facet osteoarthrosis.  3.  At L3-L4, anterior and posterior osteophyte formation.    Intervertebral disc height loss.  Posterior disc bulge and ligamentum   flavum thickening severely narrow the spinal canal.  Moderate bilateral   neural foraminal narrowing.  Moderate facet osteoarthrosis.  4.  At L5-S1 anterior posterior osteophyte formation.  Intervertebral   disc height loss.  Posterior disc bulge measuring 4 mm is AP moderately   narrows the spinal canal.  Moderate bilateral neuroforaminal narrowing.    Mild facet osteoarthrosis.  5.  At T9-T10, posterior disc bulge measuring 4 mm AP indents the thecal   sac but does not significantly narrow the spinal canal.  6.  At L1-L2, anterior and posterior osteophyte formation with   intervertebral disc height loss.  Retrolisthesis measuring 1 mm.    Posterior disc bulge measuring 3 mm is AP indents the thecal sac but does   not significantly narrow the spinal canal.  Moderate bilateral   neuroforaminal narrowing.      Impression:          Electronically  signed by Demetrius Hummel MD on 12-04-24 at 0639    MRI Pelvis With & Without Contrast [[id number]] Collected: 12/04/24 0624     Updated: 12/04/24 0624    Narrative:        Patient: SYLVIE LIMA  Time Out: 06:24  Exam(s): MRI PELVIS W WO Contrast     EXAM:    MR Pelvis Without and With Intravenous Contrast    CLINICAL HISTORY:     Reason for exam: lumbar   hip pain.    TECHNIQUE:    Multiplanar magnetic resonance images of the pelvis without and with   intravenous contrast.    COMPARISON:  CT dated 8 30 2024.    FINDINGS:    Bowel:  Unremarkable.  No obstruction.  No mucosal thickening.    Intraperitoneal space:  Unremarkable.  No free air.  No free pelvic   fluid, no enlarged or morphologically normal lymph nodes.    Bladder:  Unremarkable.  No stones.  No mass.    Ovaries:  Unremarkable as visualized.  No mass or complex cyst.    Uterus cervix:  Unremarkable.  No mass.  Endometrial stripe is normal   in thickness and appearance.    Bones joints:  Tear of the anterior superior right labrum extending to   the superior lateral labrum.  Tear of the superior lateral left labrum   with extension of the posterior superior left labrum, there is   superimposed fraying of the anterior superior labrum.  Less than 50%   thinning of the bilateral femoral acetabular articular cartilage.  No   acute fracture.  No dislocation.    Soft tissues:  Partial-thickness tearing and moderate tendinosis of the   bilateral gluteus medius insertions involving 30% of the tendon fibers.    Bilateral gluteus minimus insertional tendinosis.    Vasculature:  Unremarkable.  No lower abdominal aortic aneurysm.    Lymph nodes:  See above.    IMPRESSION:       1.  Tear of the anterior superior right labrum extending to the superior   lateral labrum.  2.  Tear of the superior lateral left labrum with extension of the   posterior superior left labrum, there is superimposed fraying of the   anterior superior labrum.  3.  Less than 50% thinning of the  bilateral femoral acetabular articular   cartilage.  4.  Partial-thickness tearing and moderate tendinosis of the bilateral   gluteus medius insertions involving 30% of the tendon fibers.  5.  Bilateral gluteus minimus insertional tendinosis.      Impression:          Electronically signed by Demetrius Hummel MD on 12-04-24 at 0624            I have reviewed the medications.     Discharge Medications        New Medications        Instructions Start Date   methocarbamol 500 MG tablet  Commonly known as: ROBAXIN   500 mg, Oral, 4 Times Daily             Continue These Medications        Instructions Start Date   aspirin 81 MG chewable tablet   81 mg, Oral, Daily      levothyroxine 100 MCG tablet  Commonly known as: SYNTHROID, LEVOTHROID   100 mcg, Daily      loratadine 10 MG tablet  Commonly known as: CLARITIN   10 mg, Daily      LORazepam 0.5 MG tablet  Commonly known as: ATIVAN   1 tablet, 3 Times Daily PRN      metoprolol succinate XL 50 MG 24 hr tablet  Commonly known as: TOPROL-XL   50 mg, Daily      omeprazole 40 MG capsule  Commonly known as: priLOSEC   1 capsule, Daily      oxyCODONE-acetaminophen  MG per tablet  Commonly known as: PERCOCET   1 tablet, Every 6 Hours PRN      pravastatin 40 MG tablet  Commonly known as: PRAVACHOL   40 mg, Oral, Daily      predniSONE 10 MG tablet  Commonly known as: DELTASONE   5pox2d/4pox2d/3pox2d/2pox2d/1pox2d with food.      traMADol 50 MG tablet  Commonly known as: ULTRAM   50 mg, Oral, Every 6 Hours PRN              ---------------------------------------------------------------------------------------------  Assessment & Plan   Assessment/Problem List    Back pain    Plan:    Lumbar back pain status post LESI on 11/25/2024  -L-spine MRI on 11/19/2024 show L2-L3 grade 1 retrolisthesis, central stenosis, moderate bilateral foraminal stenosis  -Multimodal analgesia   -MRI lumbar spine shows stenosis at L2-5/3, more severe at L4/5 and L5-S1  -MRI pelvis shows tear of  anterior superior right labrum extending to the superior lateral labrum.  Tear of the superior lateral left labrum with extension of the posterior superior left labrum, there is superimposing fraying of the anterior superior labrum.  Partial-thickness tearing and moderate tendinosis of the the bilateral gluteus medius insertions involving 30% of the tendon fibers.  Bilateral gluteus minimus insertional tendinosis noted.  -Orthopedic surgery consulted, changes noted on MRI likely chronic.  She can ambulate from their standpoint.  If she is cleared to discharge by neurosurgery she can discharge and follow-up with them in the office as needed.  -Neurosurgery consulted  -Emergent surgery recommended  -Neurosurgery to arrange for follow-up next week  -She can continue the course of steroids prescribed by her primary care doctor  -Robaxin as needed    Hypothyroidism  -Continue levothyroxine    Hyperlipidemia  -Continue pravastatin     Disposition: Home    Follow-up after Discharge: Primary care and neurosurgery    This note will serve as a discharge summary    Cee Winters, APRN 12/04/24 18:15 EST    I have worn appropriate PPE during this patient encounter, sanitized my hands both with entering and exiting patient's room.    32 minutes has been spent by [facility] providers in the care of this patient while under observation status

## 2024-12-04 NOTE — ED PROVIDER NOTES
MD ATTESTATION NOTE    The CESARIO and I have discussed this patient's history, physical exam, and treatment plan.  I have reviewed the documentation and personally had a face to face interaction with the patient. I affirm the documentation and agree with the treatment and plan.  The attached note describes my personal findings.        SHARED APC FACE TO FACE: I performed a substantive part of the MDM during the patient's E/M visit. I personally evaluated and examined the patient. I personally made or approved the documented management plan and acknowledge its risk of complications.        Brief HPI: Patient presents for back pain and weakness.  Patient apparently has had MRI recently that showed spinal stenosis.  Has had epidural injections.  Patient now having increasing weakness of her lower extremities.  Patient has had no bowel or bladder issues.    PHYSICAL EXAM  ED Triage Vitals   Temp Heart Rate Resp BP SpO2   12/03/24 2047 12/03/24 2047 12/03/24 2047 12/03/24 2054 12/03/24 2047   98.5 °F (36.9 °C) 116 16 176/87 96 %      Temp src Heart Rate Source Patient Position BP Location FiO2 (%)   -- 12/03/24 2047 12/03/24 2054 12/03/24 2054 --    Monitor Lying Right arm          GENERAL: no acute distress  HENT: nares patent  EYES: no scleral icterus  CV: regular rhythm, normal rate  RESPIRATORY: normal effort  ABDOMEN: soft  MUSCULOSKELETAL: no deformity  NEURO: alert, moves all extremities, follows commands.  Patient does have bilateral lower extremity weakness  PSYCH:  calm, cooperative  SKIN: warm, dry    Vital signs and nursing notes reviewed.    ED Course as of 12/04/24 0005   Tue Dec 03, 2024   2135 Discussed case with Dr. Terrazas, neurosurgery, who agrees with plan for MRI lumbar spine and steroids. Will consult in observation unit. [DC]   2140 Discussed case with SOBEIDA Root, who will accept for admission to observation unit. [DC]      ED Course User Index  [DC] Karina Izaguirre PA         Plan:  admit       Royce Queen MD  12/04/24 0005

## 2024-12-04 NOTE — PLAN OF CARE
Goal Outcome Evaluation:               Pt admitted overnight for chronic back pain with increasing bilateral leg weakness after receiving an injection at her pcp. She has a history of gall bladder cancer with radiation. She has had an mri overnight that was positive for bilateral labrum tear and bilateral gluteus medius/minimus tendinosis. No sensory changes in extremities. Pt has been npo overnight and ortho consult called.

## 2024-12-04 NOTE — ED PROVIDER NOTES
EMERGENCY DEPARTMENT ENCOUNTER      PCP: Chang Carter MD  Patient Care Team:  Chang Carter MD as PCP - General (Family Medicine)  Ana Meade MD as Gynecologist (Gynecology)  Jerry Cleveland Jr., MD as Consulting Physician (Hematology and Oncology)  Donnell Felder MD as Referring Physician (Otolaryngology)   Independent Historians: Patient    HPI:  Chief Complaint: Back pain, leg weakness   A complete HPI/ROS/PMH/PSH/SH/FH are unobtainable due to: None    Chronic or social conditions impacting patient care (social determinants of health): None    Context: Imani Shankar is a 74 y.o. female who presents to the ED c/o acute increase in back pain and lower extremity weakness.  Patient reports recent MRI showing significant spinal stenosis.  She underwent a epidural injection on 11/25/2024.  She states her pain had improved but she has had increased weakness to both of her legs since the time of the injection.  No fevers, chills, urinary incontinence or urinary retention.  She was seen by primary care provider today and given prescription for steroids and was told referral to [facility] would be placed.  She denies any new falls or injuries.    Review of prior external notes and/or external test results outside of this encounter: MRI lumbar spine on 11/20/24 showed multilevel degenerative changes noted in the mid and lower lumbar spine.  Most significant findings at L2-L3.  Reactive marrow edema in the mid and lower lumbar spine.  There is severe central canal stenosis at L4-L5.    CMP on 8/30/2024 showed mildly elevated alkaline phosphatase unchanged from prior.  Hemoglobin A1c on 4/3/2019 was 5.8.      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    Chronic night sweats 05/07/2024     Resolved Ambulatory Problems     Diagnosis Date Noted    Acute gastric ulcer with hemorrhage 12/21/2018    Encounter for screening for malignant neoplasm of colon 10/13/2022     Past Medical History:   Diagnosis Date    Arthritis     Bleeding ulcer 2018    Cancer 01/2012    Cataract     Chronic low back pain     Disease of thyroid gland     Duodenal ulcer     GERD (gastroesophageal reflux disease)     GI (gastrointestinal bleed) 02/02/2018    High cholesterol     History of cholangiocarcinoma     Hypertension     Peripheral neuropathy        The patient has started, but not completed, their COVID-19 vaccination series.    PAST SURGICAL HISTORY  Past Surgical History:   Procedure Laterality Date    APPENDECTOMY      CATARACT EXTRACTION      CHOLECYSTECTOMY  01/2012    for gall bladder cancer, removed by Dr. Kendy Martinez    COLONOSCOPY  approx 2013    Too YUEN,  normal per patient    COLONOSCOPY N/A 11/29/2022    Procedure: COLONOSCOPY to cecum and TI:  bx polyps, cold snare polyp;  Surgeon: Janet Hamilton MD;  Location: [facility] ENDOSCOPY;  Service: Gastroenterology;  Laterality: N/A;  pre:  screening  post:  polyps, divertidulosis, hemorrhoids    ENDOSCOPY N/A 4/4/2019    Procedure: ESOPHAGOGASTRODUODENOSCOPY with biopsy;  Surgeon: Ruby Ferrari MD;  Location: [facility] ENDOSCOPY;  Service: Gastroenterology    SHOULDER ARTHROSCOPY W/ ROTATOR CUFF REPAIR Right 9/28/2017    Procedure: RT SHOULDER ARTHROSCOPY ACROMIOPLASTY ROTATOR CUFF REPAIR AND LABRAL DEBRIDEMENT;  Surgeon: Nicho Hill MD;  Location: [facility] OR [facility];  Service:     UPPER GASTROINTESTINAL ENDOSCOPY  02/02/2018    [facility]    multipal duodenal ulcers in D1-D2 area,  no active bleed    UPPER GASTROINTESTINAL ENDOSCOPY  01/31/2018    [facility]   oozing blood of small ulcer in D1-D2 region, clot overlying ulcer, injected at base, endo clips placed    UPPER GASTROINTESTINAL ENDOSCOPY  03/14/2018    [facility]   previous ulcer almost healed    US GUIDED LYMPH NODE BIOPSY  5/2/2024         FAMILY HISTORY  Family History   Problem Relation Age of Onset    Ulcers Mother     Kidney failure Father     Hypertension Father     Hypertension Brother     No Known Problems Son     No Known Problems Son     Breast cancer Neg Hx     Ovarian cancer Neg Hx     Uterine cancer Neg Hx     Colon cancer Neg Hx     Malig Hyperthermia Neg Hx          SOCIAL HISTORY  Social History     Socioeconomic History    Marital status:      Spouse name: TRACEY    Number of children: 4   Tobacco Use    Smoking status: Never     Passive exposure: Never    Smokeless tobacco: Never   Vaping Use    Vaping status: Never Used   Substance and Sexual Activity    Alcohol use: No    Drug use: No    Sexual activity: Yes     Partners: Male     Birth control/protection: Post-menopausal     Comment: spouse = TRACEY          ALLERGIES  Patient has no known allergies.        REVIEW OF SYSTEMS  Review of Systems   Constitutional:  Negative for fever.   Genitourinary:  Negative for difficulty urinating.   Musculoskeletal:  Positive for back pain.   Neurological:  Positive for weakness.        All systems reviewed and negative except for those discussed in HPI.       PHYSICAL EXAM    I have reviewed the triage vital signs and nursing notes.    ED Triage Vitals [12/03/24 2047]   Temp Heart Rate Resp BP SpO2   98.5 °F (36.9 °C) 116 16 -- 96 %      Temp src Heart Rate Source Patient Position BP Location FiO2 (%)   -- Monitor -- -- --       Physical Exam  GENERAL: alert, no acute distress  SKIN: Warm, dry  HENT: Normocephalic, atraumatic  EYES: no scleral icterus  CV: regular rhythm, regular rate  RESPIRATORY:  normal effort, lungs clear  ABDOMEN: soft, nontender, nondistended  MUSCULOSKELETAL: no deformity  NEURO: alert, moves all extremities, there is some generalized weakness of the lower extremities, she has some tenderness over the lumbar spine without skin changes, follows commands          LAB RESULTS  Recent Results (from the past 24 hours)   CBC Auto Differential    Collection Time: 12/03/24  9:56 PM    Specimen: Blood   Result Value Ref Range    WBC 7.27 3.40 - 10.80 10*3/mm3    RBC 4.97 3.77 - 5.28 10*6/mm3    Hemoglobin 14.9 12.0 - 15.9 g/dL    Hematocrit 43.8 34.0 - 46.6 %    MCV 88.1 79.0 - 97.0 fL    MCH 30.0 26.6 - 33.0 pg    MCHC 34.0 31.5 - 35.7 g/dL    RDW 13.1 12.3 - 15.4 %    RDW-SD 42.3 37.0 - 54.0 fl    MPV 10.0 6.0 - 12.0 fL    Platelets 322 140 - 450 10*3/mm3    Neutrophil % 75.3 42.7 - 76.0 %    Lymphocyte % 17.3 (L) 19.6 - 45.3 %    Monocyte % 5.9 5.0 - 12.0 %    Eosinophil % 0.0 (L) 0.3 - 6.2 %    Basophil % 0.1 0.0 - 1.5 %    Immature Grans % 1.4 (H) 0.0 - 0.5 %    Neutrophils, Absolute 5.47 1.70 - 7.00 10*3/mm3    Lymphocytes, Absolute 1.26 0.70 - 3.10 10*3/mm3    Monocytes, Absolute 0.43 0.10 - 0.90 10*3/mm3    Eosinophils, Absolute 0.00 0.00 - 0.40 10*3/mm3    Basophils, Absolute 0.01 0.00 - 0.20 10*3/mm3    Immature Grans, Absolute 0.10 (H) 0.00 - 0.05 10*3/mm3    nRBC 0.0 0.0 - 0.2 /100 WBC       Ordered the above labs and independently reviewed and interpreted the results.        RADIOLOGY  No Radiology Exams Resulted Within Past 24 Hours    I ordered the above noted radiological studies. Independently reviewed and interpreted by me.  See dictation for official radiology interpretation.      PROCEDURES    Procedures      MEDICATIONS GIVEN IN ER    Medications   sodium chloride 0.9 % flush 10 mL (has no administration in time range)   sodium chloride 0.9 % flush 10 mL (10 mL Intravenous Given 12/3/24 6594)   sodium chloride 0.9 % flush 10 mL (has no administration in time range)   sodium  chloride 0.9 % infusion 40 mL (has no administration in time range)   sennosides-docusate (PERICOLACE) 8.6-50 MG per tablet 2 tablet (has no administration in time range)     And   polyethylene glycol (MIRALAX) packet 17 g (has no administration in time range)     And   bisacodyl (DULCOLAX) EC tablet 5 mg (has no administration in time range)     And   bisacodyl (DULCOLAX) suppository 10 mg (has no administration in time range)   levothyroxine (SYNTHROID, LEVOTHROID) tablet 100 mcg (has no administration in time range)   cetirizine (zyrTEC) tablet 10 mg (has no administration in time range)   LORazepam (ATIVAN) tablet 0.5 mg (has no administration in time range)   metoprolol succinate XL (TOPROL-XL) 24 hr tablet 50 mg (has no administration in time range)   pantoprazole (PROTONIX) EC tablet 40 mg (has no administration in time range)   oxyCODONE-acetaminophen (PERCOCET)  MG per tablet 1 tablet (has no administration in time range)   pravastatin (PRAVACHOL) tablet 40 mg (has no administration in time range)   methocarbamol (ROBAXIN) tablet 500 mg (500 mg Oral Given 12/3/24 2337)   dexAMETHasone (DECADRON) injection 4 mg (has no administration in time range)   Lidocaine 4 % 2 patch (has no administration in time range)   HYDROmorphone (DILAUDID) injection 0.5 mg (0.5 mg Intravenous Given 12/3/24 2337)   dexAMETHasone (DECADRON) injection 10 mg (10 mg Intravenous Given 12/3/24 2157)         PROGRESS, DATA ANALYSIS, CONSULTS, AND MEDICAL DECISION MAKING    All labs have been independently reviewed and interpreted by me.  All radiology studies have been independently reviewed and interpreted by me and discussed with radiologist dictating the report.   EKG's independently reviewed and interpreted by me.  Discussion below represents my analysis of pertinent findings related to patient's condition, differential diagnosis, treatment plan and final disposition.    My differential diagnosis for back pain includes but is  not limited to:  Musculoskeletal strain, contusion, retroperitoneal hematoma, disc protrusion, vertebral fracture, transverse process fracture, rib fracture, facet syndrome, sacroiliac joint strain, sciatica, renal injury, splenic injury, pancreatic injury, osteoarthritis, lumbar spondylosis, spinal stenosis, ankylosing spondylitis, sacroiliac joint inflammation, pancreatitis, perforated peptic ulcer, diverticulitis, epidural abscess, osteomyelitis, retroperitoneal abscess, pyelonephritis, pneumonia, subphrenic abscess, tuberculosis, neurofibroma, meningioma, multiple myeloma, lymphoma, metastatic cancer, primary cancer, AAA, aortic dissection, spinal ischemia, referred pain, ureterolithiasis      ED Course as of 12/03/24 2357   Tue Dec 03, 2024   2135 Discussed case with Dr. Terrazas, neurosurgery, who agrees with plan for MRI lumbar spine and steroids. Will consult in observation unit. [DC]   2140 Discussed case with SOBEIDA Root, who will accept for admission to observation unit. [DC]      ED Course User Index  [DC] Karina Izaguirre PA             AS OF 23:57 EST VITALS:    BP - 161/94  HR - 83  TEMP - 99 °F (37.2 °C) (Oral)  O2 SATS - 95%        DIAGNOSIS  Final diagnoses:   Spinal stenosis of lumbar region with neurogenic claudication   Weakness of both lower extremities         DISPOSITION  ED Disposition       ED Disposition   Decision to Admit    Condition   --    Comment   --                  Note Disclaimer: At [facility], we believe that sharing information builds trust and better relationships. You are receiving this note because you recently visited [facility]. It is possible you will see health information before a provider has talked with you about it. This kind of information can be easy to misunderstand. To help you fully understand what it means for your health, we urge you to discuss this note with your provider.         Karina Izaguirre PA  12/03/24 2261

## 2024-12-04 NOTE — CONSULTS
[facility] NEUROSURGERY CONSULT NOTE    Patient name: Imani Shankar  Referring Provider: ALICIA Root  Reason for Consultation: Low back and bilateral lower extremity pain    Patient Care Team:  Chang Carter MD as PCP - General (Family Medicine)  Ana Meade MD as Gynecologist (Gynecology)  Jerry Cleveland Jr., MD as Consulting Physician (Hematology and Oncology)  Donnell Felder MD as Referring Physician (Otolaryngology)    Chief complaint: Low back and bilateral lower extremity pain    Subjective .     History of present illness:    Patient is a 74 y.o.  female with past medical history of gallbladder cancer in 2012, chronic back pain, HTN, TIA.  Patient presents with ongoing and progressing low back pain since the last week of October.  She describes the pain in her legs as a electrical shock that is equal down both legs but worse in the calves.  She did get a LESI injection 10 days ago which did help her back pain but has noted increased weakness in her legs.  She denies recent injury or trauma, loss of bowel or bladder function, saddle paresthesia.  She has had no previous spinal surgeries, she takes a baby aspirin a day but no other anticoagulating medication.      History  PAST MEDICAL HISTORY  Past Medical History:   Diagnosis Date    Arthritis     Bleeding ulcer 2018    Cancer 01/2012    gallbladder - removal w/adjunct chemo and XRT (Dr. Kraft, Dr. Lanier)    Cataract     Chronic low back pain     Disease of thyroid gland     Duodenal ulcer     GERD (gastroesophageal reflux disease)     GI (gastrointestinal bleed) 02/02/2018    High cholesterol     History of cholangiocarcinoma     Hypertension     Peripheral neuropathy     TIA (transient ischemic attack)        PAST SURGICAL HISTORY  Past Surgical History:   Procedure Laterality Date    APPENDECTOMY      CATARACT EXTRACTION      CHOLECYSTECTOMY  01/2012    for gall bladder cancer, removed by Dr. Kendy Martinez    COLONOSCOPY  approx 2013     Too YUEN,  normal per patient    COLONOSCOPY N/A 11/29/2022    Procedure: COLONOSCOPY to cecum and TI:  bx polyps, cold snare polyp;  Surgeon: Janet Hamilton MD;  Location:  [facility];  Service: Gastroenterology;  Laterality: N/A;  pre:  screening  post:  polyps, divertidulosis, hemorrhoids    ENDOSCOPY N/A 4/4/2019    Procedure: ESOPHAGOGASTRODUODENOSCOPY with biopsy;  Surgeon: Ruby Ferrari MD;  Location:  [facility];  Service: Gastroenterology    SHOULDER ARTHROSCOPY W/ ROTATOR CUFF REPAIR Right 9/28/2017    Procedure: RT SHOULDER ARTHROSCOPY ACROMIOPLASTY ROTATOR CUFF REPAIR AND LABRAL DEBRIDEMENT;  Surgeon: Nicho Hill MD;  Location:  [facility];  Service:     UPPER GASTROINTESTINAL ENDOSCOPY  02/02/2018    [facility]    multipal duodenal ulcers in D1-D2 area, no active bleed    UPPER GASTROINTESTINAL ENDOSCOPY  01/31/2018    [facility]   oozing blood of small ulcer in D1-D2 region, clot overlying ulcer, injected at base, endo clips placed    UPPER GASTROINTESTINAL ENDOSCOPY  03/14/2018    [facility]   previous ulcer almost healed    US GUIDED LYMPH NODE BIOPSY  5/2/2024       FAMILY HISTORY  Family History   Problem Relation Age of Onset    Ulcers Mother     Kidney failure Father     Hypertension Father     Hypertension Brother     No Known Problems Son     No Known Problems Son     Breast cancer Neg Hx     Ovarian cancer Neg Hx     Uterine cancer Neg Hx     Colon cancer Neg Hx     Malig Hyperthermia Neg Hx        SOCIAL HISTORY  Social History     Tobacco Use    Smoking status: Never     Passive exposure: Never    Smokeless tobacco: Never   Vaping Use    Vaping status: Never Used   Substance Use Topics    Alcohol use: No    Drug use: No       Allergies:  Patient has no known allergies.    MEDICATIONS:  Medications Prior to Admission   Medication Sig Dispense Refill Last Dose/Taking    aspirin 81 MG chewable tablet Chew 1 tablet Daily.    12/3/2024    levothyroxine (SYNTHROID, LEVOTHROID) 100 MCG tablet Take 1 tablet by mouth Daily.   12/3/2024    metoprolol succinate XL (TOPROL-XL) 50 MG 24 hr tablet Take 1 tablet by mouth Daily.   12/3/2024    omeprazole (priLOSEC) 40 MG capsule 1 capsule Daily.   12/3/2024    oxyCODONE-acetaminophen (PERCOCET)  MG per tablet Take 1 tablet by mouth Every 6 (Six) Hours As Needed for Moderate Pain.   12/3/2024    predniSONE (DELTASONE) 10 MG tablet 5pox2d/4pox2d/3pox2d/2pox2d/1pox2d with food. 30 tablet 0 12/3/2024    traMADol (ULTRAM) 50 MG tablet Take 1 tablet by mouth Every 6 (Six) Hours As Needed for Moderate Pain. 120 tablet 3 12/3/2024    loratadine (CLARITIN) 10 MG tablet Take 1 tablet by mouth Daily.   Unknown    LORazepam (ATIVAN) 0.5 MG tablet Take 1 tablet by mouth 3 (Three) Times a Day As Needed.   Unknown    pravastatin (PRAVACHOL) 40 MG tablet TAKE 1 TABLET BY MOUTH DAILY 90 tablet 3        Current Facility-Administered Medications:     sennosides-docusate (PERICOLACE) 8.6-50 MG per tablet 2 tablet, 2 tablet, Oral, BID PRN **AND** polyethylene glycol (MIRALAX) packet 17 g, 17 g, Oral, Daily PRN **AND** bisacodyl (DULCOLAX) EC tablet 5 mg, 5 mg, Oral, Daily PRN **AND** bisacodyl (DULCOLAX) suppository 10 mg, 10 mg, Rectal, Daily PRN, Petros Flores APRN    cetirizine (zyrTEC) tablet 10 mg, 10 mg, Oral, Daily, Petros Flores APRN, 10 mg at 12/04/24 1046    dexAMETHasone (DECADRON) injection 4 mg, 4 mg, Intravenous, Q6H, Petros Flores APRN, 4 mg at 12/04/24 1509    HYDROmorphone (DILAUDID) injection 0.5 mg, 0.5 mg, Intravenous, Q2H PRN, Petros Flores APRN, 0.5 mg at 12/03/24 2337    levothyroxine (SYNTHROID, LEVOTHROID) tablet 100 mcg, 100 mcg, Oral, Daily, Petros Flores APRN, 100 mcg at 12/04/24 1045    Lidocaine 4 % 2 patch, 2 patch, Transdermal, Q24H, Petros Flores APRN, 2 patch at 12/04/24 1046    LORazepam (ATIVAN) tablet 0.5 mg, 0.5 mg, Oral, TID PRN, Petros Flores APRN    methocarbamol (ROBAXIN) tablet  500 mg, 500 mg, Oral, 4x Daily, Petros Flores APRN, 500 mg at 12/04/24 1051    metoprolol succinate XL (TOPROL-XL) 24 hr tablet 50 mg, 50 mg, Oral, Daily, Petros Flores APRN, 50 mg at 12/04/24 1046    ondansetron (ZOFRAN) injection 4 mg, 4 mg, Intravenous, Q6H PRN, Cee Winters, APRN, 4 mg at 12/04/24 1045    oxyCODONE-acetaminophen (PERCOCET)  MG per tablet 1 tablet, 1 tablet, Oral, Q6H PRN, Petros Flores, APRN, 1 tablet at 12/04/24 1046    pantoprazole (PROTONIX) EC tablet 40 mg, 40 mg, Oral, Q AM, Petros Flores, APRN, 40 mg at 12/04/24 0642    pravastatin (PRAVACHOL) tablet 40 mg, 40 mg, Oral, Daily, Petros Flores APRN    [COMPLETED] Insert Peripheral IV, , , Once **AND** sodium chloride 0.9 % flush 10 mL, 10 mL, Intravenous, PRN, Petros Flores, APRN    sodium chloride 0.9 % flush 10 mL, 10 mL, Intravenous, Q12H, Petros Flores APRN, 10 mL at 12/03/24 2338    sodium chloride 0.9 % flush 10 mL, 10 mL, Intravenous, PRN, Petros Flores, APRN    sodium chloride 0.9 % infusion 40 mL, 40 mL, Intravenous, PRN, Petros Flores, APRN      COMORBID CONDITIONS:  Cancer including (primary or metastatic) and Hypertension      Review of Systems  Review of Systems   Constitutional:  Negative for chills and fever.   Gastrointestinal:         Denies bowel incontinence   Genitourinary:  Negative for difficulty urinating and dysuria.   Musculoskeletal:  Positive for back pain. Negative for neck pain.   Neurological:  Positive for weakness (BLE). Negative for numbness.   Psychiatric/Behavioral:  The patient is not nervous/anxious.      Objective     Physical Exam  Physical Exam  Vitals reviewed.   Constitutional:       Appearance: Normal appearance.   Pulmonary:      Effort: Pulmonary effort is normal.   Musculoskeletal:      Cervical back: Normal.      Thoracic back: Normal.      Lumbar back: Bony tenderness present. Negative right straight leg raise test and negative left straight leg raise test.      Comments: Negative Pinky test  bilaterally  Negative Fanslen's test bilaterally     Skin:     General: Skin is warm and dry.   Neurological:      Motor: Motor strength is normal.     Deep Tendon Reflexes:      Reflex Scores:       Tricep reflexes are 2+ on the right side and 2+ on the left side.       Bicep reflexes are 2+ on the right side and 2+ on the left side.       Brachioradialis reflexes are 2+ on the right side and 2+ on the left side.       Patellar reflexes are 2+ on the right side and 2+ on the left side.       Achilles reflexes are 2+ on the right side and 2+ on the left side.      Neurological Exam  Mental Status  Awake, alert and oriented to person, place and time.    Motor  Normal muscle bulk throughout. Normal muscle tone. Strength is 5/5 throughout all four extremities.    Sensory  Sensation is intact to light touch, pinprick, vibration and proprioception in all four extremities.    Reflexes                                            Right                      Left  Brachioradialis                    2+                         2+  Biceps                                 2+                         2+  Triceps                                2+                         2+  Patellar                                2+                         2+  Achilles                                2+                         2+    Right pathological reflexes: Alessio's absent. Ankle clonus absent.  Left pathological reflexes: Alessio's absent. Ankle clonus absent.    Gait    Gait not tested.        Results Review:    LABS:    Admission on 12/03/2024   Component Date Value Ref Range Status    Glucose 12/04/2024 193 (H)  65 - 99 mg/dL Final    BUN 12/04/2024 19  8 - 23 mg/dL Final    Creatinine 12/04/2024 0.73  0.57 - 1.00 mg/dL Final    Sodium 12/04/2024 132 (L)  136 - 145 mmol/L Final    Potassium 12/04/2024 4.5  3.5 - 5.2 mmol/L Final    Chloride 12/04/2024 99  98 - 107 mmol/L Final    CO2 12/04/2024 21.1 (L)  22.0 - 29.0 mmol/L Final    Calcium  12/04/2024 9.7  8.6 - 10.5 mg/dL Final    Total Protein 12/04/2024 7.3  6.0 - 8.5 g/dL Final    Albumin 12/04/2024 3.9  3.5 - 5.2 g/dL Final    ALT (SGPT) 12/04/2024 28  1 - 33 U/L Final    AST (SGOT) 12/04/2024 19  1 - 32 U/L Final    Alkaline Phosphatase 12/04/2024 130 (H)  39 - 117 U/L Final    Total Bilirubin 12/04/2024 0.4  0.0 - 1.2 mg/dL Final    Globulin 12/04/2024 3.4  gm/dL Final    A/G Ratio 12/04/2024 1.1  g/dL Final    BUN/Creatinine Ratio 12/04/2024 26.0 (H)  7.0 - 25.0 Final    Anion Gap 12/04/2024 11.9  5.0 - 15.0 mmol/L Final    eGFR 12/04/2024 85.9  >60.0 mL/min/1.73 Final    WBC 12/03/2024 7.27  3.40 - 10.80 10*3/mm3 Final    RBC 12/03/2024 4.97  3.77 - 5.28 10*6/mm3 Final    Hemoglobin 12/03/2024 14.9  12.0 - 15.9 g/dL Final    Hematocrit 12/03/2024 43.8  34.0 - 46.6 % Final    MCV 12/03/2024 88.1  79.0 - 97.0 fL Final    MCH 12/03/2024 30.0  26.6 - 33.0 pg Final    MCHC 12/03/2024 34.0  31.5 - 35.7 g/dL Final    RDW 12/03/2024 13.1  12.3 - 15.4 % Final    RDW-SD 12/03/2024 42.3  37.0 - 54.0 fl Final    MPV 12/03/2024 10.0  6.0 - 12.0 fL Final    Platelets 12/03/2024 322  140 - 450 10*3/mm3 Final    Neutrophil % 12/03/2024 75.3  42.7 - 76.0 % Final    Lymphocyte % 12/03/2024 17.3 (L)  19.6 - 45.3 % Final    Monocyte % 12/03/2024 5.9  5.0 - 12.0 % Final    Eosinophil % 12/03/2024 0.0 (L)  0.3 - 6.2 % Final    Basophil % 12/03/2024 0.1  0.0 - 1.5 % Final    Immature Grans % 12/03/2024 1.4 (H)  0.0 - 0.5 % Final    Neutrophils, Absolute 12/03/2024 5.47  1.70 - 7.00 10*3/mm3 Final    Lymphocytes, Absolute 12/03/2024 1.26  0.70 - 3.10 10*3/mm3 Final    Monocytes, Absolute 12/03/2024 0.43  0.10 - 0.90 10*3/mm3 Final    Eosinophils, Absolute 12/03/2024 0.00  0.00 - 0.40 10*3/mm3 Final    Basophils, Absolute 12/03/2024 0.01  0.00 - 0.20 10*3/mm3 Final    Immature Grans, Absolute 12/03/2024 0.10 (H)  0.00 - 0.05 10*3/mm3 Final    nRBC 12/03/2024 0.0  0.0 - 0.2 /100 WBC Final    WBC 12/04/2024 8.31  3.40 -  10.80 10*3/mm3 Final    RBC 12/04/2024 4.63  3.77 - 5.28 10*6/mm3 Final    Hemoglobin 12/04/2024 14.0  12.0 - 15.9 g/dL Final    Hematocrit 12/04/2024 41.1  34.0 - 46.6 % Final    MCV 12/04/2024 88.8  79.0 - 97.0 fL Final    MCH 12/04/2024 30.2  26.6 - 33.0 pg Final    MCHC 12/04/2024 34.1  31.5 - 35.7 g/dL Final    RDW 12/04/2024 13.0  12.3 - 15.4 % Final    RDW-SD 12/04/2024 41.9  37.0 - 54.0 fl Final    MPV 12/04/2024 9.6  6.0 - 12.0 fL Final    Platelets 12/04/2024 324  140 - 450 10*3/mm3 Final    Protime 12/04/2024 14.7 (H)  11.7 - 14.2 Seconds Final    INR 12/04/2024 1.13 (H)  0.90 - 1.10 Final    PTT 12/04/2024 25.0  22.7 - 35.4 seconds Final       DIAGNOSTICS:  MRI Lumbar Spine-    IMPRESSION:       1.  At L4-L5, anterior and posterior osteophyte formation.  Posterior   disc bulge and ligament of flavum thickening resulting in critical spinal   canal narrowing with compression of the cauda equina nerve roots.  Severe   right and moderate left neural foraminal narrowing.  Moderate facet   osteoarthrosis.  2.  At L2-L3, anterior and posterior osteophyte formation with severe   disc height loss.  Modic type I and Modic type II degenerative endplate   changes.  Retrolisthesis measuring 3 mm results in moderate-severe spinal   canal narrowing.  Moderate bilateral neural foraminal narrowing, mild   facet osteoarthrosis.  3.  At L3-L4, anterior and posterior osteophyte formation.    Intervertebral disc height loss.  Posterior disc bulge and ligamentum   flavum thickening severely narrow the spinal canal.  Moderate bilateral   neural foraminal narrowing.  Moderate facet osteoarthrosis.  4.  At L5-S1 anterior posterior osteophyte formation.  Intervertebral   disc height loss.  Posterior disc bulge measuring 4 mm is AP moderately   narrows the spinal canal.  Moderate bilateral neuroforaminal narrowing.    Mild facet osteoarthrosis.  5.  At T9-T10, posterior disc bulge measuring 4 mm AP indents the thecal   sac but  does not significantly narrow the spinal canal.  6.  At L1-L2, anterior and posterior osteophyte formation with   intervertebral disc height loss.  Retrolisthesis measuring 1 mm.    Posterior disc bulge measuring 3 mm is AP indents the thecal sac but does   not significantly narrow the spinal canal.  Moderate bilateral   neuroforaminal narrowing.    Results Review:   I reviewed the patient's new clinical results.  I personally viewed  the patient's MRI lumbar spine, it was also discussed with and reviewed by Dr. Terrazas    Vital Signs   Temp:  [98.5 °F (36.9 °C)-99 °F (37.2 °C)] 98.6 °F (37 °C)  Heart Rate:  [] 91  Resp:  [16-18] 18  BP: (133-180)/() 133/72      Assessment & Plan       Back pain      Problem List Items Addressed This Visit    None  Visit Diagnoses       Spinal stenosis of lumbar region with neurogenic claudication    -  Primary    Weakness of both lower extremities        Relevant Medications    methocarbamol (ROBAXIN) tablet 500 mg           74-year-old female who presents with ongoing and progressively worsening low back pain since October with pain that she describes as electrical shocks down both her legs.  Received LESI 10 days ago and did get some results with her back pain but no relief in her leg symptoms.  Dr. Terrazas reviewed her MRI patient does have stenosis at L2/3, more severe at L4/5 and L5-S1.  He feels that patient will need surgical intervention, it does not need to be urgent or during this admission.  He would like patient to get lumbar spine x-rays with flexion and extension along with scoliosis films.  He will review these and have the patient follow-up in the office next week to discuss treatment options.  She was recently prescribed a Medrol Dosepak by her primary care physician, would recommend that she continue that until completed.    PLAN:   -Pt can be discharged home once lumbar xrays have been completed  -Office will call to set up appointment for next  "week    I discussed the patient's findings and my recommendations with patient, family, nursing staff, primary care team, and Dr. Terrazas    During patient visit, I utilized appropriate personal protective equipment including gloves and mask.  Mask used was standard procedure mask. Appropriate PPE was worn during the entire visit.  Hand hygiene was completed before and after.     Irma Irene, APRN  12/04/24  10:14 EST    \"Dictated utilizing Dragon dictation\".      "

## 2024-12-05 ENCOUNTER — TRANSITIONAL CARE MANAGEMENT TELEPHONE ENCOUNTER (OUTPATIENT)
Dept: CALL CENTER | Facility: HOSPITAL | Age: 74
End: 2024-12-05
Payer: MEDICARE

## 2024-12-05 ENCOUNTER — TELEPHONE (OUTPATIENT)
Dept: NEUROSURGERY | Facility: CLINIC | Age: 74
End: 2024-12-05
Payer: MEDICARE

## 2024-12-05 ENCOUNTER — TELEPHONE (OUTPATIENT)
Dept: ONCOLOGY | Facility: CLINIC | Age: 74
End: 2024-12-05

## 2024-12-05 NOTE — TELEPHONE ENCOUNTER
PATIENT CALLED AND I INFORMED HER OF APPT DATE AND TIME.  SHE STATES SHE WILL SPEAK WITH HER  AND IF IT DOES NOT WORK SHE WILL CALL BACK 12/6/2024.    NO CALL BACK NEEDED AT THIS TIME

## 2024-12-05 NOTE — OUTREACH NOTE
Prep Survey      Flowsheet Row Responses   [facility] patient discharged from? [place]   Is LACE score < 7 ? Yes   Eligibility [facility]   Date of Admission 12/03/24   Date of Discharge 12/04/24   Discharge Disposition Home or Self Care   Discharge diagnosis Back pain, Spinal stenosis of lumbar region with neurogenic claudication   Does the patient have one of the following disease processes/diagnoses(primary or secondary)? Other   Does the patient have Home health ordered? No   Is there a DME ordered? No   Prep survey completed? Yes            Odalys Pruett Registered Nurse

## 2024-12-05 NOTE — TELEPHONE ENCOUNTER
Caller: Imani Shankar    Relationship: Self    Best call back number: [phone]    What is the best time to reach you: ANYTIME    Who are you requesting to speak with (clinical staff, provider,  specific staff member): FRONT OFFICE    What was the call regarding: PT IS REQUESTING A COPY OF HER PATH REPORT MAILED TO HER FOR HER INSURANCE, SHE IS REQUESTING REPORTS FROM 4-1-2024 AND 5-2-2024

## 2024-12-05 NOTE — OUTREACH NOTE
Call Center TCM Note      Flowsheet Row Responses   [facility] patient discharged from? [place]   Does the patient have one of the following disease processes/diagnoses(primary or secondary)? Other   TCM attempt successful? Yes   Call start time 1100   Call end time 1102   Discharge diagnosis Back pain, Spinal stenosis of lumbar region with neurogenic claudication   Meds reviewed with patient/caregiver? Yes   Is the patient having any side effects they believe may be caused by any medication additions or changes? No   Does the patient have all medications ordered at discharge? Yes   Is the patient taking all medications as directed (includes completed medication regime)? Yes   Comments PCP appt listed as 12/10/24 1 pm . Does not states HOSP DC FU appt but does meet TCM guidelines. TCM call complete.   Does the patient have an appointment with their PCP within 7-14 days of discharge? Yes   Has home health visited the patient within 72 hours of discharge? N/A   Psychosocial issues? No   Did the patient receive a copy of their discharge instructions? Yes   Nursing interventions Reviewed instructions with patient   What is the patient's perception of their health status since discharge? Improving   Is the patient/caregiver able to teach back signs and symptoms related to disease process for when to call PCP? Yes   Is the patient/caregiver able to teach back signs and symptoms related to disease process for when to call 911? Yes   Is the patient/caregiver able to teach back the hierarchy of who to call/visit for symptoms/problems? PCP, Specialist, Home health nurse, Urgent Care, ED, 911 Yes   TCM call completed? Yes   Wrap up additional comments Pt reports mild improvement. Still having back pain. Has meds as ordered and is aware to FU with neuro   Call end time 1102            Radha Watkins RN    12/5/2024, 11:03 EST

## 2024-12-06 NOTE — PROGRESS NOTES
Subjective   Patient ID: Imani Shankar is a 74 y.o. female is here today for follow-up for possible surgical discussion    This very nice lady is with her son.  I spoke to her other son by telephone.  He has a neuroradiologist in [place].  He is generally healthy.  A few months ago she was walking with her grandson and fell forward and had an abrasion on her face.  She seemed fine afterwards but about 2 weeks later she began having pain in her back and her legs that was quite severe.  Her primary care physician ordered a lumbar MRI and she was found to have severe spinal stenosis at L4-L5 and L5-S1.  She got better with an oral steroid pack but the pain recurred and she went on to have  an epidural block by Dr. Mcmahon at [facility] and felt worse afterwards.  She went to the emergency room at the suggestion of her physician son where she was admitted briefly.  Another MRI did not show anything like an epidural hematoma.  A pelvic MRI did show some degenerative hip disease but she had mainly pain in the back and down both buttocks and both legs and a sense of weakness in the leg including the calf weakness on the right described below.  No anterior thigh pain, no groin pain.    She had IV steroids when she was in the hospital and that improved her pain but she still has the weakness.  I think the pain is likely to recur anyway but the weakness is the most significant factor and for that reason I recommended moving forward with an open lumbar decompression at L4-L5 and L5-S1 with a possible discectomy on the right at L4-L5.  I do not think she needs to be fused and I do not think we need to address issues at L2-L3 and L3-L4.  She is generally healthy and really does not need cardiac clearance.  I think we need to move forward with this sooner rather than later although it is not a knocked down drag out emergency.  I did recommend moving forward next week.  She will let us know if she wants to move forward  in that timetable.    History of Present Illness    The following portions of the patient's history were reviewed and updated as appropriate: allergies, current medications, past family history, past medical history, past social history, past surgical history, and problem list.    Review of Systems   Constitutional:  Negative for fever.   Musculoskeletal:  Positive for back pain. Negative for gait problem.   Neurological:  Negative for weakness and numbness.   All other systems reviewed and are negative.      Objective   Physical Exam  Constitutional:       General: She is awake.      Appearance: She is well-developed.   HENT:      Head: Normocephalic and atraumatic.   Eyes:      General: Lids are normal.      Extraocular Movements: Extraocular movements intact.      Conjunctiva/sclera: Conjunctivae normal.      Pupils: Pupils are equal, round, and reactive to light.   Neck:      Vascular: No carotid bruit.   Neurological:      Mental Status: She is alert.      Coordination: Coordination is intact.      Deep Tendon Reflexes:      Reflex Scores:       Tricep reflexes are 2+ on the right side and 2+ on the left side.       Bicep reflexes are 2+ on the right side and 2+ on the left side.       Brachioradialis reflexes are 2+ on the right side and 2+ on the left side.       Patellar reflexes are 2+ on the right side and 2+ on the left side.       Achilles reflexes are 2+ on the right side and 2+ on the left side.  Psychiatric:         Speech: Speech normal.       Neurological Exam  Mental Status  Awake and alert. Oriented only to person, place, time and situation. Recent and remote memory are intact. Speech is normal. Language is fluent with no aphasia. Attention and concentration are normal. Fund of knowledge is appropriate for level of education.    Cranial Nerves  CN II: Visual acuity is normal. Visual fields full to confrontation.  CN III, IV, VI: Extraocular movements intact bilaterally. Normal lids and orbits  bilaterally. Pupils equal round and reactive to light bilaterally.  CN V: Facial sensation is normal.  CN VII: Full and symmetric facial movement.  CN IX, X: Palate elevates symmetrically. Normal gag reflex.  CN XI: Shoulder shrug strength is normal.  CN XII: Tongue midline without atrophy or fasciculations.    Motor  Normal muscle bulk throughout. Normal muscle tone.                                               Right                     Left  Rhomboids                            5                          5  Infraspinatus                          5                          5  Supraspinatus                       5                          5  Deltoid                                   5                          5   Biceps                                   5                          5  Brachioradialis                      5                          5   Triceps                                  5                          5   Pronator                                5                          5   Supinator                              5                           5   Wrist flexor                            5                          5   Wrist extensor                       5                          5   Finger flexor                          5                          5   Finger extensor                     5                          5   Interossei                              5                          5   Abductor pollicis brevis         5                          5   Flexor pollicis brevis             5                          5   Opponens pollicis                 5                          5  Extensor digitorum               5                          5  Abductor digiti minimi           5                          5   Abdominal                            5                          5  Glutei                                    5                          5  Hip abductor                         5                           5  Hip adductor                         5                          5   Iliopsoas                               5                          5   Quadriceps                           5                          5   Hamstring                             5                          5   Gastrocnemius                     3                           5   Anterior tibialis                      5                          5   Posterior tibialis                    5                          5   Peroneal                               5                          5  Ankle dorsiflexor                   5                          5  Ankle plantar flexor              5                           5  Extensor hallucis longus      5                           5    Sensory  Light touch is normal in upper and lower extremities. Proprioception is normal in upper and lower extremities.     Reflexes                                            Right                      Left  Brachioradialis                    2+                         2+  Biceps                                 2+                         2+  Triceps                                2+                         2+  Finger flex                           2+                         2+  Hamstring                            2+                         2+  Patellar                                2+                         2+  Achilles                                2+                         2+    Coordination    Finger-to-nose, rapid alternating movements and heel-to-shin normal bilaterally without dysmetria.    Gait  Casual gait is normal including stance, stride, and arm swing.Normal toe walking. Normal heel walking. Normal tandem gait.       Assessment & Plan   Independent Review of Radiographic Studies:      The lumbar MRI done on 12//24 was reviewed.  There is significant spinal stenosis at L2-L3 with a spondylolisthesis,   Moderate degree of stenosis at L3-L4, severe stenosis at L4-L5  with a superimposed disc protrusion mostly on the right, and severe stenosis at L5-S1 with superimposed disc protrusions bilaterally.  No instability on flexion-extension x-rays.  Agree with the report.    Medical Decision Making:      I described and recommended an open bilateral lumbar decompression/discectomy at L4-L5 and L5-S1..  The goal of surgery is relief of radiating leg pain with improvement of numbness, tingling, walking, and quality of life.  This will not help or hinder low back pain.  The risks include, but are not limited to, infection, hemorrhage on transfusion or reoperation, CSF leak requiring reoperation, incomplete relief of symptoms, potential need for additional surgeries in the future including a fusion, stroke, paralysis, coma, and death.  The patient would let us know when she wants to proceed.  She is generally healthy and really would not need cardiac clearance.    If she is decides to move forward with surgery then I will see her myself postoperatively 2 weeks afterwards.    Diagnoses and all orders for this visit:    1. Spinal stenosis of lumbar region with neurogenic claudication (Primary)  -     Case Request; Standing  -     ceFAZolin (ANCEF) 2 g in sodium chloride 0.9 % 100 mL IVPB  -     Case Request    2. Herniation of lumbar intervertebral disc with radiculopathy  -     Case Request; Standing  -     ceFAZolin (ANCEF) 2 g in sodium chloride 0.9 % 100 mL IVPB  -     Case Request    3. Degeneration of intervertebral disc of lumbar region with discogenic back pain and lower extremity pain  -     Case Request; Standing  -     ceFAZolin (ANCEF) 2 g in sodium chloride 0.9 % 100 mL IVPB  -     Case Request    4. Calf muscle weakness  -     Case Request; Standing  -     ceFAZolin (ANCEF) 2 g in sodium chloride 0.9 % 100 mL IVPB  -     Case Request    Other orders  -     Follow Anesthesia Guidelines / Protocol; Future  -     Follow Anesthesia Guidelines / Protocol; Standing  -     SCD  (Sequential Compression Device) - To Be Placed on Patient in Pre-Op; Standing  -     Verify / Perform Chlorhexidine Skin Prep; Standing  -     Provide Patient With Instructions on NPO Status; Future  -     Provide Chlorhexidine Skin Prep Wipes and Instructions; Future      Return for 2 weeks after surgery to see me.

## 2024-12-06 NOTE — H&P (VIEW-ONLY)
Subjective   Patient ID: Imani Shankar is a 74 y.o. female is here today for follow-up for possible surgical discussion    This very nice lady is with her son.  I spoke to her other son by telephone.  He has a neuroradiologist in [place].  He is generally healthy.  A few months ago she was walking with her grandson and fell forward and had an abrasion on her face.  She seemed fine afterwards but about 2 weeks later she began having pain in her back and her legs that was quite severe.  Her primary care physician ordered a lumbar MRI and she was found to have severe spinal stenosis at L4-L5 and L5-S1.  She got better with an oral steroid pack but the pain recurred and she went on to have  an epidural block by Dr. Mcmahon at [facility] and felt worse afterwards.  She went to the emergency room at the suggestion of her physician son where she was admitted briefly.  Another MRI did not show anything like an epidural hematoma.  A pelvic MRI did show some degenerative hip disease but she had mainly pain in the back and down both buttocks and both legs and a sense of weakness in the leg including the calf weakness on the right described below.  No anterior thigh pain, no groin pain.    She had IV steroids when she was in the hospital and that improved her pain but she still has the weakness.  I think the pain is likely to recur anyway but the weakness is the most significant factor and for that reason I recommended moving forward with an open lumbar decompression at L4-L5 and L5-S1 with a possible discectomy on the right at L4-L5.  I do not think she needs to be fused and I do not think we need to address issues at L2-L3 and L3-L4.  She is generally healthy and really does not need cardiac clearance.  I think we need to move forward with this sooner rather than later although it is not a knocked down drag out emergency.  I did recommend moving forward next week.  She will let us know if she wants to move forward  in that timetable.    History of Present Illness    The following portions of the patient's history were reviewed and updated as appropriate: allergies, current medications, past family history, past medical history, past social history, past surgical history, and problem list.    Review of Systems   Constitutional:  Negative for fever.   Musculoskeletal:  Positive for back pain. Negative for gait problem.   Neurological:  Negative for weakness and numbness.   All other systems reviewed and are negative.      Objective   Physical Exam  Constitutional:       General: She is awake.      Appearance: She is well-developed.   HENT:      Head: Normocephalic and atraumatic.   Eyes:      General: Lids are normal.      Extraocular Movements: Extraocular movements intact.      Conjunctiva/sclera: Conjunctivae normal.      Pupils: Pupils are equal, round, and reactive to light.   Neck:      Vascular: No carotid bruit.   Neurological:      Mental Status: She is alert.      Coordination: Coordination is intact.      Deep Tendon Reflexes:      Reflex Scores:       Tricep reflexes are 2+ on the right side and 2+ on the left side.       Bicep reflexes are 2+ on the right side and 2+ on the left side.       Brachioradialis reflexes are 2+ on the right side and 2+ on the left side.       Patellar reflexes are 2+ on the right side and 2+ on the left side.       Achilles reflexes are 2+ on the right side and 2+ on the left side.  Psychiatric:         Speech: Speech normal.       Neurological Exam  Mental Status  Awake and alert. Oriented only to person, place, time and situation. Recent and remote memory are intact. Speech is normal. Language is fluent with no aphasia. Attention and concentration are normal. Fund of knowledge is appropriate for level of education.    Cranial Nerves  CN II: Visual acuity is normal. Visual fields full to confrontation.  CN III, IV, VI: Extraocular movements intact bilaterally. Normal lids and orbits  bilaterally. Pupils equal round and reactive to light bilaterally.  CN V: Facial sensation is normal.  CN VII: Full and symmetric facial movement.  CN IX, X: Palate elevates symmetrically. Normal gag reflex.  CN XI: Shoulder shrug strength is normal.  CN XII: Tongue midline without atrophy or fasciculations.    Motor  Normal muscle bulk throughout. Normal muscle tone.                                               Right                     Left  Rhomboids                            5                          5  Infraspinatus                          5                          5  Supraspinatus                       5                          5  Deltoid                                   5                          5   Biceps                                   5                          5  Brachioradialis                      5                          5   Triceps                                  5                          5   Pronator                                5                          5   Supinator                              5                           5   Wrist flexor                            5                          5   Wrist extensor                       5                          5   Finger flexor                          5                          5   Finger extensor                     5                          5   Interossei                              5                          5   Abductor pollicis brevis         5                          5   Flexor pollicis brevis             5                          5   Opponens pollicis                 5                          5  Extensor digitorum               5                          5  Abductor digiti minimi           5                          5   Abdominal                            5                          5  Glutei                                    5                          5  Hip abductor                         5                           5  Hip adductor                         5                          5   Iliopsoas                               5                          5   Quadriceps                           5                          5   Hamstring                             5                          5   Gastrocnemius                     3                           5   Anterior tibialis                      5                          5   Posterior tibialis                    5                          5   Peroneal                               5                          5  Ankle dorsiflexor                   5                          5  Ankle plantar flexor              5                           5  Extensor hallucis longus      5                           5    Sensory  Light touch is normal in upper and lower extremities. Proprioception is normal in upper and lower extremities.     Reflexes                                            Right                      Left  Brachioradialis                    2+                         2+  Biceps                                 2+                         2+  Triceps                                2+                         2+  Finger flex                           2+                         2+  Hamstring                            2+                         2+  Patellar                                2+                         2+  Achilles                                2+                         2+    Coordination    Finger-to-nose, rapid alternating movements and heel-to-shin normal bilaterally without dysmetria.    Gait  Casual gait is normal including stance, stride, and arm swing.Normal toe walking. Normal heel walking. Normal tandem gait.       Assessment & Plan   Independent Review of Radiographic Studies:      The lumbar MRI done on 12//24 was reviewed.  There is significant spinal stenosis at L2-L3 with a spondylolisthesis,   Moderate degree of stenosis at L3-L4, severe stenosis at L4-L5  with a superimposed disc protrusion mostly on the right, and severe stenosis at L5-S1 with superimposed disc protrusions bilaterally.  No instability on flexion-extension x-rays.  Agree with the report.    Medical Decision Making:      I described and recommended an open bilateral lumbar decompression/discectomy at L4-L5 and L5-S1..  The goal of surgery is relief of radiating leg pain with improvement of numbness, tingling, walking, and quality of life.  This will not help or hinder low back pain.  The risks include, but are not limited to, infection, hemorrhage on transfusion or reoperation, CSF leak requiring reoperation, incomplete relief of symptoms, potential need for additional surgeries in the future including a fusion, stroke, paralysis, coma, and death.  The patient would let us know when she wants to proceed.  She is generally healthy and really would not need cardiac clearance.    If she is decides to move forward with surgery then I will see her myself postoperatively 2 weeks afterwards.    Diagnoses and all orders for this visit:    1. Spinal stenosis of lumbar region with neurogenic claudication (Primary)  -     Case Request; Standing  -     ceFAZolin (ANCEF) 2 g in sodium chloride 0.9 % 100 mL IVPB  -     Case Request    2. Herniation of lumbar intervertebral disc with radiculopathy  -     Case Request; Standing  -     ceFAZolin (ANCEF) 2 g in sodium chloride 0.9 % 100 mL IVPB  -     Case Request    3. Degeneration of intervertebral disc of lumbar region with discogenic back pain and lower extremity pain  -     Case Request; Standing  -     ceFAZolin (ANCEF) 2 g in sodium chloride 0.9 % 100 mL IVPB  -     Case Request    4. Calf muscle weakness  -     Case Request; Standing  -     ceFAZolin (ANCEF) 2 g in sodium chloride 0.9 % 100 mL IVPB  -     Case Request    Other orders  -     Follow Anesthesia Guidelines / Protocol; Future  -     Follow Anesthesia Guidelines / Protocol; Standing  -     SCD  (Sequential Compression Device) - To Be Placed on Patient in Pre-Op; Standing  -     Verify / Perform Chlorhexidine Skin Prep; Standing  -     Provide Patient With Instructions on NPO Status; Future  -     Provide Chlorhexidine Skin Prep Wipes and Instructions; Future      Return for 2 weeks after surgery to see me.

## 2024-12-10 ENCOUNTER — OFFICE VISIT (OUTPATIENT)
Age: 74
End: 2024-12-10
Payer: MEDICARE

## 2024-12-10 VITALS
HEIGHT: 61 IN | WEIGHT: 150 LBS | OXYGEN SATURATION: 97 % | TEMPERATURE: 97 F | RESPIRATION RATE: 14 BRPM | HEART RATE: 67 BPM | DIASTOLIC BLOOD PRESSURE: 70 MMHG | BODY MASS INDEX: 28.32 KG/M2 | SYSTOLIC BLOOD PRESSURE: 124 MMHG

## 2024-12-10 DIAGNOSIS — R29.898 BILATERAL LEG WEAKNESS: ICD-10-CM

## 2024-12-10 DIAGNOSIS — M54.16 LUMBAR RADICULOPATHY: Primary | ICD-10-CM

## 2024-12-10 RX ORDER — PREDNISONE 10 MG/1
TABLET ORAL
Qty: 42 TABLET | Refills: 0 | Status: SHIPPED | OUTPATIENT
Start: 2024-12-10

## 2024-12-10 RX ORDER — OXYCODONE AND ACETAMINOPHEN 5; 325 MG/1; MG/1
1 TABLET ORAL EVERY 6 HOURS PRN
Qty: 56 TABLET | Refills: 0 | Status: SHIPPED | OUTPATIENT
Start: 2024-12-10

## 2024-12-10 NOTE — PROGRESS NOTES
Transitional Care Follow Up Visit  Subjective     Imani Shankar is a 74 y.o. female who presents for a transitional care management visit.    Within 48 business hours after discharge our office contacted her via telephone to coordinate her care and needs.      I reviewed and discussed the details of that call along with the discharge summary, hospital problems, inpatient lab results, inpatient diagnostic studies, and consultation reports with Imani.     Current outpatient and discharge medications have been reconciled for the patient.  Reviewed by: Chang Carter MD          12/4/2024    10:47 PM   Date of TCM Phone Call   [facility]   Date of Admission 12/3/2024   Date of Discharge 12/4/2024   Discharge Disposition Home or Self Care     Risk for Readmission (LACE) Score: 5 (12/4/2024  6:00 AM)      History of Present Illness   Course During Hospital Stay:Pt went to ER with worsening lower back pain radiculopathy.  She had LESI approximately 10 days ago without significant improvement.     Neurosurgery was consulted.  MRI shows stenosis at L2-3, more severe at L4-5 and L5-S1.  Nonurgent surgical intervention is recommended.  She had flexion and extension x-rays along with scoliosis films prior to discharge.  Pt will follow up with neuro and  continue the steroid she was already prescribed by her primary care doctor. Robaxin was prescribed as needed.  Pt was discharged home discharged home.     The following portions of the patient's history were reviewed and updated as appropriate: allergies, current medications, past family history, past medical history, past social history, past surgical history, and problem list.    Review of Systems   Constitutional:  Negative for fever.   Cardiovascular:  Negative for chest pain.   Gastrointestinal:  Negative for abdominal pain.   Genitourinary:  Positive for pelvic pain. Negative for dysuria.   Musculoskeletal:  Positive for back pain.   Neurological:   "Positive for weakness. Negative for numbness.       Objective   /70 (BP Location: Right arm, Patient Position: Sitting, Cuff Size: Adult)   Pulse 67   Temp 97 °F (36.1 °C) (Temporal)   Resp 14   Ht 154.9 cm (60.98\")   Wt 68 kg (150 lb)   SpO2 97%   BMI 28.36 kg/m²   Physical Exam  Constitutional:       General: She is not in acute distress.     Appearance: Normal appearance. She is not ill-appearing, toxic-appearing or diaphoretic.   HENT:      Head: Normocephalic and atraumatic.      Right Ear: There is no impacted cerumen.      Left Ear: There is no impacted cerumen.      Nose: No congestion or rhinorrhea.      Mouth/Throat:      Pharynx: Oropharynx is clear. No oropharyngeal exudate or posterior oropharyngeal erythema.   Eyes:      General: No scleral icterus.        Right eye: No discharge.         Left eye: No discharge.      Extraocular Movements: Extraocular movements intact.      Conjunctiva/sclera: Conjunctivae normal.      Pupils: Pupils are equal, round, and reactive to light.   Cardiovascular:      Rate and Rhythm: Normal rate and regular rhythm.      Pulses: Normal pulses.      Heart sounds: Normal heart sounds.   Pulmonary:      Effort: Pulmonary effort is normal.      Breath sounds: Normal breath sounds.   Abdominal:      General: Abdomen is flat. Bowel sounds are normal.      Palpations: Abdomen is soft.   Musculoskeletal:         General: Normal range of motion.      Cervical back: Normal range of motion and neck supple.   Skin:     General: Skin is warm.   Neurological:      General: No focal deficit present.      Mental Status: She is alert and oriented to person, place, and time. Mental status is at baseline.      Motor: Weakness present.      Gait: Gait abnormal.   Psychiatric:         Mood and Affect: Mood normal.         Behavior: Behavior normal.         Thought Content: Thought content normal.         Judgment: Judgment normal.         Assessment & Plan   Problems Addressed this " Visit       Lumbar radiculopathy - Primary    Relevant Medications    oxyCODONE-acetaminophen (Percocet) 5-325 MG per tablet    predniSONE (DELTASONE) 10 MG tablet    Other Relevant Orders    Ambulatory Referral to Pain Management    Bilateral leg weakness    Relevant Medications    oxyCODONE-acetaminophen (Percocet) 5-325 MG per tablet    predniSONE (DELTASONE) 10 MG tablet    Other Relevant Orders    Ambulatory Referral to Pain Management     Diagnoses         Codes Comments    Lumbar radiculopathy    -  Primary ICD-10-CM: M54.16  ICD-9-CM: 724.4     Bilateral leg weakness     ICD-10-CM: R29.898  ICD-9-CM: 729.89

## 2024-12-11 ENCOUNTER — OFFICE VISIT (OUTPATIENT)
Dept: NEUROSURGERY | Facility: CLINIC | Age: 74
End: 2024-12-11
Payer: MEDICARE

## 2024-12-11 VITALS
RESPIRATION RATE: 20 BRPM | DIASTOLIC BLOOD PRESSURE: 76 MMHG | WEIGHT: 150 LBS | SYSTOLIC BLOOD PRESSURE: 134 MMHG | BODY MASS INDEX: 28.32 KG/M2 | HEIGHT: 61 IN

## 2024-12-11 DIAGNOSIS — M62.81 CALF MUSCLE WEAKNESS: ICD-10-CM

## 2024-12-11 DIAGNOSIS — M51.362 DEGENERATION OF INTERVERTEBRAL DISC OF LUMBAR REGION WITH DISCOGENIC BACK PAIN AND LOWER EXTREMITY PAIN: ICD-10-CM

## 2024-12-11 DIAGNOSIS — M48.062 SPINAL STENOSIS OF LUMBAR REGION WITH NEUROGENIC CLAUDICATION: Primary | ICD-10-CM

## 2024-12-11 DIAGNOSIS — M51.16 HERNIATION OF LUMBAR INTERVERTEBRAL DISC WITH RADICULOPATHY: ICD-10-CM

## 2024-12-11 PROCEDURE — 99214 OFFICE O/P EST MOD 30 MIN: CPT | Performed by: NEUROLOGICAL SURGERY

## 2024-12-11 PROCEDURE — 1159F MED LIST DOCD IN RCRD: CPT | Performed by: NEUROLOGICAL SURGERY

## 2024-12-11 PROCEDURE — 1160F RVW MEDS BY RX/DR IN RCRD: CPT | Performed by: NEUROLOGICAL SURGERY

## 2024-12-11 NOTE — LETTER
December 11, 2024     Chang Carter MD  [address]  [place]  [facility] [address]    Patient: Imani Shankar   YOB: 1950   Date of Visit: 12/11/2024     Dear Chang Carter MD:       Thank you for referring Imani Shankar to me for evaluation. Below are the relevant portions of my assessment and plan of care.    If you have questions, please do not hesitate to call me. I look forward to following Imani along with you.         Sincerely,        Franklyn Terrazas MD        CC: No Recipients    Franklyn Terrazas MD  12/11/24 1823  Sign when Signing Visit  Subjective  Patient ID: Imani Shankar is a 74 y.o. female is here today for follow-up for possible surgical discussion    This very nice lady is with her son.  I spoke to her other son by telephone.  He has a neuroradiologist in [place].  He is generally healthy.  A few months ago she was walking with her grandson and fell forward and had an abrasion on her face.  She seemed fine afterwards but about 2 weeks later she began having pain in her back and her legs that was quite severe.  Her primary care physician ordered a lumbar MRI and she was found to have severe spinal stenosis at L4-L5 and L5-S1.  She got better with an oral steroid pack but the pain recurred and she went on to have  an epidural block by Dr. Mcmahon at [facility] medicine and felt worse afterwards.  She went to the emergency room at the suggestion of her physician son where she was admitted briefly.  Another MRI did not show anything like an epidural hematoma.  A pelvic MRI did show some degenerative hip disease but she had mainly pain in the back and down both buttocks and both legs and a sense of weakness in the leg including the calf weakness on the right described below.  No anterior thigh pain, no groin pain.    She had IV steroids when she was in the hospital and that improved her pain but she still has the weakness.  I think the pain is likely to recur anyway but  the weakness is the most significant factor and for that reason I recommended moving forward with an open lumbar decompression at L4-L5 and L5-S1 with a possible discectomy on the right at L4-L5.  I do not think she needs to be fused and I do not think we need to address issues at L2-L3 and L3-L4.  She is generally healthy and really does not need cardiac clearance.  I think we need to move forward with this sooner rather than later although it is not a knocked down drag out emergency.  I did recommend moving forward next week.  She will let us know if she wants to move forward in that timetable.    History of Present Illness    The following portions of the patient's history were reviewed and updated as appropriate: allergies, current medications, past family history, past medical history, past social history, past surgical history, and problem list.    Review of Systems   Constitutional:  Negative for fever.   Musculoskeletal:  Positive for back pain. Negative for gait problem.   Neurological:  Negative for weakness and numbness.   All other systems reviewed and are negative.      Objective  Physical Exam  Constitutional:       General: She is awake.      Appearance: She is well-developed.   HENT:      Head: Normocephalic and atraumatic.   Eyes:      General: Lids are normal.      Extraocular Movements: Extraocular movements intact.      Conjunctiva/sclera: Conjunctivae normal.      Pupils: Pupils are equal, round, and reactive to light.   Neck:      Vascular: No carotid bruit.   Neurological:      Mental Status: She is alert.      Coordination: Coordination is intact.      Deep Tendon Reflexes:      Reflex Scores:       Tricep reflexes are 2+ on the right side and 2+ on the left side.       Bicep reflexes are 2+ on the right side and 2+ on the left side.       Brachioradialis reflexes are 2+ on the right side and 2+ on the left side.       Patellar reflexes are 2+ on the right side and 2+ on the left side.        Achilles reflexes are 2+ on the right side and 2+ on the left side.  Psychiatric:         Speech: Speech normal.       Neurological Exam  Mental Status  Awake and alert. Oriented only to person, place, time and situation. Recent and remote memory are intact. Speech is normal. Language is fluent with no aphasia. Attention and concentration are normal. Fund of knowledge is appropriate for level of education.    Cranial Nerves  CN II: Visual acuity is normal. Visual fields full to confrontation.  CN III, IV, VI: Extraocular movements intact bilaterally. Normal lids and orbits bilaterally. Pupils equal round and reactive to light bilaterally.  CN V: Facial sensation is normal.  CN VII: Full and symmetric facial movement.  CN IX, X: Palate elevates symmetrically. Normal gag reflex.  CN XI: Shoulder shrug strength is normal.  CN XII: Tongue midline without atrophy or fasciculations.    Motor  Normal muscle bulk throughout. Normal muscle tone.                                               Right                     Left  Rhomboids                            5                          5  Infraspinatus                          5                          5  Supraspinatus                       5                          5  Deltoid                                   5                          5   Biceps                                   5                          5  Brachioradialis                      5                          5   Triceps                                  5                          5   Pronator                                5                          5   Supinator                              5                           5   Wrist flexor                            5                          5   Wrist extensor                       5                          5   Finger flexor                          5                          5   Finger extensor                     5                          5   Interossei                               5                          5   Abductor pollicis brevis         5                          5   Flexor pollicis brevis             5                          5   Opponens pollicis                 5                          5  Extensor digitorum               5                          5  Abductor digiti minimi           5                          5   Abdominal                            5                          5  Glutei                                    5                          5  Hip abductor                         5                          5  Hip adductor                         5                          5   Iliopsoas                               5                          5   Quadriceps                           5                          5   Hamstring                             5                          5   Gastrocnemius                     3                           5   Anterior tibialis                      5                          5   Posterior tibialis                    5                          5   Peroneal                               5                          5  Ankle dorsiflexor                   5                          5  Ankle plantar flexor              5                           5  Extensor hallucis longus      5                           5    Sensory  Light touch is normal in upper and lower extremities. Proprioception is normal in upper and lower extremities.     Reflexes                                            Right                      Left  Brachioradialis                    2+                         2+  Biceps                                 2+                         2+  Triceps                                2+                         2+  Finger flex                           2+                         2+  Hamstring                            2+                         2+  Patellar                                2+                          2+  Achilles                                2+                         2+    Coordination    Finger-to-nose, rapid alternating movements and heel-to-shin normal bilaterally without dysmetria.    Gait  Casual gait is normal including stance, stride, and arm swing.Normal toe walking. Normal heel walking. Normal tandem gait.       Assessment & Plan  Independent Review of Radiographic Studies:      The lumbar MRI done on 12//24 was reviewed.  There is significant spinal stenosis at L2-L3 with a spondylolisthesis,   Moderate degree of stenosis at L3-L4, severe stenosis at L4-L5 with a superimposed disc protrusion mostly on the right, and severe stenosis at L5-S1 with superimposed disc protrusions bilaterally.  No instability on flexion-extension x-rays.  Agree with the report.    Medical Decision Making:      I described and recommended an open bilateral lumbar decompression/discectomy at L4-L5 and L5-S1..  The goal of surgery is relief of radiating leg pain with improvement of numbness, tingling, walking, and quality of life.  This will not help or hinder low back pain.  The risks include, but are not limited to, infection, hemorrhage on transfusion or reoperation, CSF leak requiring reoperation, incomplete relief of symptoms, potential need for additional surgeries in the future including a fusion, stroke, paralysis, coma, and death.  The patient would let us know when she wants to proceed.  She is generally healthy and really would not need cardiac clearance.    If she is decides to move forward with surgery then I will see her myself postoperatively 2 weeks afterwards.    Diagnoses and all orders for this visit:    1. Spinal stenosis of lumbar region with neurogenic claudication (Primary)  -     Case Request; Standing  -     ceFAZolin (ANCEF) 2 g in sodium chloride 0.9 % 100 mL IVPB  -     Case Request    2. Herniation of lumbar intervertebral disc with radiculopathy  -     Case Request; Standing  -     ceFAZolin  (ANCEF) 2 g in sodium chloride 0.9 % 100 mL IVPB  -     Case Request    3. Degeneration of intervertebral disc of lumbar region with discogenic back pain and lower extremity pain  -     Case Request; Standing  -     ceFAZolin (ANCEF) 2 g in sodium chloride 0.9 % 100 mL IVPB  -     Case Request    4. Calf muscle weakness  -     Case Request; Standing  -     ceFAZolin (ANCEF) 2 g in sodium chloride 0.9 % 100 mL IVPB  -     Case Request    Other orders  -     Follow Anesthesia Guidelines / Protocol; Future  -     Follow Anesthesia Guidelines / Protocol; Standing  -     SCD (Sequential Compression Device) - To Be Placed on Patient in Pre-Op; Standing  -     Verify / Perform Chlorhexidine Skin Prep; Standing  -     Provide Patient With Instructions on NPO Status; Future  -     Provide Chlorhexidine Skin Prep Wipes and Instructions; Future      Return for 2 weeks after surgery to see me.

## 2024-12-12 ENCOUNTER — TELEPHONE (OUTPATIENT)
Dept: NEUROSURGERY | Facility: CLINIC | Age: 74
End: 2024-12-12
Payer: MEDICARE

## 2024-12-12 NOTE — TELEPHONE ENCOUNTER
Hub staff attempted to follow warm transfer process and was unsuccessful     Caller: NOEMI LIMA    Relationship to patient: FILIPE Fontenot call back number: [phone]    Patient is needing: PATIENTS SON NOEMI LIMA CALLED WANTING TO TALK TO SOMEBODY ABOUT HIS MOMS UPCOMING SX ON 12.17.24 W DR MARKS - ALSO WANT TO MAKE SURE WE SET UP HOME HEALTH AND PT     PATIENT IS CURRENTLY TAKING A BABY ASPIRIN AND STEROIDS - WANT TO KNOW IF SHE NEEDS TO QUIT TAKING THESE BEFORE HER SX    WHAT TYPE PRE OP PRE SHE NEEDS    PLEASE RETURN CALL  THANK YOU

## 2024-12-12 NOTE — TELEPHONE ENCOUNTER
Infant is attempting breastfeeding, with good latch observed. Parents are attentive to infants needs. Adequate voids and stools for age. No void since circumcision. Meeting expected goals.    Spoke to patient/son and answered questions pertaining to surgery.  Both understood.

## 2024-12-13 ENCOUNTER — PRE-ADMISSION TESTING (OUTPATIENT)
Dept: PREADMISSION TESTING | Facility: HOSPITAL | Age: 74
End: 2024-12-13
Payer: MEDICARE

## 2024-12-13 VITALS
HEIGHT: 60 IN | RESPIRATION RATE: 18 BRPM | HEART RATE: 74 BPM | SYSTOLIC BLOOD PRESSURE: 175 MMHG | OXYGEN SATURATION: 97 % | WEIGHT: 148.9 LBS | TEMPERATURE: 98.9 F | DIASTOLIC BLOOD PRESSURE: 89 MMHG | BODY MASS INDEX: 29.23 KG/M2

## 2024-12-13 LAB
QT INTERVAL: 370 MS
QTC INTERVAL: 391 MS

## 2024-12-13 PROCEDURE — 93010 ELECTROCARDIOGRAM REPORT: CPT | Performed by: INTERNAL MEDICINE

## 2024-12-13 PROCEDURE — 93005 ELECTROCARDIOGRAM TRACING: CPT

## 2024-12-13 NOTE — DISCHARGE INSTRUCTIONS
Take the following medications the morning of surgery: LEVOTHYROXINE, OMEPRAZOLE, METOPROLOL      If you are on prescription narcotic pain medication to control your pain you may also take that medication the morning of surgery.      General Instructions:     Do not eat solid food after midnight the night before surgery.  Clear liquids day of surgery are allowed but must be stopped at least two hours before your hospital arrival time.       Allowed clear liquids      Water, sodas, and tea or coffee with no cream or milk added.       12 to 20 ounces of a clear liquid that contains carbohydrates is recommended.  If non-diabetic, have Gatorade or Powerade.  If diabetic, have G2 or Powerade Zero.     Do not have liquids red in color.  Do not consume chicken, beef, pork or vegetable broth or bouillon cubes of any variety as they are not considered clear liquids and are not allowed.      Infants may have breast milk up to four hours before surgery.  Infants drinking formula may drink formula up to six hours before surgery.   Patients who avoid smoking, chewing tobacco and alcohol for 4 weeks prior to surgery have a reduced risk of post-operative complications.  Quit smoking as many days before surgery as you can.  Do not smoke, use chewing tobacco or drink alcohol the day of surgery.   If applicable bring your C-PAP/ BI-PAP machine in with you to preop day of surgery.  Bring any papers given to you in the doctor’s office.  Wear clean comfortable clothes.  Do not wear contact lenses, false eyelashes or make-up.  Bring a case for your glasses.   Bring crutches or walker if applicable.  Remove all piercings.  Leave jewelry and any other valuables at home.  Hair extensions with metal clips must be removed prior to surgery.  The Pre-Admission Testing nurse will instruct you to bring medications if unable to obtain an accurate list in Pre-Admission Testing.        If you were given a blood bank ID arm band remember to bring it  with you the day of surgery.    Preventing a Surgical Site Infection:  For 2 to 3 days before surgery, avoid shaving with a razor because the razor can irritate skin and make it easier to develop an infection.    Any areas of open skin can increase the risk of a post-operative wound infection by allowing bacteria to enter and travel throughout the body.  Notify your surgeon if you have any skin wounds / rashes even if it is not near the expected surgical site.  The area will need assessed to determine if surgery should be delayed until it is healed.  The night prior to surgery shower using a fresh bar of anti-bacterial soap (such as Dial) and clean washcloth.  Sleep in a clean bed with clean clothing.  Do not allow pets to sleep with you.  Shower on the morning of surgery using a fresh bar of anti-bacterial soap (such as Dial) and clean washcloth.  Dry with a clean towel and dress in clean clothing.  Ask your surgeon if you will be receiving antibiotics prior to surgery.  Make sure you, your family, and all healthcare providers clean their hands with soap and water or an alcohol based hand  before caring for you or your wound.    Day of surgery:  Your arrival time is approximately two hours before your scheduled surgery time.  Please note if you have an early arrival time the surgery doors do not open before 5:00 AM.  Upon arrival, a Pre-op nurse and Anesthesiologist will review your health history, obtain vital signs, and answer questions you may have.  The only belongings needed at this time will be a list of your home medications and if applicable your C-PAP/BI-PAP machine.  A Pre-op nurse will start an IV and you may receive medication in preparation for surgery, including something to help you relax.     Please be aware that surgery does come with discomfort.  We want to make every effort to control your discomfort so please discuss any uncontrolled symptoms with your nurse.   Your doctor will most  likely have prescribed pain medications.      If you are going home after surgery you will receive individualized written care instructions before being discharged.  A responsible adult must drive you to and from the hospital on the day of your surgery and ideally stay with you through the night.   .  Discharge prescriptions can be filled by the hospital pharmacy during regular pharmacy hours.  If you are having surgery late in the day/evening your prescription may be e-prescribed to your pharmacy.  Please verify your pharmacy hours or chose a 24 hour pharmacy to avoid not having access to your prescription because your pharmacy has closed for the day.    If you are staying overnight following surgery, you will be transported to your hospital room following the recovery period.  [facility] has all private rooms.    If you have any questions please call Pre-Admission Testing at [phone].  Deductibles and co-payments are collected on the day of service. Please be prepared to pay the required co-pay, deductible or deposit on the day of service as defined by your plan.    Call your surgeon immediately if you experience any of the following symptoms:  Sore Throat  Shortness of Breath or difficulty breathing  Cough  Chills  Body soreness or muscle pain  Headache  Fever  New loss of taste or smell  Do not arrive for your surgery ill.  Your procedure will need to be rescheduled to another time.  You will need to call your physician before the day of surgery to avoid any unnecessary exposure to hospital staff as well as other patients.          CHLORHEXIDINE CLOTH INSTRUCTIONS  The morning of surgery follow these instructions using the Chlorhexidine cloths you've been given.  These steps reduce bacteria on the body.  Do not use the cloths near your eyes, ears mouth, genitalia or on open wounds.  Throw the cloths away after use but do not try to flush them down a toilet.      Open and remove one cloth at a  time from the package.    Leave the cloth unfolded and begin the bathing.  Massage the skin with the cloths using gentle pressure to remove bacteria.  Do not scrub harshly.   Follow the steps below with one 2% CHG cloth per area (6 total cloths).  One cloth for neck, shoulders and chest.  One cloth for both arms, hands, fingers and underarms (do underarms last).  One cloth for the abdomen followed by groin.  One cloth for right leg and foot including between the toes.  One cloth for left leg and foot including between the toes.  The last cloth is to be used for the back of the neck, back and buttocks.    Allow the CHG to air dry 3 minutes on the skin which will give it time to work and decrease the chance of irritation.  The skin may feel sticky until it is dry.  Do not rinse with water or any other liquid or you will lose the beneficial effects of the CHG.  If mild skin irritation occurs, do rinse the skin to remove the CHG.  Report this to the nurse at time of admission.  Do not apply lotions, creams, ointments, deodorants or perfumes after using the clothes. Dress in clean clothes before coming to the hospital.

## 2024-12-17 ENCOUNTER — ANESTHESIA (OUTPATIENT)
Dept: PERIOP | Facility: HOSPITAL | Age: 74
End: 2024-12-17
Payer: MEDICARE

## 2024-12-17 ENCOUNTER — APPOINTMENT (OUTPATIENT)
Dept: GENERAL RADIOLOGY | Facility: HOSPITAL | Age: 74
End: 2024-12-17
Payer: MEDICARE

## 2024-12-17 ENCOUNTER — HOSPITAL ENCOUNTER (OUTPATIENT)
Facility: HOSPITAL | Age: 74
Discharge: HOME | End: 2024-12-19
Attending: NEUROLOGICAL SURGERY | Admitting: NEUROLOGICAL SURGERY
Payer: MEDICARE

## 2024-12-17 ENCOUNTER — ANESTHESIA EVENT (OUTPATIENT)
Dept: PERIOP | Facility: HOSPITAL | Age: 74
End: 2024-12-17
Payer: MEDICARE

## 2024-12-17 DIAGNOSIS — M48.062 SPINAL STENOSIS OF LUMBAR REGION WITH NEUROGENIC CLAUDICATION: ICD-10-CM

## 2024-12-17 DIAGNOSIS — M62.81 CALF MUSCLE WEAKNESS: ICD-10-CM

## 2024-12-17 DIAGNOSIS — M51.362 DEGENERATION OF INTERVERTEBRAL DISC OF LUMBAR REGION WITH DISCOGENIC BACK PAIN AND LOWER EXTREMITY PAIN: ICD-10-CM

## 2024-12-17 DIAGNOSIS — M51.16 HERNIATION OF LUMBAR INTERVERTEBRAL DISC WITH RADICULOPATHY: ICD-10-CM

## 2024-12-17 PROBLEM — M48.061 SPINAL STENOSIS OF LUMBAR REGION: Status: ACTIVE | Noted: 2024-12-17

## 2024-12-17 PROCEDURE — 25010000002 MAGNESIUM SULFATE PER 500 MG OF MAGNESIUM: Performed by: NURSE ANESTHETIST, CERTIFIED REGISTERED

## 2024-12-17 PROCEDURE — 25010000002 LIDOCAINE 2% SOLUTION: Performed by: NURSE ANESTHETIST, CERTIFIED REGISTERED

## 2024-12-17 PROCEDURE — 25010000002 CEFAZOLIN PER 500 MG: Performed by: NEUROLOGICAL SURGERY

## 2024-12-17 PROCEDURE — 25010000002 ONDANSETRON PER 1 MG: Performed by: NURSE ANESTHETIST, CERTIFIED REGISTERED

## 2024-12-17 PROCEDURE — A9270 NON-COVERED ITEM OR SERVICE: HCPCS | Performed by: NEUROLOGICAL SURGERY

## 2024-12-17 PROCEDURE — 76000 FLUOROSCOPY <1 HR PHYS/QHP: CPT

## 2024-12-17 PROCEDURE — 25810000003 LACTATED RINGERS PER 1000 ML: Performed by: ANESTHESIOLOGY

## 2024-12-17 PROCEDURE — 25010000002 FENTANYL CITRATE (PF) 50 MCG/ML SOLUTION: Performed by: NURSE ANESTHETIST, CERTIFIED REGISTERED

## 2024-12-17 PROCEDURE — 63710000001 PRAVASTATIN 40 MG TABLET: Performed by: NEUROLOGICAL SURGERY

## 2024-12-17 PROCEDURE — 25010000002 DEXAMETHASONE SODIUM PHOSPHATE 20 MG/5ML SOLUTION: Performed by: NURSE ANESTHETIST, CERTIFIED REGISTERED

## 2024-12-17 PROCEDURE — A9270 NON-COVERED ITEM OR SERVICE: HCPCS | Performed by: NURSE ANESTHETIST, CERTIFIED REGISTERED

## 2024-12-17 PROCEDURE — 25810000003 LACTATED RINGERS PER 1000 ML: Performed by: NEUROLOGICAL SURGERY

## 2024-12-17 PROCEDURE — 25010000002 PROPOFOL 200 MG/20ML EMULSION: Performed by: NURSE ANESTHETIST, CERTIFIED REGISTERED

## 2024-12-17 PROCEDURE — 25010000002 HYDROMORPHONE PER 4 MG: Performed by: NURSE ANESTHETIST, CERTIFIED REGISTERED

## 2024-12-17 PROCEDURE — 25010000002 METHOCARBAMOL 1000 MG/10ML SOLUTION: Performed by: NURSE ANESTHETIST, CERTIFIED REGISTERED

## 2024-12-17 PROCEDURE — 25010000002 SUGAMMADEX 200 MG/2ML SOLUTION: Performed by: NURSE ANESTHETIST, CERTIFIED REGISTERED

## 2024-12-17 PROCEDURE — 63710000001 OXYCODONE-ACETAMINOPHEN 7.5-325 MG TABLET: Performed by: NURSE ANESTHETIST, CERTIFIED REGISTERED

## 2024-12-17 PROCEDURE — 25010000002 MIDAZOLAM PER 1 MG: Performed by: ANESTHESIOLOGY

## 2024-12-17 PROCEDURE — 63047 LAM FACETEC & FORAMOT LUMBAR: CPT | Performed by: NEUROLOGICAL SURGERY

## 2024-12-17 PROCEDURE — 63710000001 METHOCARBAMOL 750 MG TABLET: Performed by: NEUROLOGICAL SURGERY

## 2024-12-17 PROCEDURE — C1713 ANCHOR/SCREW BN/BN,TIS/BN: HCPCS | Performed by: NEUROLOGICAL SURGERY

## 2024-12-17 PROCEDURE — 25010000002 BUPIVACAINE 0.25 % SOLUTION: Performed by: NEUROLOGICAL SURGERY

## 2024-12-17 PROCEDURE — 63048 LAM FACETEC &FORAMOT EA ADDL: CPT | Performed by: NEUROLOGICAL SURGERY

## 2024-12-17 DEVICE — SSC BONE WAX
Type: IMPLANTABLE DEVICE | Site: SPINE LUMBAR | Status: FUNCTIONAL
Brand: SSC BONE WAX

## 2024-12-17 DEVICE — FLOSEAL WITH RECOTHROM - 10ML.
Type: IMPLANTABLE DEVICE | Site: SPINE LUMBAR | Status: FUNCTIONAL
Brand: FLOSEAL HEMOSTATIC MATRIX

## 2024-12-17 DEVICE — AVITENE ULTRAFOAM, 8 CM X 12.5 CM (3-1/8" X 5"), 100 SQ CM
Type: IMPLANTABLE DEVICE | Site: SPINE LUMBAR | Status: FUNCTIONAL
Brand: AVITENE

## 2024-12-17 RX ORDER — NALOXONE HCL 0.4 MG/ML
0.1 VIAL (ML) INJECTION
Status: DISCONTINUED | OUTPATIENT
Start: 2024-12-17 | End: 2024-12-19 | Stop reason: HOSPADM

## 2024-12-17 RX ORDER — PROMETHAZINE HYDROCHLORIDE 25 MG/1
25 SUPPOSITORY RECTAL ONCE AS NEEDED
Status: DISCONTINUED | OUTPATIENT
Start: 2024-12-17 | End: 2024-12-17 | Stop reason: HOSPADM

## 2024-12-17 RX ORDER — METHOCARBAMOL 100 MG/ML
INJECTION, SOLUTION INTRAMUSCULAR; INTRAVENOUS AS NEEDED
Status: DISCONTINUED | OUTPATIENT
Start: 2024-12-17 | End: 2024-12-17 | Stop reason: SURG

## 2024-12-17 RX ORDER — SODIUM CHLORIDE 9 MG/ML
30 INJECTION, SOLUTION INTRAVENOUS CONTINUOUS PRN
Status: DISCONTINUED | OUTPATIENT
Start: 2024-12-17 | End: 2024-12-19 | Stop reason: HOSPADM

## 2024-12-17 RX ORDER — HYDROMORPHONE HCL/0.9% NACL/PF 10 MG/50ML
PATIENT CONTROLLED ANALGESIA SYRINGE INTRAVENOUS CONTINUOUS
Status: DISCONTINUED | OUTPATIENT
Start: 2024-12-17 | End: 2024-12-19 | Stop reason: HOSPADM

## 2024-12-17 RX ORDER — FENTANYL CITRATE 50 UG/ML
50 INJECTION, SOLUTION INTRAMUSCULAR; INTRAVENOUS
Status: DISCONTINUED | OUTPATIENT
Start: 2024-12-17 | End: 2024-12-17 | Stop reason: HOSPADM

## 2024-12-17 RX ORDER — FENTANYL CITRATE 50 UG/ML
INJECTION, SOLUTION INTRAMUSCULAR; INTRAVENOUS AS NEEDED
Status: DISCONTINUED | OUTPATIENT
Start: 2024-12-17 | End: 2024-12-17 | Stop reason: SURG

## 2024-12-17 RX ORDER — ONDANSETRON 2 MG/ML
4 INJECTION INTRAMUSCULAR; INTRAVENOUS EVERY 6 HOURS PRN
Status: DISCONTINUED | OUTPATIENT
Start: 2024-12-17 | End: 2024-12-19 | Stop reason: HOSPADM

## 2024-12-17 RX ORDER — HYDROCODONE BITARTRATE AND ACETAMINOPHEN 5; 325 MG/1; MG/1
1 TABLET ORAL ONCE AS NEEDED
Status: DISCONTINUED | OUTPATIENT
Start: 2024-12-17 | End: 2024-12-17 | Stop reason: HOSPADM

## 2024-12-17 RX ORDER — ATROPINE SULFATE 0.4 MG/ML
0.4 INJECTION, SOLUTION INTRAMUSCULAR; INTRAVENOUS; SUBCUTANEOUS ONCE AS NEEDED
Status: DISCONTINUED | OUTPATIENT
Start: 2024-12-17 | End: 2024-12-17 | Stop reason: HOSPADM

## 2024-12-17 RX ORDER — PRAVASTATIN SODIUM 40 MG
40 TABLET ORAL DAILY
Status: DISCONTINUED | OUTPATIENT
Start: 2024-12-17 | End: 2024-12-19 | Stop reason: HOSPADM

## 2024-12-17 RX ORDER — PROPOFOL 10 MG/ML
INJECTION, EMULSION INTRAVENOUS AS NEEDED
Status: DISCONTINUED | OUTPATIENT
Start: 2024-12-17 | End: 2024-12-17 | Stop reason: SURG

## 2024-12-17 RX ORDER — SODIUM CHLORIDE 0.9 % (FLUSH) 0.9 %
3-10 SYRINGE (ML) INJECTION AS NEEDED
Status: DISCONTINUED | OUTPATIENT
Start: 2024-12-17 | End: 2024-12-17 | Stop reason: HOSPADM

## 2024-12-17 RX ORDER — ACETAMINOPHEN 325 MG/1
650 TABLET ORAL EVERY 4 HOURS PRN
Status: DISCONTINUED | OUTPATIENT
Start: 2024-12-17 | End: 2024-12-19 | Stop reason: HOSPADM

## 2024-12-17 RX ORDER — SODIUM CHLORIDE 0.9 % (FLUSH) 0.9 %
10 SYRINGE (ML) INJECTION EVERY 12 HOURS SCHEDULED
Status: DISCONTINUED | OUTPATIENT
Start: 2024-12-17 | End: 2024-12-19 | Stop reason: HOSPADM

## 2024-12-17 RX ORDER — ROCURONIUM BROMIDE 10 MG/ML
INJECTION, SOLUTION INTRAVENOUS AS NEEDED
Status: DISCONTINUED | OUTPATIENT
Start: 2024-12-17 | End: 2024-12-17 | Stop reason: SURG

## 2024-12-17 RX ORDER — LEVOTHYROXINE SODIUM 100 UG/1
100 TABLET ORAL
Status: DISCONTINUED | OUTPATIENT
Start: 2024-12-18 | End: 2024-12-19 | Stop reason: HOSPADM

## 2024-12-17 RX ORDER — HYDROMORPHONE HYDROCHLORIDE 1 MG/ML
0.5 INJECTION, SOLUTION INTRAMUSCULAR; INTRAVENOUS; SUBCUTANEOUS
Status: DISCONTINUED | OUTPATIENT
Start: 2024-12-17 | End: 2024-12-17 | Stop reason: HOSPADM

## 2024-12-17 RX ORDER — MIDAZOLAM HYDROCHLORIDE 1 MG/ML
0.5 INJECTION, SOLUTION INTRAMUSCULAR; INTRAVENOUS
Status: DISCONTINUED | OUTPATIENT
Start: 2024-12-17 | End: 2024-12-17 | Stop reason: HOSPADM

## 2024-12-17 RX ORDER — POLYETHYLENE GLYCOL 3350 17 G/17G
17 POWDER, FOR SOLUTION ORAL DAILY PRN
Status: DISCONTINUED | OUTPATIENT
Start: 2024-12-17 | End: 2024-12-19 | Stop reason: HOSPADM

## 2024-12-17 RX ORDER — AMOXICILLIN 250 MG
2 CAPSULE ORAL 2 TIMES DAILY PRN
Status: DISCONTINUED | OUTPATIENT
Start: 2024-12-17 | End: 2024-12-19 | Stop reason: HOSPADM

## 2024-12-17 RX ORDER — OXYCODONE AND ACETAMINOPHEN 7.5; 325 MG/1; MG/1
1 TABLET ORAL EVERY 4 HOURS PRN
Status: DISCONTINUED | OUTPATIENT
Start: 2024-12-17 | End: 2024-12-17 | Stop reason: HOSPADM

## 2024-12-17 RX ORDER — OXYCODONE AND ACETAMINOPHEN 5; 325 MG/1; MG/1
1 TABLET ORAL EVERY 4 HOURS PRN
Status: DISCONTINUED | OUTPATIENT
Start: 2024-12-17 | End: 2024-12-17

## 2024-12-17 RX ORDER — EPHEDRINE SULFATE 50 MG/ML
5 INJECTION, SOLUTION INTRAVENOUS ONCE AS NEEDED
Status: DISCONTINUED | OUTPATIENT
Start: 2024-12-17 | End: 2024-12-17 | Stop reason: HOSPADM

## 2024-12-17 RX ORDER — ONDANSETRON 2 MG/ML
INJECTION INTRAMUSCULAR; INTRAVENOUS AS NEEDED
Status: DISCONTINUED | OUTPATIENT
Start: 2024-12-17 | End: 2024-12-17 | Stop reason: SURG

## 2024-12-17 RX ORDER — BISACODYL 5 MG/1
5 TABLET, DELAYED RELEASE ORAL DAILY PRN
Status: DISCONTINUED | OUTPATIENT
Start: 2024-12-17 | End: 2024-12-19 | Stop reason: HOSPADM

## 2024-12-17 RX ORDER — DEXMEDETOMIDINE HYDROCHLORIDE 100 UG/ML
INJECTION, SOLUTION INTRAVENOUS AS NEEDED
Status: DISCONTINUED | OUTPATIENT
Start: 2024-12-17 | End: 2024-12-17 | Stop reason: SURG

## 2024-12-17 RX ORDER — NALOXONE HCL 0.4 MG/ML
0.2 VIAL (ML) INJECTION AS NEEDED
Status: DISCONTINUED | OUTPATIENT
Start: 2024-12-17 | End: 2024-12-17 | Stop reason: HOSPADM

## 2024-12-17 RX ORDER — HYDRALAZINE HYDROCHLORIDE 20 MG/ML
5 INJECTION INTRAMUSCULAR; INTRAVENOUS
Status: DISCONTINUED | OUTPATIENT
Start: 2024-12-17 | End: 2024-12-17 | Stop reason: HOSPADM

## 2024-12-17 RX ORDER — BUPIVACAINE HYDROCHLORIDE AND EPINEPHRINE 2.5; 5 MG/ML; UG/ML
INJECTION, SOLUTION EPIDURAL; INFILTRATION; INTRACAUDAL; PERINEURAL AS NEEDED
Status: DISCONTINUED | OUTPATIENT
Start: 2024-12-17 | End: 2024-12-17 | Stop reason: HOSPADM

## 2024-12-17 RX ORDER — ACETAMINOPHEN 650 MG/1
650 SUPPOSITORY RECTAL EVERY 4 HOURS PRN
Status: DISCONTINUED | OUTPATIENT
Start: 2024-12-17 | End: 2024-12-19 | Stop reason: HOSPADM

## 2024-12-17 RX ORDER — ONDANSETRON 4 MG/1
4 TABLET, ORALLY DISINTEGRATING ORAL EVERY 6 HOURS PRN
Status: DISCONTINUED | OUTPATIENT
Start: 2024-12-17 | End: 2024-12-19 | Stop reason: HOSPADM

## 2024-12-17 RX ORDER — METHOCARBAMOL 750 MG/1
750 TABLET, FILM COATED ORAL 4 TIMES DAILY PRN
Status: DISCONTINUED | OUTPATIENT
Start: 2024-12-17 | End: 2024-12-18

## 2024-12-17 RX ORDER — METOPROLOL SUCCINATE 50 MG/1
50 TABLET, EXTENDED RELEASE ORAL DAILY
Status: DISCONTINUED | OUTPATIENT
Start: 2024-12-18 | End: 2024-12-19 | Stop reason: HOSPADM

## 2024-12-17 RX ORDER — LIDOCAINE HYDROCHLORIDE 20 MG/ML
INJECTION, SOLUTION INFILTRATION; PERINEURAL AS NEEDED
Status: DISCONTINUED | OUTPATIENT
Start: 2024-12-17 | End: 2024-12-17 | Stop reason: SURG

## 2024-12-17 RX ORDER — SODIUM CHLORIDE, SODIUM LACTATE, POTASSIUM CHLORIDE, CALCIUM CHLORIDE 600; 310; 30; 20 MG/100ML; MG/100ML; MG/100ML; MG/100ML
9 INJECTION, SOLUTION INTRAVENOUS CONTINUOUS
Status: ACTIVE | OUTPATIENT
Start: 2024-12-17 | End: 2024-12-18

## 2024-12-17 RX ORDER — IPRATROPIUM BROMIDE AND ALBUTEROL SULFATE 2.5; .5 MG/3ML; MG/3ML
3 SOLUTION RESPIRATORY (INHALATION) ONCE AS NEEDED
Status: DISCONTINUED | OUTPATIENT
Start: 2024-12-17 | End: 2024-12-17 | Stop reason: HOSPADM

## 2024-12-17 RX ORDER — SODIUM CHLORIDE 0.9 % (FLUSH) 0.9 %
3 SYRINGE (ML) INJECTION EVERY 12 HOURS SCHEDULED
Status: DISCONTINUED | OUTPATIENT
Start: 2024-12-17 | End: 2024-12-17 | Stop reason: HOSPADM

## 2024-12-17 RX ORDER — METHOCARBAMOL 100 MG/ML
500 INJECTION, SOLUTION INTRAMUSCULAR; INTRAVENOUS ONCE
Status: DISCONTINUED | OUTPATIENT
Start: 2024-12-17 | End: 2024-12-17

## 2024-12-17 RX ORDER — FLUMAZENIL 0.1 MG/ML
0.2 INJECTION INTRAVENOUS AS NEEDED
Status: DISCONTINUED | OUTPATIENT
Start: 2024-12-17 | End: 2024-12-17 | Stop reason: HOSPADM

## 2024-12-17 RX ORDER — PANTOPRAZOLE SODIUM 40 MG/1
40 TABLET, DELAYED RELEASE ORAL
Status: DISCONTINUED | OUTPATIENT
Start: 2024-12-18 | End: 2024-12-19 | Stop reason: HOSPADM

## 2024-12-17 RX ORDER — SODIUM CHLORIDE, SODIUM LACTATE, POTASSIUM CHLORIDE, CALCIUM CHLORIDE 600; 310; 30; 20 MG/100ML; MG/100ML; MG/100ML; MG/100ML
80 INJECTION, SOLUTION INTRAVENOUS CONTINUOUS
Status: DISCONTINUED | OUTPATIENT
Start: 2024-12-17 | End: 2024-12-18

## 2024-12-17 RX ORDER — SODIUM CHLORIDE 9 MG/ML
40 INJECTION, SOLUTION INTRAVENOUS AS NEEDED
Status: DISCONTINUED | OUTPATIENT
Start: 2024-12-17 | End: 2024-12-19 | Stop reason: HOSPADM

## 2024-12-17 RX ORDER — DEXAMETHASONE SODIUM PHOSPHATE 4 MG/ML
INJECTION, SOLUTION INTRA-ARTICULAR; INTRALESIONAL; INTRAMUSCULAR; INTRAVENOUS; SOFT TISSUE AS NEEDED
Status: DISCONTINUED | OUTPATIENT
Start: 2024-12-17 | End: 2024-12-17 | Stop reason: SURG

## 2024-12-17 RX ORDER — SODIUM CHLORIDE 0.9 % (FLUSH) 0.9 %
10 SYRINGE (ML) INJECTION AS NEEDED
Status: DISCONTINUED | OUTPATIENT
Start: 2024-12-17 | End: 2024-12-19 | Stop reason: HOSPADM

## 2024-12-17 RX ORDER — ACETAMINOPHEN 500 MG
1000 TABLET ORAL ONCE
Status: COMPLETED | OUTPATIENT
Start: 2024-12-17 | End: 2024-12-17

## 2024-12-17 RX ORDER — BUPIVACAINE HYDROCHLORIDE 2.5 MG/ML
INJECTION, SOLUTION INFILTRATION; PERINEURAL AS NEEDED
Status: DISCONTINUED | OUTPATIENT
Start: 2024-12-17 | End: 2024-12-17 | Stop reason: HOSPADM

## 2024-12-17 RX ORDER — MAGNESIUM SULFATE HEPTAHYDRATE 500 MG/ML
INJECTION, SOLUTION INTRAMUSCULAR; INTRAVENOUS AS NEEDED
Status: DISCONTINUED | OUTPATIENT
Start: 2024-12-17 | End: 2024-12-17 | Stop reason: SURG

## 2024-12-17 RX ORDER — ACETAMINOPHEN 160 MG/5ML
650 SOLUTION ORAL EVERY 4 HOURS PRN
Status: DISCONTINUED | OUTPATIENT
Start: 2024-12-17 | End: 2024-12-19 | Stop reason: HOSPADM

## 2024-12-17 RX ORDER — PHENYLEPHRINE HCL IN 0.9% NACL 1 MG/10 ML
SYRINGE (ML) INTRAVENOUS AS NEEDED
Status: DISCONTINUED | OUTPATIENT
Start: 2024-12-17 | End: 2024-12-17 | Stop reason: SURG

## 2024-12-17 RX ORDER — LABETALOL HYDROCHLORIDE 5 MG/ML
5 INJECTION, SOLUTION INTRAVENOUS
Status: DISCONTINUED | OUTPATIENT
Start: 2024-12-17 | End: 2024-12-17 | Stop reason: HOSPADM

## 2024-12-17 RX ORDER — ONDANSETRON 2 MG/ML
4 INJECTION INTRAMUSCULAR; INTRAVENOUS ONCE AS NEEDED
Status: COMPLETED | OUTPATIENT
Start: 2024-12-17 | End: 2024-12-17

## 2024-12-17 RX ORDER — PROMETHAZINE HYDROCHLORIDE 25 MG/1
25 TABLET ORAL ONCE AS NEEDED
Status: DISCONTINUED | OUTPATIENT
Start: 2024-12-17 | End: 2024-12-17 | Stop reason: HOSPADM

## 2024-12-17 RX ORDER — DIPHENHYDRAMINE HYDROCHLORIDE 50 MG/ML
12.5 INJECTION INTRAMUSCULAR; INTRAVENOUS
Status: DISCONTINUED | OUTPATIENT
Start: 2024-12-17 | End: 2024-12-17 | Stop reason: HOSPADM

## 2024-12-17 RX ORDER — FAMOTIDINE 10 MG/ML
20 INJECTION, SOLUTION INTRAVENOUS ONCE
Status: COMPLETED | OUTPATIENT
Start: 2024-12-17 | End: 2024-12-17

## 2024-12-17 RX ORDER — BISACODYL 10 MG
10 SUPPOSITORY, RECTAL RECTAL DAILY PRN
Status: DISCONTINUED | OUTPATIENT
Start: 2024-12-17 | End: 2024-12-19 | Stop reason: HOSPADM

## 2024-12-17 RX ORDER — CETIRIZINE HYDROCHLORIDE 10 MG/1
10 TABLET ORAL DAILY PRN
Status: DISCONTINUED | OUTPATIENT
Start: 2024-12-17 | End: 2024-12-19 | Stop reason: HOSPADM

## 2024-12-17 RX ADMIN — ACETAMINOPHEN 1000 MG: 500 TABLET ORAL at 12:44

## 2024-12-17 RX ADMIN — SODIUM CHLORIDE, POTASSIUM CHLORIDE, SODIUM LACTATE AND CALCIUM CHLORIDE 80 ML/HR: 600; 310; 30; 20 INJECTION, SOLUTION INTRAVENOUS at 20:56

## 2024-12-17 RX ADMIN — SODIUM CHLORIDE, POTASSIUM CHLORIDE, SODIUM LACTATE AND CALCIUM CHLORIDE: 600; 310; 30; 20 INJECTION, SOLUTION INTRAVENOUS at 15:22

## 2024-12-17 RX ADMIN — FENTANYL CITRATE 50 MCG: 50 INJECTION, SOLUTION INTRAMUSCULAR; INTRAVENOUS at 16:37

## 2024-12-17 RX ADMIN — SUGAMMADEX 200 MG: 100 INJECTION, SOLUTION INTRAVENOUS at 15:36

## 2024-12-17 RX ADMIN — Medication 100 MCG: at 15:02

## 2024-12-17 RX ADMIN — MAGNESIUM SULFATE HEPTAHYDRATE 2 G: 500 INJECTION, SOLUTION INTRAMUSCULAR; INTRAVENOUS at 13:57

## 2024-12-17 RX ADMIN — DEXAMETHASONE SODIUM PHOSPHATE 8 MG: 4 INJECTION, SOLUTION INTRAMUSCULAR; INTRAVENOUS at 13:57

## 2024-12-17 RX ADMIN — FAMOTIDINE 20 MG: 10 INJECTION INTRAVENOUS at 12:48

## 2024-12-17 RX ADMIN — Medication 100 MCG: at 14:41

## 2024-12-17 RX ADMIN — HYDROMORPHONE HYDROCHLORIDE 0.5 MG: 1 INJECTION, SOLUTION INTRAMUSCULAR; INTRAVENOUS; SUBCUTANEOUS at 17:59

## 2024-12-17 RX ADMIN — ONDANSETRON 4 MG: 2 INJECTION INTRAMUSCULAR; INTRAVENOUS at 13:57

## 2024-12-17 RX ADMIN — ONDANSETRON 4 MG: 2 INJECTION, SOLUTION INTRAMUSCULAR; INTRAVENOUS at 16:56

## 2024-12-17 RX ADMIN — Medication 100 MCG: at 15:17

## 2024-12-17 RX ADMIN — PRAVASTATIN SODIUM 40 MG: 40 TABLET ORAL at 20:55

## 2024-12-17 RX ADMIN — LIDOCAINE HYDROCHLORIDE 60 MG: 20 INJECTION, SOLUTION INFILTRATION; PERINEURAL at 13:42

## 2024-12-17 RX ADMIN — Medication: at 17:18

## 2024-12-17 RX ADMIN — FENTANYL CITRATE 50 MCG: 50 INJECTION, SOLUTION INTRAMUSCULAR; INTRAVENOUS at 14:07

## 2024-12-17 RX ADMIN — Medication 100 MCG: at 15:24

## 2024-12-17 RX ADMIN — Medication 100 MCG: at 13:56

## 2024-12-17 RX ADMIN — Medication 20 MG: at 14:07

## 2024-12-17 RX ADMIN — Medication 20 MG: at 15:02

## 2024-12-17 RX ADMIN — Medication 10 MG: at 15:23

## 2024-12-17 RX ADMIN — SODIUM CHLORIDE, POTASSIUM CHLORIDE, SODIUM LACTATE AND CALCIUM CHLORIDE 9 ML/HR: 600; 310; 30; 20 INJECTION, SOLUTION INTRAVENOUS at 13:01

## 2024-12-17 RX ADMIN — MIDAZOLAM 0.5 MG: 1 INJECTION INTRAMUSCULAR; INTRAVENOUS at 12:49

## 2024-12-17 RX ADMIN — DEXMEDETOMIDINE HCL 4 MCG: 100 INJECTION INTRAVENOUS at 15:20

## 2024-12-17 RX ADMIN — HYDROMORPHONE HYDROCHLORIDE 0.5 MG: 1 INJECTION, SOLUTION INTRAMUSCULAR; INTRAVENOUS; SUBCUTANEOUS at 16:42

## 2024-12-17 RX ADMIN — OXYCODONE AND ACETAMINOPHEN 1 TABLET: 7.5; 325 TABLET ORAL at 16:53

## 2024-12-17 RX ADMIN — PROPOFOL 150 MG: 10 INJECTION, EMULSION INTRAVENOUS at 13:42

## 2024-12-17 RX ADMIN — FENTANYL CITRATE 50 MCG: 50 INJECTION, SOLUTION INTRAMUSCULAR; INTRAVENOUS at 16:56

## 2024-12-17 RX ADMIN — DEXMEDETOMIDINE HCL 4 MCG: 100 INJECTION INTRAVENOUS at 15:10

## 2024-12-17 RX ADMIN — Medication 100 MCG: at 14:29

## 2024-12-17 RX ADMIN — METHOCARBAMOL 750 MG: 750 TABLET ORAL at 20:55

## 2024-12-17 RX ADMIN — SODIUM CHLORIDE 2 G: 900 INJECTION INTRAVENOUS at 13:26

## 2024-12-17 RX ADMIN — FENTANYL CITRATE 50 MCG: 50 INJECTION, SOLUTION INTRAMUSCULAR; INTRAVENOUS at 16:32

## 2024-12-17 RX ADMIN — METHOCARBAMOL 1000 MG: 1000 INJECTION, SOLUTION INTRAMUSCULAR; INTRAVENOUS at 15:10

## 2024-12-17 RX ADMIN — Medication 100 MCG: at 14:20

## 2024-12-17 RX ADMIN — ROCURONIUM BROMIDE 50 MG: 10 INJECTION, SOLUTION INTRAVENOUS at 13:42

## 2024-12-17 NOTE — ANESTHESIA POSTPROCEDURE EVALUATION
"Patient: Imani Shankar    Procedure Summary       Date: 12/17/24 Room / Location: [facility] OR [address] [place] [address] MAIN OR    Anesthesia Start: 1335 Anesthesia Stop: 1557    Procedure: Open bilateral lumbar decompression/discectomy lumbar four/five, lumbar five/sacral one (Spine Lumbar) Diagnosis:       Spinal stenosis of lumbar region with neurogenic claudication      Herniation of lumbar intervertebral disc with radiculopathy      Degeneration of intervertebral disc of lumbar region with discogenic back pain and lower extremity pain      Calf muscle weakness      (Spinal stenosis of lumbar region with neurogenic claudication [M48.062])      (Herniation of lumbar intervertebral disc with radiculopathy [M51.16])      (Degeneration of intervertebral disc of lumbar region with discogenic back pain and lower extremity pain [M51.362])      (Calf muscle weakness [M62.81])    Surgeons: Franklyn Terrazas MD Provider: Aric Hale MD    Anesthesia Type: general ASA Status: 3            Anesthesia Type: general    Vitals  Vitals Value Taken Time   /80 12/17/24 1745   Temp 36.4 °C (97.5 °F) 12/17/24 1554   Pulse 70 12/17/24 1753   Resp 10 12/17/24 1715   SpO2 100 % 12/17/24 1753   Vitals shown include unfiled device data.        Post Anesthesia Care and Evaluation    Patient location during evaluation: PACU  Patient participation: complete - patient participated  Level of consciousness: awake and alert  Pain management: adequate    Airway patency: patent  Anesthetic complications: No anesthetic complications  PONV Status: controlled  Cardiovascular status: acceptable and hemodynamically stable  Respiratory status: acceptable  Hydration status: acceptable    Comments: /81   Pulse 68   Temp 36.4 °C (97.5 °F) (Oral)   Resp 10   Ht 152 cm (59.84\")   Wt 67.4 kg (148 lb 9.4 oz)   LMP  (LMP Unknown)   SpO2 98%   BMI 29.17 kg/m²     "

## 2024-12-17 NOTE — ANESTHESIA PREPROCEDURE EVALUATION
Anesthesia Evaluation     no history of anesthetic complications:   NPO Solid Status: > 8 hours  NPO Liquid Status: > 2 hours           Airway   Mallampati: II  Neck ROM: full  no difficulty expected  Dental - normal exam     Pulmonary - negative pulmonary ROS and normal exam   (-) COPD, asthma, sleep apnea, not a smoker    PE comment: nonlabored  Cardiovascular - normal exam  Exercise tolerance: good (4-7 METS)    Rhythm: regular  Rate: normal    (+) hypertension, hyperlipidemia  (-) valvular problems/murmurs, past MI, CAD, dysrhythmias, angina      Neuro/Psych  (+) TIA (2019--L weakness that fully resolved), weakness, numbness  (-) seizures, CVA    ROS Comment: Meningioma (HCC)  Spinal stenosis of lumbar region with neurogenic claudication    GI/Hepatic/Renal/Endo    (+) GERD, PUD, GI bleeding , thyroid problem hypothyroidism  (-) liver disease, no renal disease, diabetes    Musculoskeletal     (+) arthralgias, back pain, chronic pain  Abdominal    Substance History      OB/GYN          Other   arthritis,   history of cancer (cholangiocarcinoma; small lymphocytic lymphoma)                Anesthesia Plan    ASA 3     general     intravenous induction     Anesthetic plan, risks, benefits, and alternatives have been provided, discussed and informed consent has been obtained with: patient.    CODE STATUS:

## 2024-12-17 NOTE — OP NOTE
Preoperative diagnosis: 1. Lumbar spinal stenosis L4/5, L5/S1 with bilateral neurogenic claudication; 2. Superimposed central L5/S1 herniated disc with bilateral radiculopathy; 3. Bilateral leg weakness    Postoperative diagnosis: Same as above    Procedures performed: Open bilateral lumbar decompression L4/5, L5/S1 with bilateral L5/S1 discectomy    Spinal Surgery Levels Completed:2 Levels     Surgeon: Mk    First Assistant: Shiloh Najera  (She greatly assisted in the exposure, visualization of neural structures, control of bleeding, retraction, and closure of the incision. Her skilled assistance was necessary for the success of this case.)     Anesthesia: GET    EBL: 50 cc    Complications: none    Specimen sent: none    Drains: 7 mm flat MAI epidural drain    Findings: severe stenosis, particularly at L4/5; central disc herniation L5/S1    Postoperative condition: good    Indications for the operation: Patient is a 74-year-old female with a several month history of initially low back pain and bilateral buttock and leg pain.  She began also developing weakness and had an epidural block which did not help any of her pain.  Ironically with IV steroids her pain got better but her weakness persisted and actually progressed.  It was related to severe spinal stenosis particular at L4-L5 and L5-S1.  She did not have any symptoms suggestive of an L2-3 or L3-L4 metallic, only L5-S1 and L4-L5 so I focused on those 2 levels and brought her to the operating room for bilateral discectomy and decompression.  I did not think she needed to be fused although she was aware that that might be necessary in the future.    Informed consent: She understood that the goal of surgery is relief of radiating leg pain with improvement of numbness, tingling, walking, and quality of life.  This will not help or hinder low back pain.  The risks include, but are not limited to, infection, hemorrhage on transfusion or reoperation, CSF leak  requiring reoperation, incomplete relief of symptoms, potential need for additional surgeries in the future including a fusion, stroke, paralysis, coma, and death.  The patient agrees to proceed.     Details of the operation: The patient was taken the operating room and remained in her gurney in the supine position.  After induction and endotracheal ovation, she got 2 g of Kefzol as per the SCIP protocol.  No Cooney was needed.  SCDs were placed.  Venous access was secured.  She was rolled over the prone position on a radiolucent Brian spinal table.  All pressure points were padded include the brachial plexus.  We brought in the C arm and marked out the incision in the midline from L4-S1.  Lumbar region was then prepped and draped in the usual sterile fashion.  We did a surgical timeout.  I injected a total of 60 cc of quarter percent Marcaine with epinephrine to the paraspinous musculature bilaterally at L4-L5 and L5-S1.  A linear incision was made at L4-L5 and L5-S1.  Hemostasis was obtained with monopolar cautery.  Dissection was taken all the way down to the lumbar fascia which divided to the left and to the right at L4-L5 and L5-S1.  A Bustos periosteal was used to strip the paraspinous muscular bilaterally.  Self-retaining tractors were placed.  Initial x-ray showed towel clips at L4-L5 and L5-S1 and a later x-ray showed a needle in the L5-S1 disc space.  The spinous processes were removed at L4-L5 and S1.  The microscope was draped and brought into the field.  Its use was essential to performance of microneurosurgical technique.  Using a 5 mm round bur on the electric "[employer] drill I did a central laminectomy all the way down to the ligamentum flavum at L4-L5 and L5-S1.  I switched over to a 3 mm matchstick and completed the laminectomy and did a medial facetectomy and foraminotomies with 2 and 3 mm anchor Cloward punches as well as the drill decompressing the L5 and the S1 roots bilaterally.  There were  considerably compressed particular L4-L5 with a spinal stenosis.  There was a disc protrusion at L4-L5 but I did not think that that was the source of any major issues.  I decompressed L5-S1 and the S1 nerve roots on both sides were compressed by a central disc herniation which I did a bilateral partial discectomy enough to decompress further the L5 roots.  I made sure that there were no free fragments lying around.  But both S1's in both L5's nerve roots were totally mobile and pulsatile.  No CSF leakage was seen.  Antibiotic irrigation was used.  Floseal was used.  A 7 Root 5 Brian-Bose drain was left in the epidural space and exit through separate stab incision secured to skin with 2-0 silk.  Approximately with 0 Vicryl interrupted suture.  Subcutaneous layer was closed with 2-0 interrupted Vicryl suture.  The skin was closed with a running 4-0 Vicryl subcuticular.  Steri-Strips and a clean dry dressing were placed.  The patient Toller procedure well she was rolled over the supine position extubated taken recovery room satisfactory condition.

## 2024-12-17 NOTE — ANESTHESIA PROCEDURE NOTES
Airway  Urgency: elective    Date/Time: 12/17/2024 1:45 PM  Airway not difficult    General Information and Staff    Patient location during procedure: OR  Anesthesiologist: Aric Hale MD  CRNA/CAA: Ivania Langston CRNA    Indications and Patient Condition  Indications for airway management: airway protection    Preoxygenated: yes  MILS maintained throughout  Mask difficulty assessment: 1 - vent by mask    Final Airway Details  Final airway type: endotracheal airway      Successful airway: ETT  Cuffed: yes   Successful intubation technique: direct laryngoscopy  Facilitating devices/methods: intubating stylet  Endotracheal tube insertion site: oral  Blade: Catrina  Blade size: 3  ETT size (mm): 7.0  Cormack-Lehane Classification: grade I - full view of glottis  Placement verified by: chest auscultation, capnometry and palpation of cuff   Cuff volume (mL): 10  Measured from: lips  ETT/EBT  to lips (cm): 21  Number of attempts at approach: 1  Assessment: lips, teeth, and gum same as pre-op and atraumatic intubation

## 2024-12-17 NOTE — BRIEF OP NOTE
LUMBAR LAMINECTOMY DISCECTOMY DECOMPRESSION POSTERIOR 1-2 LEVELS  Progress Note    Imani Shankar  12/17/2024    Pre-op Diagnosis:   Spinal stenosis of lumbar region with neurogenic claudication [M48.062]  Herniation of lumbar intervertebral disc with radiculopathy [M51.16]  Degeneration of intervertebral disc of lumbar region with discogenic back pain and lower extremity pain [M51.362]  Calf muscle weakness [M62.81]       Post-Op Diagnosis Codes:     * Spinal stenosis of lumbar region with neurogenic claudication [M48.062]     * Herniation of lumbar intervertebral disc with radiculopathy [M51.16]     * Degeneration of intervertebral disc of lumbar region with discogenic back pain and lower extremity pain [M51.362]     * Calf muscle weakness [M62.81]    Procedure/CPT® Codes:        Procedure(s):  Open bilateral lumbar decompression/discectomy lumbar four/five, lumbar five/sacral one              Surgeon(s):  Franklyn Terrazas MD    Anesthesia: General    Staff:   Circulator: Lobo Torres RN  Scrub Person: Shavonne Eckert Glenn A  Assistant: Shiloh Najera CSA  Assistant: Shiloh Najera CSA      Estimated Blood Loss: 50 mL    Urine Voided: * No values recorded between 12/17/2024  1:35 PM and 12/17/2024  3:25 PM *    Specimens:                None          Drains:   Closed/Suction Drain 1 Inferior;Right;Medial Back Bulb 7 Fr. (Active)       Findings: severe stenosis, particularly at L4/5, superimposed central disc herniation L5/S1        Complications: none    Assistant: Shiloh Najera CSA  was responsible for performing the following activities: Retraction, Suction, Irrigation, Suturing, Closing, and Placing Dressing and their skilled assistance was necessary for the success of this case.    Franklyn Terrazas MD     Date: 12/17/2024  Time: 15:25 EST

## 2024-12-18 LAB
BASOPHILS # BLD AUTO: 0.01 10*3/MM3 (ref 0–0.2)
BASOPHILS NFR BLD AUTO: 0.1 % (ref 0–1.5)
DEPRECATED RDW RBC AUTO: 47.1 FL (ref 37–54)
EOSINOPHIL # BLD AUTO: 0 10*3/MM3 (ref 0–0.4)
EOSINOPHIL NFR BLD AUTO: 0 % (ref 0.3–6.2)
ERYTHROCYTE [DISTWIDTH] IN BLOOD BY AUTOMATED COUNT: 13.6 % (ref 12.3–15.4)
HCT VFR BLD AUTO: 40.5 % (ref 34–46.6)
HGB BLD-MCNC: 12.6 G/DL (ref 12–15.9)
IMM GRANULOCYTES # BLD AUTO: 0.03 10*3/MM3 (ref 0–0.05)
IMM GRANULOCYTES NFR BLD AUTO: 0.3 % (ref 0–0.5)
LYMPHOCYTES # BLD AUTO: 1.24 10*3/MM3 (ref 0.7–3.1)
LYMPHOCYTES NFR BLD AUTO: 13.2 % (ref 19.6–45.3)
MCH RBC QN AUTO: 29.3 PG (ref 26.6–33)
MCHC RBC AUTO-ENTMCNC: 31.1 G/DL (ref 31.5–35.7)
MCV RBC AUTO: 94.2 FL (ref 79–97)
MONOCYTES # BLD AUTO: 1.25 10*3/MM3 (ref 0.1–0.9)
MONOCYTES NFR BLD AUTO: 13.3 % (ref 5–12)
NEUTROPHILS NFR BLD AUTO: 6.89 10*3/MM3 (ref 1.7–7)
NEUTROPHILS NFR BLD AUTO: 73.1 % (ref 42.7–76)
NRBC BLD AUTO-RTO: 0 /100 WBC (ref 0–0.2)
PLATELET # BLD AUTO: 185 10*3/MM3 (ref 140–450)
PMV BLD AUTO: 9.4 FL (ref 6–12)
RBC # BLD AUTO: 4.3 10*6/MM3 (ref 3.77–5.28)
WBC NRBC COR # BLD AUTO: 9.42 10*3/MM3 (ref 3.4–10.8)

## 2024-12-18 PROCEDURE — 85025 COMPLETE CBC W/AUTO DIFF WBC: CPT

## 2024-12-18 PROCEDURE — 97110 THERAPEUTIC EXERCISES: CPT

## 2024-12-18 PROCEDURE — 63710000001 LEVOTHYROXINE 100 MCG TABLET: Performed by: NEUROLOGICAL SURGERY

## 2024-12-18 PROCEDURE — A9270 NON-COVERED ITEM OR SERVICE: HCPCS | Performed by: NEUROLOGICAL SURGERY

## 2024-12-18 PROCEDURE — 97162 PT EVAL MOD COMPLEX 30 MIN: CPT

## 2024-12-18 PROCEDURE — 63710000001 METHOCARBAMOL 750 MG TABLET: Performed by: NEUROLOGICAL SURGERY

## 2024-12-18 PROCEDURE — 63710000001 SENNOSIDES-DOCUSATE 8.6-50 MG TABLET: Performed by: NEUROLOGICAL SURGERY

## 2024-12-18 PROCEDURE — 63710000001 HYDROCODONE-ACETAMINOPHEN 7.5-325 MG TABLET: Performed by: NEUROLOGICAL SURGERY

## 2024-12-18 PROCEDURE — 63710000001 HYDROCODONE-ACETAMINOPHEN 7.5-325 MG TABLET: Performed by: NURSE PRACTITIONER

## 2024-12-18 PROCEDURE — A9270 NON-COVERED ITEM OR SERVICE: HCPCS | Performed by: NURSE PRACTITIONER

## 2024-12-18 PROCEDURE — 99024 POSTOP FOLLOW-UP VISIT: CPT

## 2024-12-18 PROCEDURE — 63710000001 PANTOPRAZOLE 40 MG TABLET DELAYED-RELEASE: Performed by: NEUROLOGICAL SURGERY

## 2024-12-18 PROCEDURE — 63710000001 PRAVASTATIN 40 MG TABLET: Performed by: NEUROLOGICAL SURGERY

## 2024-12-18 PROCEDURE — 63710000001 METOPROLOL SUCCINATE XL 50 MG TABLET SUSTAINED-RELEASE 24 HOUR: Performed by: NEUROLOGICAL SURGERY

## 2024-12-18 RX ORDER — HYDROCODONE BITARTRATE AND ACETAMINOPHEN 7.5; 325 MG/1; MG/1
1 TABLET ORAL EVERY 4 HOURS PRN
Status: DISCONTINUED | OUTPATIENT
Start: 2024-12-18 | End: 2024-12-19 | Stop reason: HOSPADM

## 2024-12-18 RX ORDER — HYDROCODONE BITARTRATE AND ACETAMINOPHEN 7.5; 325 MG/1; MG/1
1 TABLET ORAL EVERY 6 HOURS PRN
Status: DISCONTINUED | OUTPATIENT
Start: 2024-12-18 | End: 2024-12-18

## 2024-12-18 RX ORDER — METHOCARBAMOL 750 MG/1
750 TABLET, FILM COATED ORAL EVERY 6 HOURS SCHEDULED
Status: DISCONTINUED | OUTPATIENT
Start: 2024-12-19 | End: 2024-12-19 | Stop reason: HOSPADM

## 2024-12-18 RX ADMIN — HYDROCODONE BITARTRATE AND ACETAMINOPHEN 1 TABLET: 7.5; 325 TABLET ORAL at 21:57

## 2024-12-18 RX ADMIN — METOPROLOL SUCCINATE 50 MG: 50 TABLET, EXTENDED RELEASE ORAL at 09:28

## 2024-12-18 RX ADMIN — SENNOSIDES AND DOCUSATE SODIUM 2 TABLET: 50; 8.6 TABLET ORAL at 09:28

## 2024-12-18 RX ADMIN — METHOCARBAMOL 750 MG: 750 TABLET ORAL at 12:43

## 2024-12-18 RX ADMIN — METHOCARBAMOL 750 MG: 750 TABLET ORAL at 06:36

## 2024-12-18 RX ADMIN — PANTOPRAZOLE SODIUM 40 MG: 40 TABLET, DELAYED RELEASE ORAL at 06:36

## 2024-12-18 RX ADMIN — Medication 10 ML: at 09:32

## 2024-12-18 RX ADMIN — METHOCARBAMOL 750 MG: 750 TABLET ORAL at 18:43

## 2024-12-18 RX ADMIN — LEVOTHYROXINE SODIUM 100 MCG: 0.1 TABLET ORAL at 06:36

## 2024-12-18 RX ADMIN — PRAVASTATIN SODIUM 40 MG: 40 TABLET ORAL at 09:28

## 2024-12-18 RX ADMIN — HYDROCODONE BITARTRATE AND ACETAMINOPHEN 1 TABLET: 7.5; 325 TABLET ORAL at 16:13

## 2024-12-18 RX ADMIN — HYDROCODONE BITARTRATE AND ACETAMINOPHEN 1 TABLET: 7.5; 325 TABLET ORAL at 09:28

## 2024-12-18 NOTE — THERAPY EVALUATION
Patient Name: Imani Shankar  : 1950    MRN: [id number]                              Today's Date: 2024       Admit Date: 2024    Visit Dx:     ICD-10-CM ICD-9-CM   1. Spinal stenosis of lumbar region with neurogenic claudication  M48.062 724.03   2. Herniation of lumbar intervertebral disc with radiculopathy  M51.16 722.10     724.4   3. 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 Calf muscle weakness  M62.81 728.87     Patient Active Problem List   Diagnosis    Meningioma    Other chronic pain    Left lumbar radiculopathy    Sacroiliac joint dysfunction of left side    Cholangiocarcinoma of biliary tract    Gastroesophageal reflux disease without esophagitis    Hypothyroidism    Mixed hyperlipidemia    TIA (transient ischemic attack)    Small lymphocytic lymphoma    Chronic night sweats    Back pain    Lumbar radiculopathy    Bilateral leg weakness    Spinal stenosis of lumbar region with neurogenic claudication    Herniation of lumbar intervertebral disc with radiculopathy    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 Kraft, Dr. Lanier)    Chronic low back pain     Disease of thyroid gland     Duodenal ulcer     GERD (gastroesophageal reflux disease)     GI (gastrointestinal bleed) 2018    High cholesterol     History of chemotherapy     History of radiation therapy     History of transfusion     Hypertension     Leg weakness, bilateral     Peripheral neuropathy     Small lymphocytic lymphoma     Spinal stenosis     LUMBAR    TIA (transient ischemic attack)      Past Surgical History:   Procedure Laterality Date    APPENDECTOMY       CATARACT EXTRACTION      CHOLECYSTECTOMY  01/2012    for gall bladder cancer, removed by Dr. Kendy Martinez    COLONOSCOPY  approx 2013    Too YUEN,  normal per patient    COLONOSCOPY N/A 11/29/2022    Procedure: COLONOSCOPY to cecum and TI:  bx polyps, cold snare polyp;  Surgeon: Janet Hamilton MD;  Location[facility];  Service: Gastroenterology;  Laterality: N/A;  pre:  screening  post:  polyps, divertidulosis, hemorrhoids    ENDOSCOPY N/A 4/4/2019    Procedure: ESOPHAGOGASTRODUODENOSCOPY with biopsy;  Surgeon: Ruby Ferrari MD;  [facility];  Service: Gastroenterology    LUMBAR LAMINECTOMY DISCECTOMY DECOMPRESSION N/A 12/17/2024    Procedure: Open bilateral lumbar decompression/discectomy lumbar four/five, lumbar five/sacral one;  Surgeon: Franklyn Terrazas MD;  [facility];  Service: Neurosurgery;  Laterality: N/A;    SHOULDER ARTHROSCOPY W/ ROTATOR CUFF REPAIR Right 9/28/2017    Procedure: RT SHOULDER ARTHROSCOPY ACROMIOPLASTY ROTATOR CUFF REPAIR AND LABRAL DEBRIDEMENT;  Surgeon: Nicho Hill MD;  Location:  [facility];  Service:     UPPER GASTROINTESTINAL ENDOSCOPY  02/02/2018    [facility]    multipal duodenal ulcers in D1-D2 area, no active bleed    UPPER GASTROINTESTINAL ENDOSCOPY  01/31/2018    [facility]   oozing blood of small ulcer in D1-D2 region, clot overlying ulcer, injected at base, endo clips placed    UPPER GASTROINTESTINAL ENDOSCOPY  03/14/2018    [facility]   previous ulcer almost healed    US GUIDED LYMPH NODE BIOPSY  5/2/2024      General Information       Row Name 12/18/24 1017          Physical Therapy Time and Intention    Document Type evaluation  -PC     Mode of Treatment physical therapy  -PC       Row Name 12/18/24 1017          General Information    Patient Profile Reviewed yes  -PC     Prior Level of Function independent:  -PC     Existing Precautions/Restrictions fall;spinal  -PC       Row Name  12/18/24 1017          Living Environment    People in Home spouse  -PC       Row Name 12/18/24 1017          Home Main Entrance    Number of Stairs, Main Entrance three  -PC     Stair Railings, Main Entrance railings safe and in good condition  -PC       Row Name 12/18/24 1017          Stairs Within Home, Primary    Number of Stairs, Within Home, Primary none  -PC       Row Name 12/18/24 1017          Cognition    Orientation Status (Cognition) oriented x 4  -PC       Row Name 12/18/24 1017          Safety Issues/Impairments Affecting Functional Mobility    Impairments Affecting Function (Mobility) pain;strength  -PC               User Key  (r) = Recorded By, (t) = Taken By, (c) = Cosigned By      Initials Name Provider Type    PC Alexa Brewer, PT Physical Therapist                   Mobility       Row Name 12/18/24 1017          Bed Mobility    Bed Mobility sidelying-sit  -PC     Sidelying-Sit [place] (Bed Mobility) verbal cues;contact guard  -PC       Row Name 12/18/24 1017          Sit-Stand Transfer    Sit-Stand [place] (Transfers) contact guard  -PC     Assistive Device (Sit-Stand Transfers) walker, front-wheeled  -PC       Row Name 12/18/24 1017          Gait/Stairs (Locomotion)    [place] Level (Gait) contact guard  -PC     Assistive Device (Gait) walker, front-wheeled  -PC     Patient was able to Ambulate yes  -PC     Distance in Feet (Gait) 75  -PC     Deviations/Abnormal Patterns (Gait) antalgic;gait speed decreased  -PC               User Key  (r) = Recorded By, (t) = Taken By, (c) = Cosigned By      Initials Name Provider Type    PC Alexa Brewer, PT Physical Therapist                   Obj/Interventions       Row Name 12/18/24 1022          Range of Motion Comprehensive    General Range of Motion no range of motion deficits identified  -PC       Row Name 12/18/24 1022          Strength Comprehensive (MMT)    Comment, General Manual Muscle Testing (MMT) Assessment B DF 5/5, knee ext  5/5  -PC       Row Name 12/18/24 1022          Balance    Comment, Balance WNL  -PC       Row Name 12/18/24 1022          Sensory Assessment (Somatosensory)    Sensory Assessment (Somatosensory) sensation intact  -PC               User Key  (r) = Recorded By, (t) = Taken By, (c) = Cosigned By      Initials Name Provider Type    PC Alexa Brewer, PT Physical Therapist                   Goals/Plan       Row Name 12/18/24 1031          Bed Mobility Goal 1 (PT)    Activity/Assistive Device (Bed Mobility Goal 1, PT) sit to supine/supine to sit;sidelying to sit  -PC     [place] Level/Cues Needed (Bed Mobility Goal 1, PT) supervision required  -PC     Time Frame (Bed Mobility Goal 1, PT) 1 week  -PC       Row Name 12/18/24 1031          Transfer Goal 1 (PT)    Activity/Assistive Device (Transfer Goal 1, PT) sit-to-stand/stand-to-sit  -PC     [place] Level/Cues Needed (Transfer Goal 1, PT) supervision required  -PC     Time Frame (Transfer Goal 1, PT) 1 week  -PC       Row Name 12/18/24 1031          Gait Training Goal 1 (PT)    Activity/Assistive Device (Gait Training Goal 1, PT) gait (walking locomotion);assistive device use  -PC     [place] Level (Gait Training Goal 1, PT) supervision required  -PC     Distance (Gait Training Goal 1, PT) 100  -PC     Time Frame (Gait Training Goal 1, PT) 1 week  -PC       Row Name 12/18/24 1031          Therapy Assessment/Plan (PT)    Planned Therapy Interventions (PT) balance training;bed mobility training;gait training;strengthening;transfer training  -PC               User Key  (r) = Recorded By, (t) = Taken By, (c) = Cosigned By      Initials Name Provider Type    PC Alexa Brewer, PT Physical Therapist                   Clinical Impression       Row Name 12/18/24 1023          Pain    Pretreatment Pain Rating 8/10  -PC     Posttreatment Pain Rating 8/10  -PC     Pain Location back;extremity  -PC     Pain Side/Orientation bilateral;lower  -PC     Pain Management  Interventions positioning techniques utilized;premedicated for activity  -PC     Response to Pain Interventions activity participation with tolerable pain  -PC       Row Name 12/18/24 1027 12/18/24 1023       Plan of Care Review    Plan of Care Reviewed With -- patient  -PC    Outcome Evaluation Pt is POD 1 L4/5, L 5/S1 decompression and L5/S1 B discectomy due to spinal stenosis, radiculopathy, and leg weakness. Pt presents with pain, weakness, and impaired functional mobility and will benefit from PT to address. Pt lives with her spouse, she can stay on one level, and has 3 steps to enter. Pt did well this morning, needing CGA to log roll to sit, stand, and walk 75 ft with a rolling walker, PT will cont to follow, rec home with assist at discharge  -PC --      Row Name 12/18/24 1027          Therapy Assessment/Plan (PT)    Rehab Potential (PT) good  -PC     Criteria for Skilled Interventions Met (PT) yes;meets criteria  -PC     Therapy Frequency (PT) 6 times/wk  -PC       Row Name 12/18/24 1027          Positioning and Restraints    Pre-Treatment Position in bed  -PC     Post Treatment Position chair  -PC     In Chair sitting;call light within reach;encouraged to call for assist;exit alarm on  -PC               User Key  (r) = Recorded By, (t) = Taken By, (c) = Cosigned By      Initials Name Provider Type    PC Alexa Brewer, PT Physical Therapist                   Outcome Measures       Row Name 12/18/24 1032 12/18/24 0300       How much help from another person do you currently need...    Turning from your back to your side while in flat bed without using bedrails? 3  -PC 3  -JF    Moving from lying on back to sitting on the side of a flat bed without bedrails? 3  -PC 3  -JF    Moving to and from a bed to a chair (including a wheelchair)? 3  -PC 3  -JF    Standing up from a chair using your arms (e.g., wheelchair, bedside chair)? 3  -PC 3  -JF    Climbing 3-5 steps with a railing? 2  -PC 2  -JF    To walk in  hospital room? 3  -PC 3  -JF    AM-PAC 6 Clicks Score (PT) 17  -PC 17  -JF              User Key  (r) = Recorded By, (t) = Taken By, (c) = Cosigned By      Initials Name Provider Type    PC Alexa Brewer, PT Physical Therapist    Alejandra Dawkins, RN Registered Nurse                                 Physical Therapy Education       Title: PT OT SLP Therapies (Done)       Topic: Physical Therapy (Done)       Point: Mobility training (Done)       Learning Progress Summary            Patient Acceptance, E,D, DU by PC at 12/18/2024 1033                      Point: Home exercise program (Done)       Learning Progress Summary            Patient Acceptance, E,D, DU by  at 12/18/2024 1033                      Point: Body mechanics (Done)       Learning Progress Summary            Patient Acceptance, E,D, DU by  at 12/18/2024 1033                      Point: Precautions (Done)       Learning Progress Summary            Patient Acceptance, E,D, DU by  at 12/18/2024 1033                                      User Key       Initials Effective Dates Name Provider Type Discipline     06/16/21 -  Alexa Brewer, PT Physical Therapist PT                  PT Recommendation and Plan  Planned Therapy Interventions (PT): balance training, bed mobility training, gait training, strengthening, transfer training  Outcome Evaluation: Pt is POD 1 L4/5, L 5/S1 decompression and L5/S1 B discectomy due to spinal stenosis, radiculopathy, and leg weakness. Pt presents with pain, weakness, and impaired functional mobility and will benefit from PT to address. Pt lives with her spouse, she can stay on one level, and has 3 steps to enter. Pt did well this morning, needing CGA to log roll to sit, stand, and walk 75 ft with a rolling walker, PT will cont to follow, rec home with assist at discharge     Time Calculation:         PT Charges       Row Name 12/18/24 1033             Time Calculation    Start Time 0945  -PC      Stop  Time 1010  -PC      Time Calculation (min) 25 min  -PC      PT Received On 12/18/24  -PC      PT - Next Appointment 12/19/24  -PC      PT Goal Re-Cert Due Date 12/25/24  -PC         Time Calculation- PT    Total Timed Code Minutes- PT 20 minute(s)  -PC                User Key  (r) = Recorded By, (t) = Taken By, (c) = Cosigned By      Initials Name Provider Type    PC Alexa Brewer, PT Physical Therapist                  Therapy Charges for Today       Code Description Service Date Service Provider Modifiers Qty    75069002214 HC PT EVAL MOD COMPLEXITY 3 12/18/2024 Alexa Brewer, PT GP 1    81194270223 HC PT THER PROC EA 15 MIN 12/18/2024 Alexa Brewer, PT GP 1            PT G-Codes  AM-PAC 6 Clicks Score (PT): 17  PT Discharge Summary  Anticipated Discharge Disposition (PT): home with assist    Alexa Brewer, PT  12/18/2024

## 2024-12-18 NOTE — PROGRESS NOTES
"[facility] Clinical Pharmacy Services: PCA Consult     Imani Shankar has a pharmacy consult to assess opioid medication per Dr. Terrazas's request based on the current protocol \"Pharmacist to discontinue PRN opioid pain medications at connection of PCA. If there is no basal rate on PCA, long-acting opioids may be continued. Scheduled opioids for pain are allowed while weaning patient off of PCA but PRN opioids should be limited to breakthrough pain only.\"     The following PRN medications have been discontinued per the protocol above:    Oxycodone-APAP 5-325 mg 1 tablet PO q4h PRN moderate pain      Tonny Garcia [facility]  Clinical Pharmacist    "

## 2024-12-18 NOTE — PROGRESS NOTES
[facility] THORACIC/LUMBAR NEUROSURGERY POSTOP NOTE      CC: POD #1 s/p bilateral L4-5 and L5-S1 decompression/discectomy      Subjective     Interval History: No acute issues overnight.  VSS and afebrile.  Notes expected postop pain overnight.  Notes improvement in pain after administration of p.o. narcotic in conjunction with IV Dilaudid PCA.  Tolerating p.o. liquids/solids.  Urinating without difficulty.  Passing flatus.  Ambulating without difficulty.  PT recommending home with assist.      Objective     Vital signs in last 24 hours:  Temp:  [97.5 °F (36.4 °C)-98.9 °F (37.2 °C)] 98.7 °F (37.1 °C)  Heart Rate:  [67-88] 82  Resp:  [10-18] 16  BP: (121-168)/(65-95) 122/80    Intake/Output this shift:  I/O this shift:  In: 240 [P.O.:240]  Out: 15 [Drains:15]    MAI 70 cc / 12 hours, 140 cc / 24 hours    LABS:  .  Results from last 7 days   Lab Units 12/18/24  1022   WBC 10*3/mm3 9.42   HEMOGLOBIN g/dL 12.6   HEMATOCRIT % 40.5   PLATELETS 10*3/mm3 185      Latest Reference Range & Units 12/18/24 10:22   RDW 12.3 - 15.4 % 13.6   MCV 79.0 - 97.0 fL 94.2   MCH 26.6 - 33.0 pg 29.3   MCHC 31.5 - 35.7 g/dL 31.1 (L)   MPV 6.0 - 12.0 fL 9.4   RDW-SD 37.0 - 54.0 fl 47.1   Neutrophil Rel % 42.7 - 76.0 % 73.1   Lymphocyte Rel % 19.6 - 45.3 % 13.2 (L)   Monocyte Rel % 5.0 - 12.0 % 13.3 (H)   Eosinophil Rel % 0.3 - 6.2 % 0.0 (L)   Basophil Rel % 0.0 - 1.5 % 0.1   Immature Granulocyte Rel % 0.0 - 0.5 % 0.3   Neutrophils Absolute 1.70 - 7.00 10*3/mm3 6.89   Lymphocytes Absolute 0.70 - 3.10 10*3/mm3 1.24   Monocytes Absolute 0.10 - 0.90 10*3/mm3 1.25 (H)   Eosinophils Absolute 0.00 - 0.40 10*3/mm3 0.00   Basophils Absolute 0.00 - 0.20 10*3/mm3 0.01   Immature Grans, Absolute 0.00 - 0.05 10*3/mm3 0.03   nRBC 0.0 - 0.2 /100 WBC 0.0   (L): Data is abnormally low  (H): Data is abnormally high    IMAGING STUDIES:  No new neuroimaging    I personally viewed and interpreted the patient's labs, medications and chart.    Meds reviewed/changed:  Yes    Current Facility-Administered Medications:     acetaminophen (TYLENOL) tablet 650 mg, 650 mg, Oral, Q4H PRN **OR** acetaminophen (TYLENOL) 160 MG/5ML oral solution 650 mg, 650 mg, Oral, Q4H PRN **OR** acetaminophen (TYLENOL) suppository 650 mg, 650 mg, Rectal, Q4H PRN, Franklyn Terrazas MD    sennosides-docusate (PERICOLACE) 8.6-50 MG per tablet 2 tablet, 2 tablet, Oral, BID PRN, 2 tablet at 12/18/24 0928 **AND** polyethylene glycol (MIRALAX) packet 17 g, 17 g, Oral, Daily PRN **AND** bisacodyl (DULCOLAX) EC tablet 5 mg, 5 mg, Oral, Daily PRN **AND** bisacodyl (DULCOLAX) suppository 10 mg, 10 mg, Rectal, Daily PRN, Franklyn Terrazas MD    cetirizine (zyrTEC) tablet 10 mg, 10 mg, Oral, Daily PRN, Franklyn Terrazas MD    HYDROcodone-acetaminophen (NORCO) 7.5-325 MG per tablet 1 tablet, 1 tablet, Oral, Q6H PRN, Franklyn Terrazas MD, 1 tablet at 12/18/24 0928    HYDROmorphone (DILAUDID) PCA 0.2 mg/ml 50 mL syringe, , Intravenous, Continuous, Franklyn Terrazas MD, Currently Infusing at 12/18/24 0557    lactated ringers infusion, 9 mL/hr, Intravenous, Continuous, Franklyn Terrazas MD, Stopped at 12/17/24 2100    lactated ringers infusion, 80 mL/hr, Intravenous, Continuous, Franklyn Terrazas MD, Last Rate: 80 mL/hr at 12/18/24 0557, 80 mL/hr at 12/18/24 0557    levothyroxine (SYNTHROID, LEVOTHROID) tablet 100 mcg, 100 mcg, Oral, Q AM, Franklyn Terrazas MD, 100 mcg at 12/18/24 0636    methocarbamol (ROBAXIN) tablet 750 mg, 750 mg, Oral, 4x Daily PRN, Franklyn Terrazas MD, 750 mg at 12/18/24 0636    metoprolol succinate XL (TOPROL-XL) 24 hr tablet 50 mg, 50 mg, Oral, Daily, Franklyn Terrazas MD, 50 mg at 12/18/24 0928    naloxone (NARCAN) injection 0.1 mg, 0.1 mg, Intravenous, Q5 Min PRN, Franklyn Terrazas MD    ondansetron ODT (ZOFRAN-ODT) disintegrating tablet 4 mg, 4 mg, Oral, Q6H PRN **OR** ondansetron (ZOFRAN) injection 4 mg, 4 mg, Intravenous, Q6H PRN, Franklyn Terrazas MD    pantoprazole (PROTONIX)  EC tablet 40 mg, 40 mg, Oral, Q AM, Franklyn Terrazas MD, 40 mg at 12/18/24 0636    Pharmacy Consult, , Not Applicable, Continuous PRN, Franklyn Terrazas MD    pravastatin (PRAVACHOL) tablet 40 mg, 40 mg, Oral, Daily, Franklyn Terrazas MD, 40 mg at 12/18/24 0928    sodium chloride 0.9 % flush 10 mL, 10 mL, Intravenous, Q12H, Franklyn Terrazas MD, 10 mL at 12/18/24 0932    sodium chloride 0.9 % flush 10 mL, 10 mL, Intravenous, PRN, Franklyn Terrazas MD    sodium chloride 0.9 % infusion 40 mL, 40 mL, Intravenous, PRN, Franklyn Terrazas MD    sodium chloride 0.9 % infusion, 30 mL/hr, Intravenous, Continuous PRN, Franklyn Terrazas MD      Physical Exam:    General:   Awake, alert.  Back:    Posterior lumbar incision dressing clean dry and intact. - SLR  Abdomen:  NT/ND   Motor:   Normal muscle strength, bulk and tone in lower extremities.  No fasciculations, rigidity, spasticity, or abnormal movements.  Reflexes:   2+ in the lower extremities. no clonus  Sensation:   Normal to light touch safia LEs  Station and Gait:             Per PT note 12/18/2024:  Pt presents with pain, weakness, and impaired functional mobility and will benefit from PT to address. Pt lives with her spouse, she can stay on one level, and has 3 steps to enter. Pt did well this morning, needing CGA to log roll to sit, stand, and walk 75 ft with a rolling walker, PT will cont to follow, rec home with assist at discharge   Extremities:   Wearing SCD      Assessment & Plan     ASSESSMENT:      Spinal stenosis of lumbar region    Spinal stenosis of lumbar region with neurogenic claudication    Herniation of lumbar intervertebral disc with radiculopathy    Degeneration of intervertebral disc of lumbar region with discogenic back pain and lower extremity pain    Calf muscle weakness    POD #1 s/p bilateral L4-5 and L5-S1 decompression/discectomy.  Doing well postop day 1 with expected surgical site pain.  Notes improvement in pain with p.o. opioid in  "conjunction with Dilaudid PCA pump.  Will keep Dilaudid PCA pump 1 more day and try to wean off tomorrow.  Continue to mobilize.  Utilize ice, muscle relaxers and p.o. pain meds.  CBC stable today.    Keep MAI drain today    PT recommending home with assist      PLAN:     Keep MAI drain  Mobilize  PT  Continue Dilaudid PCA  Encourage use of incentive spirometer  SCD/DVT prophylaxis  Bowel regimen  CCP consult for assistance with home health  CBC in a.m.      I discussed the patient's findings and my recommendations with patient and nursing staff and Dr. Terrazas.    During patient visit, I utilized appropriate personal protective equipment including mask and gloves.  Mask used was standard procedure mask. Appropriate PPE was worn during the entire visit.  Hand hygiene was completed before and after.      LOS: 0 days       Stone Braxton Melton, APRN  12/18/2024  12:13 EST    \"Dictated utilizing Dragon dictation\".    "

## 2024-12-18 NOTE — PLAN OF CARE
Goal Outcome Evaluation:  Plan of Care Reviewed With: patient           Outcome Evaluation: Pt is POD 1 L4/5, L 5/S1 decompression and L5/S1 B discectomy due to spinal stenosis, radiculopathy, and leg weakness. Pt presents with pain, weakness, and impaired functional mobility and will benefit from PT to address. Pt lives with her spouse, she can stay on one level, and has 3 steps to enter. Pt did well this morning, needing CGA to log roll to sit, stand, and walk 75 ft with a rolling walker, PT will cont to follow, rec home with assist at discharge    Anticipated Discharge Disposition (PT): home with assist

## 2024-12-19 VITALS
BODY MASS INDEX: 29.17 KG/M2 | SYSTOLIC BLOOD PRESSURE: 130 MMHG | HEART RATE: 96 BPM | WEIGHT: 148.59 LBS | TEMPERATURE: 97.7 F | DIASTOLIC BLOOD PRESSURE: 82 MMHG | OXYGEN SATURATION: 99 % | RESPIRATION RATE: 16 BRPM | HEIGHT: 60 IN

## 2024-12-19 LAB
BASOPHILS # BLD AUTO: 0.02 10*3/MM3 (ref 0–0.2)
BASOPHILS NFR BLD AUTO: 0.3 % (ref 0–1.5)
DEPRECATED RDW RBC AUTO: 46.1 FL (ref 37–54)
EOSINOPHIL # BLD AUTO: 0.13 10*3/MM3 (ref 0–0.4)
EOSINOPHIL NFR BLD AUTO: 2 % (ref 0.3–6.2)
ERYTHROCYTE [DISTWIDTH] IN BLOOD BY AUTOMATED COUNT: 13.6 % (ref 12.3–15.4)
HCT VFR BLD AUTO: 37.4 % (ref 34–46.6)
HGB BLD-MCNC: 12.3 G/DL (ref 12–15.9)
IMM GRANULOCYTES # BLD AUTO: 0.04 10*3/MM3 (ref 0–0.05)
IMM GRANULOCYTES NFR BLD AUTO: 0.6 % (ref 0–0.5)
LYMPHOCYTES # BLD AUTO: 2.07 10*3/MM3 (ref 0.7–3.1)
LYMPHOCYTES NFR BLD AUTO: 32.2 % (ref 19.6–45.3)
MCH RBC QN AUTO: 30.2 PG (ref 26.6–33)
MCHC RBC AUTO-ENTMCNC: 32.9 G/DL (ref 31.5–35.7)
MCV RBC AUTO: 91.9 FL (ref 79–97)
MONOCYTES # BLD AUTO: 0.77 10*3/MM3 (ref 0.1–0.9)
MONOCYTES NFR BLD AUTO: 12 % (ref 5–12)
NEUTROPHILS NFR BLD AUTO: 3.39 10*3/MM3 (ref 1.7–7)
NEUTROPHILS NFR BLD AUTO: 52.9 % (ref 42.7–76)
NRBC BLD AUTO-RTO: 0 /100 WBC (ref 0–0.2)
PLATELET # BLD AUTO: 167 10*3/MM3 (ref 140–450)
PMV BLD AUTO: 9.2 FL (ref 6–12)
RBC # BLD AUTO: 4.07 10*6/MM3 (ref 3.77–5.28)
WBC NRBC COR # BLD AUTO: 6.42 10*3/MM3 (ref 3.4–10.8)

## 2024-12-19 PROCEDURE — A9270 NON-COVERED ITEM OR SERVICE: HCPCS | Performed by: NURSE PRACTITIONER

## 2024-12-19 PROCEDURE — A9270 NON-COVERED ITEM OR SERVICE: HCPCS | Performed by: NEUROLOGICAL SURGERY

## 2024-12-19 PROCEDURE — 99024 POSTOP FOLLOW-UP VISIT: CPT | Performed by: NURSE PRACTITIONER

## 2024-12-19 PROCEDURE — 63710000001 SENNOSIDES-DOCUSATE 8.6-50 MG TABLET: Performed by: NEUROLOGICAL SURGERY

## 2024-12-19 PROCEDURE — 63710000001 POLYETHYLENE GLYCOL 17 G PACK: Performed by: NEUROLOGICAL SURGERY

## 2024-12-19 PROCEDURE — 63710000001 METHOCARBAMOL 750 MG TABLET: Performed by: NURSE PRACTITIONER

## 2024-12-19 PROCEDURE — 63710000001 PRAVASTATIN 40 MG TABLET: Performed by: NEUROLOGICAL SURGERY

## 2024-12-19 PROCEDURE — 85025 COMPLETE CBC W/AUTO DIFF WBC: CPT

## 2024-12-19 PROCEDURE — 63710000001 HYDROCODONE-ACETAMINOPHEN 7.5-325 MG TABLET: Performed by: NURSE PRACTITIONER

## 2024-12-19 PROCEDURE — 63710000001 PANTOPRAZOLE 40 MG TABLET DELAYED-RELEASE: Performed by: NEUROLOGICAL SURGERY

## 2024-12-19 PROCEDURE — 63710000001 LEVOTHYROXINE 100 MCG TABLET: Performed by: NEUROLOGICAL SURGERY

## 2024-12-19 PROCEDURE — 97110 THERAPEUTIC EXERCISES: CPT

## 2024-12-19 PROCEDURE — 63710000001 METOPROLOL SUCCINATE XL 50 MG TABLET SUSTAINED-RELEASE 24 HOUR: Performed by: NEUROLOGICAL SURGERY

## 2024-12-19 RX ORDER — AMOXICILLIN 250 MG
2 CAPSULE ORAL 2 TIMES DAILY PRN
Qty: 20 TABLET | Refills: 0 | Status: SHIPPED | OUTPATIENT
Start: 2024-12-19

## 2024-12-19 RX ORDER — POLYETHYLENE GLYCOL 3350 17 G/17G
17 POWDER, FOR SOLUTION ORAL DAILY PRN
COMMUNITY
Start: 2024-12-19

## 2024-12-19 RX ORDER — ACETAMINOPHEN 325 MG/1
650 TABLET ORAL EVERY 4 HOURS PRN
COMMUNITY
Start: 2024-12-19

## 2024-12-19 RX ORDER — HYDROCODONE BITARTRATE AND ACETAMINOPHEN 7.5; 325 MG/1; MG/1
1 TABLET ORAL EVERY 4 HOURS PRN
Qty: 25 TABLET | Refills: 0 | Status: SHIPPED | OUTPATIENT
Start: 2024-12-19 | End: 2024-12-26 | Stop reason: SDUPTHER

## 2024-12-19 RX ORDER — ASPIRIN 81 MG/1
81 TABLET, CHEWABLE ORAL DAILY
Start: 2024-12-21

## 2024-12-19 RX ADMIN — HYDROCODONE BITARTRATE AND ACETAMINOPHEN 1 TABLET: 7.5; 325 TABLET ORAL at 14:50

## 2024-12-19 RX ADMIN — PANTOPRAZOLE SODIUM 40 MG: 40 TABLET, DELAYED RELEASE ORAL at 06:17

## 2024-12-19 RX ADMIN — METOPROLOL SUCCINATE 50 MG: 50 TABLET, EXTENDED RELEASE ORAL at 08:53

## 2024-12-19 RX ADMIN — POLYETHYLENE GLYCOL 3350 17 G: 17 POWDER, FOR SOLUTION ORAL at 08:53

## 2024-12-19 RX ADMIN — HYDROCODONE BITARTRATE AND ACETAMINOPHEN 1 TABLET: 7.5; 325 TABLET ORAL at 11:18

## 2024-12-19 RX ADMIN — HYDROCODONE BITARTRATE AND ACETAMINOPHEN 1 TABLET: 7.5; 325 TABLET ORAL at 06:17

## 2024-12-19 RX ADMIN — HYDROCODONE BITARTRATE AND ACETAMINOPHEN 1 TABLET: 7.5; 325 TABLET ORAL at 02:26

## 2024-12-19 RX ADMIN — PRAVASTATIN SODIUM 40 MG: 40 TABLET ORAL at 08:53

## 2024-12-19 RX ADMIN — LEVOTHYROXINE SODIUM 100 MCG: 0.1 TABLET ORAL at 06:17

## 2024-12-19 RX ADMIN — METHOCARBAMOL 750 MG: 750 TABLET ORAL at 12:26

## 2024-12-19 RX ADMIN — SENNOSIDES AND DOCUSATE SODIUM 2 TABLET: 50; 8.6 TABLET ORAL at 08:53

## 2024-12-19 NOTE — PLAN OF CARE
Goal Outcome Evaluation:              Outcome Evaluation: Improved activity tolerance and independence w/ mobility during PT.  Able to ambulate 200' w/ SBA using RW.  Ascend/descended  4 steps w/ SBA.  Reviewed spinal precautions, activity recommendations, and  recommendation for OP PT when cleared by surgeon.  Pt owns RW, discharging home today.    Anticipated Discharge Disposition (PT): home with assist

## 2024-12-19 NOTE — DISCHARGE INSTRUCTIONS
LANI SONG M.D.  [address], [address]  [place] [address]  [phone]        Lumbar Discectomy or Decompression    Post-Operative Instructions    1. You should have a post-operative visit scheduled with the Physician Assistant or Nurse Practitioner for approximately 2 to 2 ½ weeks from your date of surgery. If you do not have one, please call the office when you return home to schedule a visit. You should also be off work at least until your first visit.    2. Do not lift anything over five (5) pounds. No bending, twisting, or squatting should be done until the first visit. However, walking is allowed and encouraged. Walking should be done on a flat surface. The speed and distance should be chosen according to your comfort level and stamina. In general, short frequent walks are preferred. Climbing stairs is allowed, but should be minimized. In general, activity restrictions will be lessened following the first visit.    3. Sitting, while allowed, should be kept to a minimum until the first visit because it may increase your discomfort. No more than 15 to 20 minutes, of sitting at a time is recommended. A reclining chair is acceptable. Take some time to lie flat during the day as well. You may lie on your back or side but try to keep air flow to your back as much as possible. .     4. Please do not drive until the first visit, although you may be driven.    5. Refrain from any sexual activity until after your first visit with the physician assistant or nurse practitioner.    6. Take your dressings off and leave them off after the first day home. Keep the wound clean and dry. You should have Steri-Strips across the incision line. Allow Steri-Strips to fall off naturally. Do not pick, scratch or rub the wound.  Allow air to get to the incision for better healing..You may shower, let water run over the wound, pat incision dry and leave open to air. Do not soak your wound in water. Avoid swimming or  using a hot tub. Avoid direct sunlight to the wound.  Avoid tanning beds or lamps until the incision is completely healed by scar.  Avoid any creams, lotion, or ointment to the wound until it has completely healed and scar formation noted.Do not apply any tape, sticky or occlusive dressing dressing, alcohol to the incision.    7. You will leave the hospital with medications for pain and possibly oral antibiotics if indicated. These medications must be taken as prescribed only. If a refill of your pain medication is required, in order to have this medication refilled you must contact the office four days prior to the due date.    8. If there are any problems, such as excessive back or leg pain, persistent temperature of 100 degrees or greater despite Tylenol, chills, excessive bloody discharge from the wound, redness and hot skin around the incision, clear or yellowish discharge from the wound, or severe headaches, particularly when sitting or standing, please call the office. A small amount of bleeding from the incision during the first few days is not unusual. For any development of chest pain or shortness of breath, go directly to the Emergency Department. If you develop swelling, warmth or pain with palpation of the your extremities, call Dr. Terrazas's office.

## 2024-12-19 NOTE — PROGRESS NOTES
Continued Stay Note  [facility]     Patient Name: Imani Shankar  MRN: [id number]  Today's Date: 12/19/2024    Admit Date: 12/17/2024        Discharge Plan       Row Name 12/19/24 1532       Plan    Final Discharge Disposition Code 01 - home or self-care    Final Note D/c home                   Discharge Codes    No documentation.                 Expected Discharge Date and Time       Expected Discharge Date Expected Discharge Time    Dec 19, 2024               Minnie Norton RN

## 2024-12-19 NOTE — PLAN OF CARE
Goal Outcome Evaluation:  Plan of Care Reviewed With: patient        Progress: improving  Outcome Evaluation: Patient ambulating with walker and stand by assist. VSS and voiding function is intact. Pain managed with prn norco and robaxin and dilaudid PCA, frequency of po meds adjusted for improved pain management. Patient educated on bp monitoring and med management. Plan for d/c home with family assist when stable.

## 2024-12-19 NOTE — THERAPY TREATMENT NOTE
Patient Name: Imani Shankar  : 1950    MRN: [id number]                              Today's Date: 2024       Admit Date: 2024    Visit Dx:     ICD-10-CM ICD-9-CM   1. Spinal stenosis of lumbar region with neurogenic claudication  M48.062 724.03   2. Herniation of lumbar intervertebral disc with radiculopathy  M51.16 722.10     724.4   3. 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 Calf muscle weakness  M62.81 728.87     Patient Active Problem List   Diagnosis    Meningioma    Other chronic pain    Left lumbar radiculopathy    Sacroiliac joint dysfunction of left side    Cholangiocarcinoma of biliary tract    Gastroesophageal reflux disease without esophagitis    Hypothyroidism    Mixed hyperlipidemia    TIA (transient ischemic attack)    Small lymphocytic lymphoma    Chronic night sweats    Back pain    Lumbar radiculopathy    Bilateral leg weakness    Spinal stenosis of lumbar region with neurogenic claudication    Herniation of lumbar intervertebral disc with radiculopathy    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 Kraft, Dr. Lanier)    Chronic low back pain     Disease of thyroid gland     Duodenal ulcer     GERD (gastroesophageal reflux disease)     GI (gastrointestinal bleed) 2018    High cholesterol     History of chemotherapy     History of radiation therapy     History of transfusion     Hypertension     Leg weakness, bilateral     Peripheral neuropathy     Small lymphocytic lymphoma     Spinal stenosis     LUMBAR    TIA (transient ischemic attack)      Past Surgical History:   Procedure Laterality Date    APPENDECTOMY       CATARACT EXTRACTION      CHOLECYSTECTOMY  01/2012    for gall bladder cancer, removed by Dr. Kendy Martinez    COLONOSCOPY  approx 2013    Too YUEN,  normal per patient    COLONOSCOPY N/A 11/29/2022    Procedure: COLONOSCOPY to cecum and TI:  bx polyps, cold snare polyp;  Surgeon: Janet Hamilton MD;  Location: [facility] ENDOSCOPY;  Service: Gastroenterology;  Laterality: N/A;  pre:  screening  post:  polyps, divertidulosis, hemorrhoids    ENDOSCOPY N/A 4/4/2019    Procedure: ESOPHAGOGASTRODUODENOSCOPY with biopsy;  Surgeon: Ruby Ferrari MD;  Location: [facility] ENDOSCOPY;  Service: Gastroenterology    LUMBAR LAMINECTOMY DISCECTOMY DECOMPRESSION N/A 12/17/2024    Procedure: Open bilateral lumbar decompression/discectomy lumbar four/five, lumbar five/sacral one;  Surgeon: Franklyn Terrazas MD;  Location: [facility] MAIN OR;  Service: Neurosurgery;  Laterality: N/A;    SHOULDER ARTHROSCOPY W/ ROTATOR CUFF REPAIR Right 9/28/2017    Procedure: RT SHOULDER ARTHROSCOPY ACROMIOPLASTY ROTATOR CUFF REPAIR AND LABRAL DEBRIDEMENT;  Surgeon: Nicho Hill MD;  Location:  [facility];  Service:     UPPER GASTROINTESTINAL ENDOSCOPY  02/02/2018    [facility]    multipal duodenal ulcers in D1-D2 area, no active bleed    UPPER GASTROINTESTINAL ENDOSCOPY  01/31/2018    [facility]   oozing blood of small ulcer in D1-D2 region, clot overlying ulcer, injected at base, endo clips placed    UPPER GASTROINTESTINAL ENDOSCOPY  03/14/2018    [facility]   previous ulcer almost healed    US GUIDED LYMPH NODE BIOPSY  5/2/2024      General Information       Row Name 12/19/24 4336          Physical Therapy Time and Intention    Document Type therapy note (daily note)  -AR     Mode of Treatment physical therapy  -AR       Row Name 12/19/24 0563          General Information    Patient Profile Reviewed yes  -AR     Existing Precautions/Restrictions fall;spinal  -AR       Row Name 12/19/24 5268           Cognition    Orientation Status (Cognition) oriented x 4  -AR               User Key  (r) = Recorded By, (t) = Taken By, (c) = Cosigned By      Initials Name Provider Type    AR Rosa M Yap, PT Physical Therapist                   Mobility       Row Name 12/19/24 1339          Bed Mobility    Sidelying-Sit [place] (Bed Mobility) not tested  -AR       Row Name 12/19/24 1339          Sit-Stand Transfer    Sit-Stand [place] (Transfers) standby assist  -AR     Assistive Device (Sit-Stand Transfers) walker, front-wheeled  -AR       Row Name 12/19/24 1339          Gait/Stairs (Locomotion)    [place] Level (Gait) standby assist  -AR     Assistive Device (Gait) walker, front-wheeled  -AR     Distance in Feet (Gait) 200  -AR     Deviations/Abnormal Patterns (Gait) antalgic;gait speed decreased  -AR     Bilateral Gait Deviations heel strike decreased  -AR     [place] Level (Stairs) stand by assist  -AR     Handrail Location (Stairs) right side (descending)  -AR     Number of Steps (Stairs) 4  -AR     Ascending Technique (Stairs) step-to-step  -AR     Descending Technique (Stairs) step-to-step  -AR               User Key  (r) = Recorded By, (t) = Taken By, (c) = Cosigned By      Initials Name Provider Type    AR Rosa M Yap, PT Physical Therapist                   Obj/Interventions       Row Name 12/19/24 1339          Balance    Balance Assessment standing static balance  -AR     Static Standing Balance standby assist  -AR     Position/Device Used, Standing Balance walker, rolling  -AR               User Key  (r) = Recorded By, (t) = Taken By, (c) = Cosigned By      Initials Name Provider Type    Rosa M Patel, PT Physical Therapist                   Goals/Plan    No documentation.                  Clinical Impression       Row Name 12/19/24 1339          Pain    Pretreatment Pain Rating 5/10  -AR     Posttreatment Pain Rating 5/10  -AR     Pain Location back  -AR       Row Name 12/19/24  1339          Plan of Care Review    Outcome Evaluation Improved activity tolerance and independence w/ mobility during PT.  Able to ambulate 200' w/ SBA using RW.  Ascend/descended  4 steps w/ SBA.  Reviewed spinal precautions, activity recommendations, and  recommendation for OP PT when cleared by surgeon.  Pt owns RW, discharging home today.  -AR       Row Name 12/19/24 1339          Vital Signs    O2 Delivery Pre Treatment room air  -AR       Row Name 12/19/24 1339          Positioning and Restraints    Pre-Treatment Position in bed  siting EOB, no alarm  -AR     Post Treatment Position bed  -AR     In Bed notified nsg;sitting EOB;call light within reach;with family/caregiver  -AR               User Key  (r) = Recorded By, (t) = Taken By, (c) = Cosigned By      Initials Name Provider Type    Rosa M Patel, PT Physical Therapist                   Outcome Measures       Row Name 12/19/24 1341 12/19/24 0300       How much help from another person do you currently need...    Turning from your back to your side while in flat bed without using bedrails? 4  -AR 3  -JF    Moving from lying on back to sitting on the side of a flat bed without bedrails? 4  -AR 3  -JF    Moving to and from a bed to a chair (including a wheelchair)? 4  -AR 3  -JF    Standing up from a chair using your arms (e.g., wheelchair, bedside chair)? 4  -AR 3  -JF    Climbing 3-5 steps with a railing? 3  -AR 2  -JF    To walk in hospital room? 3  -AR 4  -JF    AM-PAC 6 Clicks Score (PT) 22  -AR 18  -JF    Highest Level of Mobility Goal 7 --> Walk 25 feet or more  -AR 6 --> Walk 10 steps or more  -JF      Row Name 12/19/24 1341          Functional Assessment    Outcome Measure Options AM-PAC 6 Clicks Basic Mobility (PT)  -AR               User Key  (r) = Recorded By, (t) = Taken By, (c) = Cosigned By      Initials Name Provider Type    Rosa M Patel, PT Physical Therapist    Alejandra Dawkins, RN Registered Nurse                                  Physical Therapy Education       Title: PT OT SLP Therapies (Done)       Topic: Physical Therapy (Done)       Point: Mobility training (Done)       Learning Progress Summary            Patient Acceptance, E, VU by AR at 12/19/2024 1342    Acceptance, E,D, DU by PC at 12/18/2024 1033                      Point: Home exercise program (Done)       Learning Progress Summary            Patient Acceptance, E, VU by AR at 12/19/2024 1342    Acceptance, E,D, DU by PC at 12/18/2024 1033                      Point: Body mechanics (Done)       Learning Progress Summary            Patient Acceptance, E, VU by AR at 12/19/2024 1342    Acceptance, E,D, DU by PC at 12/18/2024 1033                      Point: Precautions (Done)       Learning Progress Summary            Patient Acceptance, E, VU by AR at 12/19/2024 1342    Acceptance, E,D, DU by PC at 12/18/2024 1033                                      User Key       Initials Effective Dates Name Provider Type Discipline     06/16/21 -  Alexa Brewer PT Physical Therapist PT    AR 06/16/21 -  Rosa M Yap PT Physical Therapist PT                  PT Recommendation and Plan     Outcome Evaluation: Improved activity tolerance and independence w/ mobility during PT.  Able to ambulate 200' w/ SBA using RW.  Ascend/descended  4 steps w/ SBA.  Reviewed spinal precautions, activity recommendations, and  recommendation for OP PT when cleared by surgeon.  Pt owns RW, discharging home today.     Time Calculation:         PT Charges       Row Name 12/19/24 1338             Time Calculation    Start Time 1317  -AR      Stop Time 1338  -AR      Time Calculation (min) 21 min  -AR      PT Received On 12/19/24  -AR                User Key  (r) = Recorded By, (t) = Taken By, (c) = Cosigned By      Initials Name Provider Type    AR Rosa M Yap, PT Physical Therapist                  Therapy Charges for Today       Code Description Service Date Service Provider  Modifiers Qty    [id number] HC PT THER PROC EA 15 MIN 12/19/2024 Rosa M Yap, PT GP 1            PT G-Codes  Outcome Measure Options: AM-PAC 6 Clicks Basic Mobility (PT)  AM-PAC 6 Clicks Score (PT): 22  PT Discharge Summary  Anticipated Discharge Disposition (PT): home with assist    Rosa M Yap PT  12/19/2024

## 2024-12-20 NOTE — PLAN OF CARE
Goal Outcome Evaluation:  Plan of Care Reviewed With: patient   Patient stable, VSS and voiding function is intact. Pain managed with prn meds. Patient ambulating with walker and stand by assist. BM today. Patient educated on bp monitoring and med management. Patient is prepared and ready for d/c home with family assist.

## 2024-12-24 ENCOUNTER — HOSPITAL ENCOUNTER (EMERGENCY)
Facility: HOSPITAL | Age: 74
Discharge: HOME | End: 2024-12-24
Attending: EMERGENCY MEDICINE | Admitting: EMERGENCY MEDICINE
Payer: MEDICARE

## 2024-12-24 ENCOUNTER — APPOINTMENT (OUTPATIENT)
Dept: GENERAL RADIOLOGY | Facility: HOSPITAL | Age: 74
End: 2024-12-24
Payer: MEDICARE

## 2024-12-24 ENCOUNTER — TELEPHONE (OUTPATIENT)
Dept: NEUROSURGERY | Facility: CLINIC | Age: 74
End: 2024-12-24
Payer: MEDICARE

## 2024-12-24 ENCOUNTER — APPOINTMENT (OUTPATIENT)
Dept: CARDIOLOGY | Facility: HOSPITAL | Age: 74
End: 2024-12-24
Payer: MEDICARE

## 2024-12-24 VITALS
BODY MASS INDEX: 27.94 KG/M2 | WEIGHT: 148 LBS | TEMPERATURE: 98.4 F | OXYGEN SATURATION: 97 % | RESPIRATION RATE: 20 BRPM | SYSTOLIC BLOOD PRESSURE: 148 MMHG | HEIGHT: 61 IN | DIASTOLIC BLOOD PRESSURE: 91 MMHG | HEART RATE: 86 BPM

## 2024-12-24 DIAGNOSIS — R60.0 BILATERAL LOWER EXTREMITY EDEMA: Primary | ICD-10-CM

## 2024-12-24 DIAGNOSIS — Z98.890 POST-OPERATIVE STATE: ICD-10-CM

## 2024-12-24 DIAGNOSIS — R73.9 HYPERGLYCEMIA: ICD-10-CM

## 2024-12-24 LAB
ALBUMIN SERPL-MCNC: 3.4 G/DL (ref 3.5–5.2)
ALBUMIN/GLOB SERPL: 1.3 G/DL
ALP SERPL-CCNC: 81 U/L (ref 39–117)
ALT SERPL W P-5'-P-CCNC: 18 U/L (ref 1–33)
ANION GAP SERPL CALCULATED.3IONS-SCNC: 9.6 MMOL/L (ref 5–15)
AST SERPL-CCNC: 23 U/L (ref 1–32)
BASOPHILS # BLD AUTO: 0.02 10*3/MM3 (ref 0–0.2)
BASOPHILS NFR BLD AUTO: 0.4 % (ref 0–1.5)
BH CV LOWER VASCULAR LEFT COMMON FEMORAL AUGMENT: NORMAL
BH CV LOWER VASCULAR LEFT COMMON FEMORAL COMPETENT: NORMAL
BH CV LOWER VASCULAR LEFT COMMON FEMORAL COMPRESS: NORMAL
BH CV LOWER VASCULAR LEFT COMMON FEMORAL PHASIC: NORMAL
BH CV LOWER VASCULAR LEFT COMMON FEMORAL SPONT: NORMAL
BH CV LOWER VASCULAR LEFT DISTAL FEMORAL COMPRESS: NORMAL
BH CV LOWER VASCULAR LEFT GASTRONEMIUS COMPRESS: NORMAL
BH CV LOWER VASCULAR LEFT GREATER SAPH AK COMPRESS: NORMAL
BH CV LOWER VASCULAR LEFT GREATER SAPH BK COMPRESS: NORMAL
BH CV LOWER VASCULAR LEFT LESSER SAPH COMPRESS: NORMAL
BH CV LOWER VASCULAR LEFT MID FEMORAL AUGMENT: NORMAL
BH CV LOWER VASCULAR LEFT MID FEMORAL COMPETENT: NORMAL
BH CV LOWER VASCULAR LEFT MID FEMORAL COMPRESS: NORMAL
BH CV LOWER VASCULAR LEFT MID FEMORAL PHASIC: NORMAL
BH CV LOWER VASCULAR LEFT MID FEMORAL SPONT: NORMAL
BH CV LOWER VASCULAR LEFT PERONEAL COMPRESS: NORMAL
BH CV LOWER VASCULAR LEFT POPLITEAL AUGMENT: NORMAL
BH CV LOWER VASCULAR LEFT POPLITEAL COMPETENT: NORMAL
BH CV LOWER VASCULAR LEFT POPLITEAL COMPRESS: NORMAL
BH CV LOWER VASCULAR LEFT POPLITEAL PHASIC: NORMAL
BH CV LOWER VASCULAR LEFT POPLITEAL SPONT: NORMAL
BH CV LOWER VASCULAR LEFT POSTERIOR TIBIAL COMPRESS: NORMAL
BH CV LOWER VASCULAR LEFT PROFUNDA FEMORAL COMPRESS: NORMAL
BH CV LOWER VASCULAR LEFT PROXIMAL FEMORAL COMPRESS: NORMAL
BH CV LOWER VASCULAR LEFT SAPHENOFEMORAL JUNCTION COMPRESS: NORMAL
BH CV LOWER VASCULAR RIGHT COMMON FEMORAL AUGMENT: NORMAL
BH CV LOWER VASCULAR RIGHT COMMON FEMORAL COMPETENT: NORMAL
BH CV LOWER VASCULAR RIGHT COMMON FEMORAL COMPRESS: NORMAL
BH CV LOWER VASCULAR RIGHT COMMON FEMORAL PHASIC: NORMAL
BH CV LOWER VASCULAR RIGHT COMMON FEMORAL SPONT: NORMAL
BH CV LOWER VASCULAR RIGHT DISTAL FEMORAL COMPRESS: NORMAL
BH CV LOWER VASCULAR RIGHT GASTRONEMIUS COMPRESS: NORMAL
BH CV LOWER VASCULAR RIGHT GREATER SAPH AK COMPRESS: NORMAL
BH CV LOWER VASCULAR RIGHT GREATER SAPH BK COMPRESS: NORMAL
BH CV LOWER VASCULAR RIGHT LESSER SAPH COMPRESS: NORMAL
BH CV LOWER VASCULAR RIGHT MID FEMORAL AUGMENT: NORMAL
BH CV LOWER VASCULAR RIGHT MID FEMORAL COMPETENT: NORMAL
BH CV LOWER VASCULAR RIGHT MID FEMORAL COMPRESS: NORMAL
BH CV LOWER VASCULAR RIGHT MID FEMORAL PHASIC: NORMAL
BH CV LOWER VASCULAR RIGHT MID FEMORAL SPONT: NORMAL
BH CV LOWER VASCULAR RIGHT PERONEAL COMPRESS: NORMAL
BH CV LOWER VASCULAR RIGHT POPLITEAL AUGMENT: NORMAL
BH CV LOWER VASCULAR RIGHT POPLITEAL COMPETENT: NORMAL
BH CV LOWER VASCULAR RIGHT POPLITEAL COMPRESS: NORMAL
BH CV LOWER VASCULAR RIGHT POPLITEAL PHASIC: NORMAL
BH CV LOWER VASCULAR RIGHT POPLITEAL SPONT: NORMAL
BH CV LOWER VASCULAR RIGHT POSTERIOR TIBIAL COMPRESS: NORMAL
BH CV LOWER VASCULAR RIGHT PROFUNDA FEMORAL COMPRESS: NORMAL
BH CV LOWER VASCULAR RIGHT PROXIMAL FEMORAL COMPRESS: NORMAL
BH CV LOWER VASCULAR RIGHT SAPHENOFEMORAL JUNCTION COMPRESS: NORMAL
BH CV VAS PRELIMINARY FINDINGS SCRIPTING: 1
BILIRUB SERPL-MCNC: 0.2 MG/DL (ref 0–1.2)
BUN SERPL-MCNC: 7 MG/DL (ref 8–23)
BUN/CREAT SERPL: 9.5 (ref 7–25)
CALCIUM SPEC-SCNC: 9.2 MG/DL (ref 8.6–10.5)
CHLORIDE SERPL-SCNC: 101 MMOL/L (ref 98–107)
CO2 SERPL-SCNC: 22.4 MMOL/L (ref 22–29)
CREAT SERPL-MCNC: 0.74 MG/DL (ref 0.57–1)
DEPRECATED RDW RBC AUTO: 45.4 FL (ref 37–54)
EGFRCR SERPLBLD CKD-EPI 2021: 84.5 ML/MIN/1.73
EOSINOPHIL # BLD AUTO: 0.12 10*3/MM3 (ref 0–0.4)
EOSINOPHIL NFR BLD AUTO: 2.7 % (ref 0.3–6.2)
ERYTHROCYTE [DISTWIDTH] IN BLOOD BY AUTOMATED COUNT: 13.3 % (ref 12.3–15.4)
GLOBULIN UR ELPH-MCNC: 2.6 GM/DL
GLUCOSE SERPL-MCNC: 167 MG/DL (ref 65–99)
HCT VFR BLD AUTO: 35 % (ref 34–46.6)
HGB BLD-MCNC: 11.3 G/DL (ref 12–15.9)
IMM GRANULOCYTES # BLD AUTO: 0.05 10*3/MM3 (ref 0–0.05)
IMM GRANULOCYTES NFR BLD AUTO: 1.1 % (ref 0–0.5)
LYMPHOCYTES # BLD AUTO: 0.76 10*3/MM3 (ref 0.7–3.1)
LYMPHOCYTES NFR BLD AUTO: 17 % (ref 19.6–45.3)
MCH RBC QN AUTO: 30 PG (ref 26.6–33)
MCHC RBC AUTO-ENTMCNC: 32.3 G/DL (ref 31.5–35.7)
MCV RBC AUTO: 92.8 FL (ref 79–97)
MONOCYTES # BLD AUTO: 0.7 10*3/MM3 (ref 0.1–0.9)
MONOCYTES NFR BLD AUTO: 15.7 % (ref 5–12)
NEUTROPHILS NFR BLD AUTO: 2.82 10*3/MM3 (ref 1.7–7)
NEUTROPHILS NFR BLD AUTO: 63.1 % (ref 42.7–76)
NRBC BLD AUTO-RTO: 0 /100 WBC (ref 0–0.2)
NT-PROBNP SERPL-MCNC: 127 PG/ML (ref 0–1800)
PLATELET # BLD AUTO: 187 10*3/MM3 (ref 140–450)
PMV BLD AUTO: 9.1 FL (ref 6–12)
POTASSIUM SERPL-SCNC: 3.9 MMOL/L (ref 3.5–5.2)
PROT SERPL-MCNC: 6 G/DL (ref 6–8.5)
QT INTERVAL: 352 MS
QTC INTERVAL: 409 MS
RBC # BLD AUTO: 3.77 10*6/MM3 (ref 3.77–5.28)
SODIUM SERPL-SCNC: 133 MMOL/L (ref 136–145)
TROPONIN T SERPL HS-MCNC: 12 NG/L
WBC NRBC COR # BLD AUTO: 4.47 10*3/MM3 (ref 3.4–10.8)

## 2024-12-24 PROCEDURE — 80053 COMPREHEN METABOLIC PANEL: CPT | Performed by: EMERGENCY MEDICINE

## 2024-12-24 PROCEDURE — 99284 EMERGENCY DEPT VISIT MOD MDM: CPT

## 2024-12-24 PROCEDURE — 93005 ELECTROCARDIOGRAM TRACING: CPT | Performed by: EMERGENCY MEDICINE

## 2024-12-24 PROCEDURE — 71045 X-RAY EXAM CHEST 1 VIEW: CPT

## 2024-12-24 PROCEDURE — 83880 ASSAY OF NATRIURETIC PEPTIDE: CPT | Performed by: EMERGENCY MEDICINE

## 2024-12-24 PROCEDURE — 85025 COMPLETE CBC W/AUTO DIFF WBC: CPT | Performed by: EMERGENCY MEDICINE

## 2024-12-24 PROCEDURE — 93970 EXTREMITY STUDY: CPT

## 2024-12-24 PROCEDURE — 84484 ASSAY OF TROPONIN QUANT: CPT | Performed by: EMERGENCY MEDICINE

## 2024-12-24 NOTE — TELEPHONE ENCOUNTER
Patient called stating that she is having severe pain and swelling in both ankles and legs. Patient had surgery with Dr MARKS on 12/17. Please call and advise.

## 2024-12-24 NOTE — ED PROVIDER NOTES
EMERGENCY DEPARTMENT ENCOUNTER  Room Number:  34/34  PCP: Chang Carter MD  Independent Historians: Patient and Family      HPI:  Chief Complaint: had concerns including Leg Swelling and Shortness of Breath.     A complete HPI/ROS/PMH/PSH/SH/FH are unobtainable due to: None    Chronic or social conditions impacting patient care (Social Determinants of Health): None      Context: Imani Shankar is a 74 y.o. female with a medical history of spinal stenosis, TIA, radiculopathy who presents to the ED c/o acute leg swelling.  The patient reports that she had back surgery last Monday.  She reports she has been up and ambulatory since then.  She reports she has developed bilateral ankle swelling with pitting edema.  She denies any chest pain or shortness of breath.  She denies any cardiac history.  She denies any kidney disease.  Denies calf pain.  She is only on 81 mg aspirin for blood thinning.      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    Chronic night sweats 05/07/2024    Back pain 12/03/2024    Lumbar radiculopathy 12/10/2024    Bilateral leg weakness 12/10/2024    Spinal stenosis of lumbar region with neurogenic claudication 12/11/2024    Herniation of lumbar intervertebral disc with radiculopathy 12/11/2024     Degeneration of intervertebral disc of lumbar region with discogenic back pain and lower extremity pain 12/11/2024    Calf muscle weakness 12/11/2024    Spinal stenosis of lumbar region 12/17/2024     Resolved Ambulatory Problems     Diagnosis Date Noted    Acute gastric ulcer with hemorrhage 12/21/2018    Encounter for screening for malignant neoplasm of colon 10/13/2022     Past Medical History:   Diagnosis Date    Arthritis     Bleeding ulcer 2018    Cancer 01/2012    Chronic low back pain     Disease of thyroid gland     Duodenal ulcer     GERD (gastroesophageal reflux disease)     GI (gastrointestinal bleed) 02/02/2018    High cholesterol     History of chemotherapy     History of radiation therapy     History of transfusion     Hypertension     Leg weakness, bilateral     Peripheral neuropathy     Spinal stenosis          PAST SURGICAL HISTORY  Past Surgical History:   Procedure Laterality Date    APPENDECTOMY      CATARACT EXTRACTION      CHOLECYSTECTOMY  01/2012    for gall bladder cancer, removed by Dr. Kendy Martinez    COLONOSCOPY  approx 2013    Too YUEN,  normal per patient    COLONOSCOPY N/A 11/29/2022    Procedure: COLONOSCOPY to cecum and TI:  bx polyps, cold snare polyp;  Surgeon: Janet Hamilton MD;  Location: [facility] ENDOSCOPY;  Service: Gastroenterology;  Laterality: N/A;  pre:  screening  post:  polyps, divertidulosis, hemorrhoids    ENDOSCOPY N/A 4/4/2019    Procedure: ESOPHAGOGASTRODUODENOSCOPY with biopsy;  Surgeon: Ruby Ferrari MD;  Location: [facility] ENDOSCOPY;  Service: Gastroenterology    LUMBAR LAMINECTOMY DISCECTOMY DECOMPRESSION N/A 12/17/2024    Procedure: Open bilateral lumbar decompression/discectomy lumbar four/five, lumbar five/sacral one;  Surgeon: Franklyn Terrazas MD;  Location: [facility] MAIN OR;  Service: Neurosurgery;  Laterality: N/A;    SHOULDER ARTHROSCOPY W/ ROTATOR CUFF REPAIR Right 9/28/2017    Procedure: RT SHOULDER ARTHROSCOPY ACROMIOPLASTY ROTATOR CUFF  REPAIR AND LABRAL DEBRIDEMENT;  Surgeon: Nicho Hill MD;  Location: [facility] OR [facility] [place] [facility];  Service:     UPPER GASTROINTESTINAL ENDOSCOPY  02/02/2018    [facility]    multipal duodenal ulcers in D1-D2 area, no active bleed    UPPER GASTROINTESTINAL ENDOSCOPY  01/31/2018    Merged with [facility]   oozing blood of small ulcer in D1-D2 region, clot overlying ulcer, injected at base, endo clips placed    UPPER GASTROINTESTINAL ENDOSCOPY  03/14/2018    Merged with [facility]   previous ulcer almost healed    US GUIDED LYMPH NODE BIOPSY  5/2/2024         FAMILY HISTORY  Family History   Problem Relation Age of Onset    Ulcers Mother     Kidney failure Father     Hypertension Father     Hypertension Brother     No Known Problems Son     No Known Problems Son     Breast cancer Neg Hx     Ovarian cancer Neg Hx     Uterine cancer Neg Hx     Colon cancer Neg Hx     Malig Hyperthermia Neg Hx          SOCIAL HISTORY  Social History     Socioeconomic History    Marital status:      Spouse name: TRACEY    Number of children: 4   Tobacco Use    Smoking status: Never     Passive exposure: Never    Smokeless tobacco: Never   Vaping Use    Vaping status: Never Used   Substance and Sexual Activity    Alcohol use: No    Drug use: No    Sexual activity: Yes     Partners: Male     Birth control/protection: Post-menopausal     Comment: spouse = TRACEY          ALLERGIES  Patient has no known allergies.      REVIEW OF SYSTEMS  Review of Systems  Included in HPI  All systems reviewed and negative except for those discussed in HPI.      PHYSICAL EXAM    I have reviewed the triage vital signs and nursing notes.    ED Triage Vitals   Temp Heart Rate Resp BP SpO2   12/24/24 1129 12/24/24 1129 12/24/24 1129 12/24/24 1136 12/24/24 1129   98.4 °F (36.9 °C) 84 20 148/91 98 %      Temp src Heart Rate Source Patient Position BP Location FiO2 (%)   12/24/24 1129 12/24/24 1136 12/24/24 1136 12/24/24 1136 --   Tympanic Monitor Lying Right arm         Physical Exam  GENERAL: Awake, alert, no acute distress  SKIN: Warm, dry  HENT: Normocephalic, atraumatic  EYES: no scleral icterus  CV: regular rhythm, regular rate  RESPIRATORY: normal effort, lungs clear  ABDOMEN: soft, nontender, nondistended  MUSCULOSKELETAL: no deformity.  Mild bilateral pitting edema to the ankles.  No calf tenderness or swelling.  Surgical wound on the back is well-healing without any drainage or erythema.  NEURO: alert, moves all extremities, follows commands            LAB RESULTS  Recent Results (from the past 24 hours)   ECG 12 Lead Dyspnea    Collection Time: 12/24/24 11:51 AM   Result Value Ref Range    QT Interval 352 ms    QTC Interval 409 ms   Comprehensive Metabolic Panel    Collection Time: 12/24/24 12:04 PM    Specimen: Blood   Result Value Ref Range    Glucose 167 (H) 65 - 99 mg/dL    BUN 7 (L) 8 - 23 mg/dL    Creatinine 0.74 0.57 - 1.00 mg/dL    Sodium 133 (L) 136 - 145 mmol/L    Potassium 3.9 3.5 - 5.2 mmol/L    Chloride 101 98 - 107 mmol/L    CO2 22.4 22.0 - 29.0 mmol/L    Calcium 9.2 8.6 - 10.5 mg/dL    Total Protein 6.0 6.0 - 8.5 g/dL    Albumin 3.4 (L) 3.5 - 5.2 g/dL    ALT (SGPT) 18 1 - 33 U/L    AST (SGOT) 23 1 - 32 U/L    Alkaline Phosphatase 81 39 - 117 U/L    Total Bilirubin 0.2 0.0 - 1.2 mg/dL    Globulin 2.6 gm/dL    A/G Ratio 1.3 g/dL    BUN/Creatinine Ratio 9.5 7.0 - 25.0    Anion Gap 9.6 5.0 - 15.0 mmol/L    eGFR 84.5 >60.0 mL/min/1.73   High Sensitivity Troponin T    Collection Time: 12/24/24 12:04 PM    Specimen: Blood   Result Value Ref Range    HS Troponin T 12 <14 ng/L   BNP    Collection Time: 12/24/24 12:04 PM    Specimen: Blood   Result Value Ref Range    proBNP 127.0 0.0 - 1,800.0 pg/mL   CBC Auto Differential    Collection Time: 12/24/24 12:04 PM    Specimen: Blood   Result Value Ref Range    WBC 4.47 3.40 - 10.80 10*3/mm3    RBC 3.77 3.77 - 5.28 10*6/mm3    Hemoglobin 11.3 (L) 12.0 - 15.9 g/dL    Hematocrit 35.0 34.0 - 46.6 %    MCV 92.8 79.0 -  97.0 fL    MCH 30.0 26.6 - 33.0 pg    MCHC 32.3 31.5 - 35.7 g/dL    RDW 13.3 12.3 - 15.4 %    RDW-SD 45.4 37.0 - 54.0 fl    MPV 9.1 6.0 - 12.0 fL    Platelets 187 140 - 450 10*3/mm3    Neutrophil % 63.1 42.7 - 76.0 %    Lymphocyte % 17.0 (L) 19.6 - 45.3 %    Monocyte % 15.7 (H) 5.0 - 12.0 %    Eosinophil % 2.7 0.3 - 6.2 %    Basophil % 0.4 0.0 - 1.5 %    Immature Grans % 1.1 (H) 0.0 - 0.5 %    Neutrophils, Absolute 2.82 1.70 - 7.00 10*3/mm3    Lymphocytes, Absolute 0.76 0.70 - 3.10 10*3/mm3    Monocytes, Absolute 0.70 0.10 - 0.90 10*3/mm3    Eosinophils, Absolute 0.12 0.00 - 0.40 10*3/mm3    Basophils, Absolute 0.02 0.00 - 0.20 10*3/mm3    Immature Grans, Absolute 0.05 0.00 - 0.05 10*3/mm3    nRBC 0.0 0.0 - 0.2 /100 WBC   Duplex Venous Lower Extremity - Bilateral CAR    Collection Time: 12/24/24 12:54 PM   Result Value Ref Range    Right Common Femoral Spont Y     Right Common Femoral Competent Y     Right Common Femoral Phasic Y     Right Common Femoral Compress C     Right Common Femoral Augment Y     Right Saphenofemoral Junction Compress C     Right Profunda Femoral Compress C     Right Proximal Femoral Compress C     Right Mid Femoral Spont Y     Right Mid Femoral Competent Y     Right Mid Femoral Phasic Y     Right Mid Femoral Compress C     Right Mid Femoral Augment Y     Right Distal Femoral Compress C     Right Popliteal Spont Y     Right Popliteal Competent Y     Right Popliteal Phasic Y     Right Popliteal Compress C     Right Popliteal Augment Y     Right Posterior Tibial Compress C     Right Peroneal Compress C     Right Gastronemius Compress C     Right Greater Saph AK Compress C     Right Greater Saph BK Compress C     Right Lesser Saph Compress C     Left Common Femoral Spont Y     Left Common Femoral Competent Y     Left Common Femoral Phasic Y     Left Common Femoral Compress C     Left Common Femoral Augment Y     Left Saphenofemoral Junction Compress C     Left Profunda Femoral Compress C      Left Proximal Femoral Compress C     Left Mid Femoral Spont Y     Left Mid Femoral Competent Y     Left Mid Femoral Phasic Y     Left Mid Femoral Compress C     Left Mid Femoral Augment Y     Left Distal Femoral Compress C     Left Popliteal Spont Y     Left Popliteal Competent Y     Left Popliteal Phasic Y     Left Popliteal Compress C     Left Popliteal Augment Y     Left Posterior Tibial Compress C     Left Peroneal Compress C     Left Gastronemius Compress C     Left Greater Saph AK Compress C     Left Greater Saph BK Compress C     Left Lesser Saph Compress C     BH CV VAS PRELIMINARY FINDINGS SCRIPTING 1.0          RADIOLOGY  XR Chest 1 View    Result Date: 12/24/2024  XR CHEST 1 VW-  HISTORY: Female who is 74 years-old, short of breath, leg swelling  TECHNIQUE: Frontal view of the chest  COMPARISON: Correlated with CT from 10/3/2024  FINDINGS: The heart size is borderline. Aorta is calcified. Pulmonary vasculature is unremarkable. No focal pulmonary consolidation, pleural effusion, or pneumothorax. A sclerotic focus of the right anterior third rib on the CT from 8/30/2024, is also apparent on this exam. No acute osseous process.      No focal pulmonary consolidation. Borderline heart size. Follow-up as clinical indications persist.  This report was finalized on 12/24/2024 1:27 PM by Dr. Bharathi Walker M.D on Workstation: [id number]      Duplex Venous Lower Extremity - Bilateral CAR    Result Date: 12/24/2024    Normal bilateral lower extremity venous duplex scan.        MEDICATIONS GIVEN IN ER  Medications - No data to display      ORDERS PLACED DURING THIS VISIT:  Orders Placed This Encounter   Procedures    XR Chest 1 View    Comprehensive Metabolic Panel    High Sensitivity Troponin T    BNP    CBC Auto Differential    ECG 12 Lead Dyspnea    CBC & Differential         OUTPATIENT MEDICATION MANAGEMENT:  No current Epic-ordered facility-administered medications on file.     Current Outpatient Medications  Ordered in [facility]   Medication Sig Dispense Refill    acetaminophen (TYLENOL) 325 MG tablet Take 2 tablets by mouth Every 4 (Four) Hours As Needed for Mild Pain.      aspirin 81 MG chewable tablet Chew 1 tablet Daily.      HYDROcodone-acetaminophen (NORCO) 7.5-325 MG per tablet Take 1 tablet by mouth Every 4 (Four) Hours As Needed for Moderate Pain. 25 tablet 0    levothyroxine (SYNTHROID, LEVOTHROID) 100 MCG tablet Take 1 tablet by mouth Daily.      loratadine (CLARITIN) 10 MG tablet Take 1 tablet by mouth Daily As Needed.      metoprolol succinate XL (TOPROL-XL) 50 MG 24 hr tablet Take 1 tablet by mouth Daily.      multivitamin with minerals (MULTIVITAMIN ADULT PO) Take 1 tablet by mouth Daily. HOLDING FOR DOS      naloxone (NARCAN) 4 MG/0.1ML nasal spray Call 911. Don't prime. Shell in 1 nostril for overdose. Repeat in 2-3 minutes in other nostril if no or minimal breathing/responsiveness. 2 each 0    omeprazole (priLOSEC) 40 MG capsule Take 1 capsule by mouth Daily.      polyethylene glycol (MIRALAX) 17 g packet Take 17 g by mouth Daily As Needed (Use if senna-docusate is ineffective).      pravastatin (PRAVACHOL) 40 MG tablet TAKE 1 TABLET BY MOUTH DAILY (Patient taking differently: Take 1 tablet by mouth Every Night.) 90 tablet 3    sennosides-docusate (PERICOLACE) 8.6-50 MG per tablet Take 2 tablets by mouth 2 (Two) Times a Day As Needed for Constipation. 20 tablet 0    tiZANidine (ZANAFLEX) 4 MG tablet Take 1 tablet by mouth Every 6 (Six) Hours As Needed for Muscle Spasms. 30 tablet 0         PROCEDURES  Procedures            PROGRESS, DATA ANALYSIS, CONSULTS, AND MEDICAL DECISION MAKING  All labs have been independently interpreted by me.  All radiology studies have been reviewed by me. All EKG's have been independently viewed and interpreted by me.  Discussion below represents my analysis of pertinent findings related to patient's condition, differential diagnosis, treatment plan and final  disposition.    Differential diagnosis includes but is not limited to pitting edema, pitting dependent edema, CHF, renal failure, DVT.    Clinical Scores:                                       ED Course as of 12/24/24 1346   Tue Dec 24, 2024   1209 XR Chest 1 View  My independent interpretation of the imaging study is no effusions [TR]   1211 EKG          EKG time: 1151  Rhythm/Rate: Normal sinus, rate 81  P waves and IN: Normal P, normal IN  QRS, axis: Narrow QRS, normal axis  ST and T waves: No acute    Independently Interpreted by me  Not significantly changed compared to prior 12/13/2024   [TR]   1255 Discussing with vascular technician.  Ultrasound is negative for DVT. [TR]   1329 Patient's laboratory evaluation is unremarkable with a normal BNP, normal troponin, normal renal function.  She does have a mild anemia but she has postop and this is only 1 point lower than her normal.  Her chest x-ray does not show any effusions or infiltrate.  Her lower extremity ultrasound does not show a DVT.  I do suspect she has lower extremity edema secondary to decreased mobility secondary to her recent surgery and ambulation and feet elevation will assist in resolving it. [TR]   1346 Reviewed the workup and findings with the patient.  She is agreeable with the plan. [TR]      ED Course User Index  [TR] Brandon Nelson MD             AS OF 13:46 EST VITALS:    BP - 148/91  HR - 86  TEMP - 98.4 °F (36.9 °C) (Tympanic)  O2 SATS - 97%    COMPLEXITY OF CARE  Admission was considered but after careful review of the patient's presentation, physical examination, diagnostic results, and response to treatment the patient may be safely discharged with outpatient follow-up.      DIAGNOSIS  Final diagnoses:   Bilateral lower extremity edema   Post-operative state   Hyperglycemia         DISPOSITION  ED Disposition       ED Disposition   Discharge    Condition   Stable    Comment   --                Please note that portions of this  document were completed with a voice recognition program.    Note Disclaimer: At [facility], we believe that sharing information builds trust and better relationships. You are receiving this note because you recently visited [facility]. It is possible you will see health information before a provider has talked with you about it. This kind of information can be easy to misunderstand. To help you fully understand what it means for your health, we urge you to discuss this note with your provider.         Brandon Nelson MD  12/24/24 1733       Brandon Nelson MD  12/24/24 [id number]

## 2024-12-24 NOTE — ED TRIAGE NOTES
Pt had back surgery on Monday. Reports swelling to bilateral lower extremity swelling and soa on exertion

## 2024-12-24 NOTE — TELEPHONE ENCOUNTER
Per Dr MARKS, patient is to go to the ER. I spoke with patient and she is aware. Patient stated that she is on her way.

## 2024-12-24 NOTE — DISCHARGE INSTRUCTIONS
Keep your legs elevated when possible to help reduce the swelling.  Ambulate to help reduce swelling.  Follow-up with your primary care doctor for further evaluation.  Return for any shortness of breath, passing out, chest pain.  Follow-up with your primary care doctor regarding your elevated blood sugar.

## 2024-12-26 DIAGNOSIS — M62.81 CALF MUSCLE WEAKNESS: ICD-10-CM

## 2024-12-26 DIAGNOSIS — M48.062 SPINAL STENOSIS OF LUMBAR REGION WITH NEUROGENIC CLAUDICATION: ICD-10-CM

## 2024-12-26 RX ORDER — METHYLPREDNISOLONE 4 MG/1
TABLET ORAL
Qty: 21 TABLET | Refills: 0 | Status: SHIPPED | OUTPATIENT
Start: 2024-12-26

## 2024-12-26 RX ORDER — HYDROCODONE BITARTRATE AND ACETAMINOPHEN 7.5; 325 MG/1; MG/1
1 TABLET ORAL EVERY 6 HOURS PRN
Qty: 20 TABLET | Refills: 0 | Status: SHIPPED | OUTPATIENT
Start: 2024-12-26 | End: 2024-12-31

## 2024-12-26 NOTE — TELEPHONE ENCOUNTER
Please contact the patient and let her know that I am sending in a refill to [facility] on the [address] and she should make a very conscious effort to try to take it every 6 hours not every 4 hours.  In addition I am forwarding a Medrol Dosepak for her to take and hopefully this will help with the leg symptoms that she is having.  Explained that the nerve pain is slow to improve hopefully the steroids will help some.  Encouraged her to get up and walk is much as possible.  Make sure she is aware of her postoperative appointment in the office and that we will address any further symptom complaints again at that appointment.

## 2024-12-26 NOTE — TELEPHONE ENCOUNTER
Patient requesting pain medication, had an open bilateral lumbar decompression/discectomy lumbar four/five, lumbar five/sacral one with Dr. MARKS on 12/17/2024.

## 2024-12-26 NOTE — TELEPHONE ENCOUNTER
When did this start?Since left hospital     Any injury? (Details to what happened)NO     Leg pain? (Left/Right/Bilateral)Bilateral edema     Numbness, tingling and/or weakness present?comes and goes  since surgery     Any incontinence issues?no     Are you taking any medications for this?3 pills hydrocodone     Any recent imaging done?yes er     Any recent surgery? (If so, when and who's the surgeon)DR. Terrazas 12/17    Pain is on back and legs. Patient states she can hardly walk. Patient was sent to ER  and they did do work up there but no pain medication was given in ER . Please send to [facility]

## 2024-12-31 NOTE — PROGRESS NOTES
Subjective   Patient ID: Imani Shankar is a 74 y.o. female Patient is here for a 3 week post op check after undergoing open bilateral lumbar decompression/discectomy lumbar four/five, lumbar five/sacral one with Dr. Terrazas on 12/17/24    History of Present Illness       HPI: This is a 74-year-old female who is accompanied by her son who is in the room today as well as another son who is a neuroradiologist who practices at [facility] in [place].  She was seen initially by neurosurgery as a consultation December 4, 2024 for worsening low back pain since October and electric shock sensations down both of her legs.  She had received a lumbar epidural injection 10 days prior to that with no relief in symptoms.  She was subsequently taken to the operating room 12/17/2024 by Dr. Terrazas for open bilateral lumbar decompression/discectomy at L4-5 and L5-S1.  She was discharged 12/19 and doing much better at that time.  Since then she developed some worsening pain in her right leg and was evaluated in the emergency room 12/24/2024.  Lower extremity venous Dopplers were negative for DVT.  She reports that the swelling has improved.  Her complaint is of continued radiculitis in both lower legs in the L5 distribution primarily on the right.  She also complains of associated low back pain.  She was prescribed a Medrol Dosepak since her surgery and did not notice much improvement in symptoms.  The pain in her leg is equal in severity compared to the pain in her low back.  The pain is worsened with prolonged standing, sitting, walking.  Does not occur often, she states yesterday the nerve related discomfort in the right leg turned to an icy cold sensation.  It has since resolved after applying a heating pad.  She denies any incisional swelling, redness or drainage.    Review of Systems   Constitutional:  Negative for chills and fever.   Respiratory:  Negative for shortness of breath.    Cardiovascular:  Negative for chest  "pain and leg swelling.        Was seen in the ER at [facility] 12/20/2024 for complaints of swelling and discomfort in the right leg.  Venous Doppler studies were negative for DVT.  Patient states that the leg swelling has since resolved.   Gastrointestinal: Negative.         No history of bowel incontinence   Endocrine: Negative.    Genitourinary:  Negative for difficulty urinating, dysuria and urgency.        No history of bladder incontinence.   Musculoskeletal:  Positive for back pain.   Neurological:  Positive for numbness.        Reports persistent tingling, paresthesia type pain in both lower legs below the knee and into the ankle.  The symptoms involve the L5 distribution bilaterally.  The patient reports that the right leg symptoms are worse than the left.  Due to the symptoms, the patient is unable to stand, walk, or sit for long periods.   Hematological: Negative.    Psychiatric/Behavioral: Negative.     All other systems reviewed and are negative.      Objective     Vitals:    01/09/25 1319   BP: 122/80   BP Location: Left arm   Patient Position: Sitting   Cuff Size: Adult   Pulse: 70   Resp: 20   Temp: 98 °F (36.7 °C)   TempSrc: Temporal   SpO2: 98%   Weight: 67.1 kg (148 lb)   Height: 154.9 cm (60.98\")   PainSc:   5   PainLoc: Back     Body mass index is 27.98 kg/m².    Tobacco Use: Low Risk  (1/9/2025)    Patient History     Smoking Tobacco Use: Never     Smokeless Tobacco Use: Never     Passive Exposure: Never          Physical Exam  Constitutional:       General: She is not in acute distress.     Appearance: Normal appearance. She is well-developed. She is not ill-appearing.   Musculoskeletal:         General: No swelling or tenderness.      Right lower leg: No edema.      Left lower leg: No edema.      Comments: No calf swelling or tenderness to bilateral palpation.   Skin:     General: Skin is warm and dry.      Comments: Lumbar incision is well approximated; practically healed. No " redness, swelling or drainage.    Neurological:      Mental Status: She is alert and oriented to person, place, and time.      Sensory: Sensory deficit present.      Motor: No weakness.      Gait: Gait normal.      Comments: The patient has increased sensitivity to light touch in the right lateral calf.  Bilateral iliopsoas, quadricep, EHL, tibialis anterior 5/5.  Right gastroc 4+/5.  Preoperatively the motor exam was the same except the right gastroc was 3/5.  Able to bear weight on heels and toes bilaterally however the patient exhibits some mild difficulty with heel walking on the right.  Straight leg raise was negative bilaterally at 30 degrees.       Physical Exam       Results      Assessment & Plan   Medical Decision Making:      Ms. Shankar is in the office today for postoperative follow-up.  She and her sons are a bit concerned about the worsening discomfort in the legs particularly on the right.  The patient did have rather severe stenosis at L4-5 with neurogenic claudication and a superimposed disc herniation at L5-S1 contributing to radicular pain and some component of right gastroc weakness.  At any rate, the patient felt some better initially after surgery however since returning home she has begun to notice worsening pain in the low back and both of the legs right greater than left particularly with standing, sitting and walking.  I reminded the patient and her sons that this is not an uncommon occurrence this soon after surgery however the patient had no symptoms of radiculopathy until just before her presentation to the hospital.    We will get an MRI of the lumbar spine with and without contrast as well as lumbar flexion-extension films and have the patient present to the office thereafter for reevaluation.    In the interim, the patient will try some gabapentin.  She was informed that the starting dose will be light and we will have to build up the dose to effect.  She is also aware that she should  take the medication as prescribed not on an as-needed basis in order to build up to a therapeutic level.  She will try the muscle relaxers for back pain symptoms.  She was also encouraged to take it easy and limit the walking distance especially if it makes her uncomfortable.  In the interim we will go ahead and enter an order for physical therapy to get that started as soon as possible.      Diagnoses and all orders for this visit:    1. Surgical followup visit (Primary)  -     Ambulatory Referral to Physical Therapy for Evaluation & Treatment  -     MRI Lumbar Spine With & Without Contrast; Future  -     XR Spine Lumbar AP & Lateral With Flex & Ext; Future  -     HYDROcodone-acetaminophen (NORCO) 7.5-325 MG per tablet; Take 1 tablet by mouth Every 8 (Eight) Hours As Needed for Moderate Pain or Severe Pain for up to 5 days.  Dispense: 15 tablet; Refill: 0  -     gabapentin (NEURONTIN) 100 MG capsule; Take 1 capsule by mouth 2 (Two) Times a Day.  Dispense: 60 capsule; Refill: 1    2. Spinal stenosis of lumbar region with neurogenic claudication    3. Herniation of lumbar intervertebral disc with radiculopathy    4. Lumbar radiculitis right greater than left  -     MRI Lumbar Spine With & Without Contrast; Future  -     gabapentin (NEURONTIN) 100 MG capsule; Take 1 capsule by mouth 2 (Two) Times a Day.  Dispense: 60 capsule; Refill: 1    5. Lumbar back pain  -     XR Spine Lumbar AP & Lateral With Flex & Ext; Future    Other orders  -     tiZANidine (ZANAFLEX) 4 MG tablet; Take 1 tablet by mouth Every 8 (Eight) Hours As Needed for Muscle Spasms.  Dispense: 30 tablet; Refill: 0      Assessment & Plan      Return for after radiographic imaging, Dr. Terrazas or Elizabeth Jama on a day Dr. Terrazas is in office.

## 2025-01-08 DIAGNOSIS — Z76.0 REPEAT PRESCRIPTION ISSUE: Primary | ICD-10-CM

## 2025-01-08 RX ORDER — LEVOTHYROXINE SODIUM 100 UG/1
100 TABLET ORAL DAILY
Qty: 90 TABLET | Refills: 0 | Status: SHIPPED | OUTPATIENT
Start: 2025-01-08

## 2025-01-08 RX ORDER — LEVOTHYROXINE SODIUM 100 UG/1
100 TABLET ORAL DAILY
Qty: 90 TABLET | Refills: 0 | Status: SHIPPED | OUTPATIENT
Start: 2025-01-08 | End: 2025-01-08 | Stop reason: SDUPTHER

## 2025-01-09 ENCOUNTER — OFFICE VISIT (OUTPATIENT)
Dept: NEUROSURGERY | Facility: CLINIC | Age: 75
End: 2025-01-09
Payer: MEDICARE

## 2025-01-09 VITALS
HEIGHT: 61 IN | OXYGEN SATURATION: 98 % | SYSTOLIC BLOOD PRESSURE: 122 MMHG | BODY MASS INDEX: 27.94 KG/M2 | HEART RATE: 70 BPM | WEIGHT: 148 LBS | DIASTOLIC BLOOD PRESSURE: 80 MMHG | RESPIRATION RATE: 20 BRPM | TEMPERATURE: 98 F

## 2025-01-09 DIAGNOSIS — M48.062 SPINAL STENOSIS OF LUMBAR REGION WITH NEUROGENIC CLAUDICATION: ICD-10-CM

## 2025-01-09 DIAGNOSIS — M54.16 LUMBAR RADICULITIS: ICD-10-CM

## 2025-01-09 DIAGNOSIS — M51.16 HERNIATION OF LUMBAR INTERVERTEBRAL DISC WITH RADICULOPATHY: ICD-10-CM

## 2025-01-09 DIAGNOSIS — M54.50 LUMBAR BACK PAIN: ICD-10-CM

## 2025-01-09 DIAGNOSIS — Z09 SURGICAL FOLLOWUP VISIT: Primary | ICD-10-CM

## 2025-01-09 RX ORDER — GABAPENTIN 100 MG/1
100 CAPSULE ORAL 2 TIMES DAILY
Qty: 60 CAPSULE | Refills: 1 | Status: SHIPPED | OUTPATIENT
Start: 2025-01-09

## 2025-01-09 RX ORDER — HYDROCODONE BITARTRATE AND ACETAMINOPHEN 7.5; 325 MG/1; MG/1
1 TABLET ORAL EVERY 8 HOURS PRN
Qty: 15 TABLET | Refills: 0 | Status: SHIPPED | OUTPATIENT
Start: 2025-01-09 | End: 2025-01-14

## 2025-01-13 ENCOUNTER — TELEPHONE (OUTPATIENT)
Dept: NEUROSURGERY | Facility: CLINIC | Age: 75
End: 2025-01-13
Payer: MEDICARE

## 2025-01-14 DIAGNOSIS — Z76.0 REPEAT PRESCRIPTION ISSUE: Primary | ICD-10-CM

## 2025-01-14 RX ORDER — MULTIVITAMIN
1 CAPSULE ORAL DAILY
Qty: 30 CAPSULE | Refills: 11 | Status: SHIPPED | OUTPATIENT
Start: 2025-01-14 | End: 2026-01-14

## 2025-01-21 NOTE — PROGRESS NOTES
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  Unfortunately in the interval since her last visit she has had multiple issues develop.  In regards to her pulmonary infiltrates seen on prior CT scans, we did refer her to pulmonary and she was seen by Dr. Carias on 9/19/2024.  She did have fungal serologies performed on 9/20/2024 which were negative.  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  There was discussion regarding potential use of steroids however ultimately this was not pursued.  The patient had remained asymptomatic.  She did have some evidence of restrictive lung disease by PFTs.  The patient experienced a fall on 11/1/2024.  Thereafter she had ongoing back pain and was seen by neurosurgery after having undergone MRI studies of the lumbar spine and pelvis.  She had significant spinal stenosis and disc disease.  Ultimately she required surgery with Dr. Terrazas on 12/17/2024 with decompression at L4/5, L5/S1, and L5/S1 discectomy.  The patient has been recovering from her surgery.  Overall she has done quite well.  She is ambulating without any assistance.  Her back pain is improving.  She still has numbness in her right leg and will be undergoing follow-up MRI on 1/30/2025 and subsequent follow-up with neurosurgery.  She continues without any respiratory symptoms, denies any cough or shortness of breath.  She notes that night sweats are occurring very infrequently and are minor in nature if they do occur possibly once per month.  She notes a normal appetite.      Objective   Vital Signs:   /77   Pulse 72   Temp 98.2 °F (36.8 °C) (Oral)   Ht  "154.9 cm (60.98\")   Wt 68.5 kg (151 lb 1.6 oz)   SpO2 96%   BMI 28.57 kg/m²     Physical Exam  Constitutional:       Appearance: She is well-developed.   Eyes:      Conjunctiva/sclera: Conjunctivae normal.   Neck:      Thyroid: No thyromegaly.      Comments: Submandibular lymph nodes have decreased in size, now 1 cm, symmetric.  No palpable lymphadenopathy  Cardiovascular:      Rate and Rhythm: Normal rate and regular rhythm.      Heart sounds: No murmur heard.     No friction rub. No gallop.   Pulmonary:      Effort: No respiratory distress.      Breath sounds: Normal breath sounds.   Abdominal:      General: Bowel sounds are normal. There is no distension.      Palpations: Abdomen is soft.      Tenderness: There is no abdominal tenderness.   Lymphadenopathy:      Comments: No palpable axillary lymphadenopathy   Skin:     General: Skin is warm and dry.      Findings: No rash.   Neurological:      Mental Status: She is alert and oriented to person, place, and time.      Cranial Nerves: No cranial nerve deficit.      Motor: No abnormal muscle tone.      Deep Tendon Reflexes: Reflexes normal.   Psychiatric:         Behavior: Behavior normal.            Result Review : Reviewed CBC, CMP, LDH from today.  Reviewed neurosurgery records including operative note, MRI studies.  Reviewed high-resolution chest CT from 10/3/2024.  Reviewed pulmonary records.      Assessment and Plan     *SLL  Patient reported a 3 to 4-month history of night sweats occurring most nights, often drenching in nature.  During this timeframe she also experienced increase in fatigue.  She did note normal appetite and stable weight.  She had remained active, ECOG performance status of 0.  The patient noted swelling in submandibular gland particularly on the right that was occurring over a 2 to 3-month period of time.    The patient was seen by Dr. Felder in ENT.  CT neck on 2/9/2024 which showed prominence of the palatine tonsils likely " hyperplastic.  Vague heterogeneous slight increased enhancement in right gland right greater than left.  Submandibular space symmetric and normal appearance.  Pathologic lymph node left posterior cervical fat posterior to the left internal jugular vein measuring 1.5 x 1.3 x 2.1 cm (previous 1.0 x 0.8 x 1.4 cm on 4/2/2019).  Infrahyoid left posterior cervical triangle lymph node 1.2 x 0.9 x 1.7 cm and additional enlarged lymph node 1.4 x 1.0 x 1.7.  Ultrasound-guided biopsy of cervical lymph node on process 1/24.  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  Ki-67 estimated by pathology at [facility] at 18%.  Hematopathology review with flow cytometry that showed CD5 positive,  positive B-cell lymphoma, favor CLL/SLL.  Ki-67 on review by pathology less than 10% (within neoplastic areas).  Insufficient material for BCL1 FISH and CLL FISH panel.  Labs on 4/23/2024 with WBC normal at 6.28 with differential of 40 segs, 38 lymphs, 13 monocytes, 6 eosinophils with hemoglobin 13.7 and platelet count 207,000  Peripheral blood flow cytometry 4/23/2024 was negative, no evidence of peripheral blood involvement by SLL.  IGVH therefore with no result  Beta-2 microglobulin on 4/23/2024 borderline elevated at 2.3  CT neck chest abdomen pelvis 4/26/2024 showed no change in small lymphadenopathy in the cervical region compared to 2/29/2024 with lymph nodes up to 1.5 cm in the left posterior cervical triangle and on the right up to 1.5 cm.  In the chest, small mediastinal and left hilar lymph nodes were stable to slightly decreased in size compared to prior CT scan 5/4/2022, maximum size 2.2 cm AP window, infracarinal 1.9 cm.  No adenopathy in the abdomen or pelvis, normal spleen.  Ultrasound-guided repeat biopsy left cervical lymph node on 4/30/2024 with pathology that again showed CD5 positive non-Hodgkin's B-cell lymphoma comprising 54% of cells with lambda light chain expression.  CLL/SLL FISH panel with  trisomy 12.  Patient with low volume lymphadenopathy related to SLL involving cervical lymph nodes, suspect involvement in mediastinal and left hilar lymph nodes as well.  Patient appears to have indolent low-volume disease, low risk.  Unclear whether her night sweats/hot flashes are related to her disease given the limited lymphadenopathy and disease volume.  Recommended to pursue an initial course of observation/surveillance.    Patient was referred back to Dr. Felder in ENT who performed subsequent evaluation of the tonsils with no abnormal findings reported  CT neck chest abdomen pelvis on 8/30/2024 with no change in borderline submandibular, cervical, mediastinal lymph nodes.  Notation of increased bilateral pulmonary groundglass opacifications  High-resolution chest CT on 10/3/2024 with mention of mildly prominent left supraclavicular lymph nodes (9 mm), no mention of lymphadenopathy.  Patient returns today in follow-up with no clinical evidence of progression of CLL/SLL.  There are no palpable lymphadenopathy on exam, submandibular lymph nodes have regressed, currently only 1 cm and symmetric.  No palpable cervical lymphadenopathy nor axillary lymphadenopathy.  She is asymptomatic from the standpoint of the lymphoma.  WBC normal at 6.74 with normal differential.  LDH unable to elevated at 251 of unclear significance.  There is no indication to proceed with treatment at this time.  Patient will return for MD visit in 2 to 3 months with CT neck chest abdomen pelvis 1 week prior.    *History of gallbladder cancer in 2012 stage IIA ([id number])  The patient had previous gallbladder cancer in 2012.    Surgical resection with cholecystectomy on 1/9/2012 with Dr. Tavera.    Pathology showed invasive papillary adenocarcinoma involving body of gallbladder contralateral to liver measuring 4.5 x 3.0 x 1.5 cm, moderately differentiated, invading into muscular layer and extending to perimuscular connective tissue.   Negative serosal and cystic duct margins.  No angiolymphatic invasion nor perineural invasion.  Additional finding of high-grade dysplasia in adenomatous polyp and chronic cholecystitis.  1 portal lymph node negative for involvement, pT2a, N0 stage IIA.    Patient received adjuvant therapy under the direction of Dr. Lanier in medical oncology and Dr. Kraft in radiation oncology at [facility].    She received adjuvant radiation with concurrent 5-FU and subsequent cisplatin and Gemzar chemotherapy.  Patient completed treatment in July 2012.  She has had no evidence of recurrent disease.    *History of meningioma  Patient has been monitored by neurosurgery for a meningioma  Most recent MRI brain 11/2/2021 with stable findings of densely calcified 1.9 cm meningioma involving the falx medial to right occipital lobe leading to mild to moderate narrowing of superior sagittal sinus.    Follow-up planned at 10-year interval.    *History of TIA  Patient with history of TIA in 2019  She continues on aspirin 81 mg daily    *GERD, history of GI bleed from duodenal ulcer  Previous GI bleed related to duodenal ulcer in 2018 that was NSAID induced.  Patient continues on Prilosec 40 mg daily    *Hypothyroidism  Patient continues on levothyroxine 100 mcg daily  With patient's ongoing hot flashes/night sweats, labs obtained on 5/7/2024 TSH 0.337, free T4 borderline elevated at 1.73.  Patient was asked to follow-up with Dr. Carter.  Repeat thyroid studies on 7/2/2024 with normal findings, TSH 0.322 and free T4 of 1.61    *Pulmonary groundglass opacities  Patient with prior CT scans noting some minor pulmonary parenchymal abnormalities  CT chest on 8/30/2024 with increase in bilateral diffuse groundglass opacities  Patient was asymptomatic from a pulmonary standpoint.   Patient was seen by by Dr. Carias in pulmonary on 9/19/2024.    Patient had evidence of restrictive lung disease by PFTs  Fungal serologies 9/20/2024 were  negative.    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    There was discussion regarding potential use of steroids however ultimately this was not pursued.   The patient today continues with no pulmonary symptoms.  He notes that there was mention by Dr. Carias of follow-up high-resolution chest CT and we will order high-resolution chest CT protocol to be performed with next CT scans prior to return visit in 2-3 months.    *Lumbar spinal stenosis and disc disease  Patient experienced a fall 11/1/2024 with exacerbation of back pain.  On 12/17/2024, patient underwent surgery with Dr. Terrazas with decompression L4/5 and L5/S1 along with discectomy L5/S1  Patient is continuing to recover from recent surgery.  She has residual numbness in her right lower extremity and will be undergoing repeat MRI lumbar spine per neurosurgery on 1/30/2025.    Plan:  Patient continues on a course of observation/surveillance regarding SLL.  Decrease in palpable submandibular lymph nodes on exam, no other palpable current lymphadenopathy  Patient will continue follow-up with neurosurgery, MRI lumbar spine scheduled for 1/30/2025  Patient will continue follow-up with pulmonary  MD visit in 2 to 3 months with CBC, CMP, LDH.  1 week prior CT neck chest abdomen and pelvis.  CT chest will be performed with high-resolution protocol.    I did spend 40 minutes total time caring for the patient today, time spent in review of records, face-to-face consultation, coordination of care, placement of orders, completion of documentation.

## 2025-01-22 ENCOUNTER — OFFICE VISIT (OUTPATIENT)
Dept: ONCOLOGY | Facility: CLINIC | Age: 75
End: 2025-01-22
Payer: MEDICARE

## 2025-01-22 ENCOUNTER — HOSPITAL ENCOUNTER (OUTPATIENT)
Dept: GENERAL RADIOLOGY | Facility: HOSPITAL | Age: 75
Discharge: HOME | End: 2025-01-22
Admitting: NURSE PRACTITIONER
Payer: MEDICARE

## 2025-01-22 ENCOUNTER — LAB (OUTPATIENT)
Dept: LAB | Facility: HOSPITAL | Age: 75
End: 2025-01-22
Payer: MEDICARE

## 2025-01-22 VITALS
TEMPERATURE: 98.2 F | DIASTOLIC BLOOD PRESSURE: 77 MMHG | OXYGEN SATURATION: 96 % | HEART RATE: 72 BPM | WEIGHT: 151.1 LBS | BODY MASS INDEX: 28.53 KG/M2 | SYSTOLIC BLOOD PRESSURE: 135 MMHG | HEIGHT: 61 IN

## 2025-01-22 DIAGNOSIS — C83.00 SMALL LYMPHOCYTIC LYMPHOMA: ICD-10-CM

## 2025-01-22 DIAGNOSIS — Z09 SURGICAL FOLLOWUP VISIT: ICD-10-CM

## 2025-01-22 DIAGNOSIS — M54.50 LUMBAR BACK PAIN: ICD-10-CM

## 2025-01-22 DIAGNOSIS — C83.00 SMALL LYMPHOCYTIC LYMPHOMA: Primary | ICD-10-CM

## 2025-01-22 LAB
ALBUMIN SERPL-MCNC: 3.8 G/DL (ref 3.5–5.2)
ALBUMIN/GLOB SERPL: 1.2 G/DL
ALP SERPL-CCNC: 107 U/L (ref 39–117)
ALT SERPL W P-5'-P-CCNC: 17 U/L (ref 1–33)
ANION GAP SERPL CALCULATED.3IONS-SCNC: 10.9 MMOL/L (ref 5–15)
AST SERPL-CCNC: 24 U/L (ref 1–32)
BASOPHILS # BLD AUTO: 0.03 10*3/MM3 (ref 0–0.2)
BASOPHILS NFR BLD AUTO: 0.4 % (ref 0–1.5)
BILIRUB SERPL-MCNC: 0.3 MG/DL (ref 0–1.2)
BUN SERPL-MCNC: 9 MG/DL (ref 8–23)
BUN/CREAT SERPL: 12.5 (ref 7–25)
CALCIUM SPEC-SCNC: 9.4 MG/DL (ref 8.6–10.5)
CHLORIDE SERPL-SCNC: 101 MMOL/L (ref 98–107)
CO2 SERPL-SCNC: 24.1 MMOL/L (ref 22–29)
CREAT SERPL-MCNC: 0.72 MG/DL (ref 0.57–1)
DEPRECATED RDW RBC AUTO: 45 FL (ref 37–54)
EGFRCR SERPLBLD CKD-EPI 2021: 87.3 ML/MIN/1.73
EOSINOPHIL # BLD AUTO: 0.24 10*3/MM3 (ref 0–0.4)
EOSINOPHIL NFR BLD AUTO: 3.6 % (ref 0.3–6.2)
ERYTHROCYTE [DISTWIDTH] IN BLOOD BY AUTOMATED COUNT: 13.8 % (ref 12.3–15.4)
GLOBULIN UR ELPH-MCNC: 3.1 GM/DL
GLUCOSE SERPL-MCNC: 118 MG/DL (ref 65–99)
HCT VFR BLD AUTO: 38.4 % (ref 34–46.6)
HGB BLD-MCNC: 12.2 G/DL (ref 12–15.9)
IMM GRANULOCYTES # BLD AUTO: 0.03 10*3/MM3 (ref 0–0.05)
IMM GRANULOCYTES NFR BLD AUTO: 0.4 % (ref 0–0.5)
LDH SERPL-CCNC: 251 U/L (ref 135–214)
LYMPHOCYTES # BLD AUTO: 1.88 10*3/MM3 (ref 0.7–3.1)
LYMPHOCYTES NFR BLD AUTO: 27.9 % (ref 19.6–45.3)
MCH RBC QN AUTO: 28.4 PG (ref 26.6–33)
MCHC RBC AUTO-ENTMCNC: 31.8 G/DL (ref 31.5–35.7)
MCV RBC AUTO: 89.5 FL (ref 79–97)
MONOCYTES # BLD AUTO: 0.85 10*3/MM3 (ref 0.1–0.9)
MONOCYTES NFR BLD AUTO: 12.6 % (ref 5–12)
NEUTROPHILS NFR BLD AUTO: 3.71 10*3/MM3 (ref 1.7–7)
NEUTROPHILS NFR BLD AUTO: 55.1 % (ref 42.7–76)
NRBC BLD AUTO-RTO: 0 /100 WBC (ref 0–0.2)
PLATELET # BLD AUTO: 224 10*3/MM3 (ref 140–450)
PMV BLD AUTO: 9.6 FL (ref 6–12)
POTASSIUM SERPL-SCNC: 4.4 MMOL/L (ref 3.5–5.2)
PROT SERPL-MCNC: 6.9 G/DL (ref 6–8.5)
RBC # BLD AUTO: 4.29 10*6/MM3 (ref 3.77–5.28)
SODIUM SERPL-SCNC: 136 MMOL/L (ref 136–145)
WBC NRBC COR # BLD AUTO: 6.74 10*3/MM3 (ref 3.4–10.8)

## 2025-01-22 PROCEDURE — 85025 COMPLETE CBC W/AUTO DIFF WBC: CPT

## 2025-01-22 PROCEDURE — 80053 COMPREHEN METABOLIC PANEL: CPT

## 2025-01-22 PROCEDURE — 72110 X-RAY EXAM L-2 SPINE 4/>VWS: CPT

## 2025-01-22 PROCEDURE — 83615 LACTATE (LD) (LDH) ENZYME: CPT

## 2025-01-22 PROCEDURE — 36415 COLL VENOUS BLD VENIPUNCTURE: CPT

## 2025-01-22 RX ORDER — TRAMADOL HYDROCHLORIDE 50 MG/1
1 TABLET ORAL EVERY 6 HOURS
COMMUNITY
Start: 2025-01-02

## 2025-01-22 NOTE — LETTER
January 22, 2025     Chang Carter MD  [address]  [place]  [facility] [address]    Patient: Imani Shankar   YOB: 1950   Date of Visit: 1/22/2025     Dear Chang Carter MD:       Thank you for referring Imani Shankar to me for evaluation. Below are the relevant portions of my assessment and plan of care.    If you have questions, please do not hesitate to call me. I look forward to following Imani along with you.         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  Unfortunately in the interval since her last visit she has had multiple issues develop.  In regards to her pulmonary infiltrates seen on prior CT scans, we did refer her to pulmonary and she was seen by Dr. Carias on 9/19/2024.  She did have fungal serologies performed on 9/20/2024 which were negative.  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  There was discussion regarding potential use of steroids however ultimately this was not pursued.  The patient had remained asymptomatic.  She did have some evidence of restrictive lung disease by PFTs.  The patient experienced a fall on 11/1/2024.  Thereafter she had ongoing back pain and was seen by neurosurgery after having undergone MRI studies of the lumbar spine and pelvis.  She had significant spinal stenosis and disc disease.  Ultimately she required surgery with Dr. Terrazas on 12/17/2024 with decompression at L4/5, L5/S1, and L5/S1 discectomy.  The patient has been  "recovering from her surgery.  Overall she has done quite well.  She is ambulating without any assistance.  Her back pain is improving.  She still has numbness in her right leg and will be undergoing follow-up MRI on 1/30/2025 and subsequent follow-up with neurosurgery.  She continues without any respiratory symptoms, denies any cough or shortness of breath.  She notes that night sweats are occurring very infrequently and are minor in nature if they do occur possibly once per month.  She notes a normal appetite.      Objective  Vital Signs:   /77   Pulse 72   Temp 98.2 °F (36.8 °C) (Oral)   Ht 154.9 cm (60.98\")   Wt 68.5 kg (151 lb 1.6 oz)   SpO2 96%   BMI 28.57 kg/m²     Physical Exam  Constitutional:       Appearance: She is well-developed.   Eyes:      Conjunctiva/sclera: Conjunctivae normal.   Neck:      Thyroid: No thyromegaly.      Comments: Submandibular lymph nodes have decreased in size, now 1 cm, symmetric.  No palpable lymphadenopathy  Cardiovascular:      Rate and Rhythm: Normal rate and regular rhythm.      Heart sounds: No murmur heard.     No friction rub. No gallop.   Pulmonary:      Effort: No respiratory distress.      Breath sounds: Normal breath sounds.   Abdominal:      General: Bowel sounds are normal. There is no distension.      Palpations: Abdomen is soft.      Tenderness: There is no abdominal tenderness.   Lymphadenopathy:      Comments: No palpable axillary lymphadenopathy   Skin:     General: Skin is warm and dry.      Findings: No rash.   Neurological:      Mental Status: She is alert and oriented to person, place, and time.      Cranial Nerves: No cranial nerve deficit.      Motor: No abnormal muscle tone.      Deep Tendon Reflexes: Reflexes normal.   Psychiatric:         Behavior: Behavior normal.            Result Review: Reviewed CBC, CMP, LDH from today.  Reviewed neurosurgery records including operative note, MRI studies.  Reviewed high-resolution chest CT from " 10/3/2024.  Reviewed pulmonary records.      Assessment and Plan     *SLL  Patient reported a 3 to 4-month history of night sweats occurring most nights, often drenching in nature.  During this timeframe she also experienced increase in fatigue.  She did note normal appetite and stable weight.  She had remained active, ECOG performance status of 0.  The patient noted swelling in submandibular gland particularly on the right that was occurring over a 2 to 3-month period of time.    The patient was seen by Dr. Felder in ENT.  CT neck on 2/9/2024 which showed prominence of the palatine tonsils likely hyperplastic.  Vague heterogeneous slight increased enhancement in right gland right greater than left.  Submandibular space symmetric and normal appearance.  Pathologic lymph node left posterior cervical fat posterior to the left internal jugular vein measuring 1.5 x 1.3 x 2.1 cm (previous 1.0 x 0.8 x 1.4 cm on 4/2/2019).  Infrahyoid left posterior cervical triangle lymph node 1.2 x 0.9 x 1.7 cm and additional enlarged lymph node 1.4 x 1.0 x 1.7.  Ultrasound-guided biopsy of cervical lymph node on process 1/24.  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  Ki-67 estimated by pathology at [facility] at 18%.  Hematopathology review with flow cytometry that showed CD5 positive,  positive B-cell lymphoma, favor CLL/SLL.  Ki-67 on review by pathology less than 10% (within neoplastic areas).  Insufficient material for BCL1 FISH and CLL FISH panel.  Labs on 4/23/2024 with WBC normal at 6.28 with differential of 40 segs, 38 lymphs, 13 monocytes, 6 eosinophils with hemoglobin 13.7 and platelet count 207,000  Peripheral blood flow cytometry 4/23/2024 was negative, no evidence of peripheral blood involvement by SLL.  IGVH therefore with no result  Beta-2 microglobulin on 4/23/2024 borderline elevated at 2.3  CT neck chest abdomen pelvis 4/26/2024 showed no change in small lymphadenopathy in the cervical region  compared to 2/29/2024 with lymph nodes up to 1.5 cm in the left posterior cervical triangle and on the right up to 1.5 cm.  In the chest, small mediastinal and left hilar lymph nodes were stable to slightly decreased in size compared to prior CT scan 5/4/2022, maximum size 2.2 cm AP window, infracarinal 1.9 cm.  No adenopathy in the abdomen or pelvis, normal spleen.  Ultrasound-guided repeat biopsy left cervical lymph node on 4/30/2024 with pathology that again showed CD5 positive non-Hodgkin's B-cell lymphoma comprising 54% of cells with lambda light chain expression.  CLL/SLL FISH panel with trisomy 12.  Patient with low volume lymphadenopathy related to SLL involving cervical lymph nodes, suspect involvement in mediastinal and left hilar lymph nodes as well.  Patient appears to have indolent low-volume disease, low risk.  Unclear whether her night sweats/hot flashes are related to her disease given the limited lymphadenopathy and disease volume.  Recommended to pursue an initial course of observation/surveillance.    Patient was referred back to Dr. Felder in ENT who performed subsequent evaluation of the tonsils with no abnormal findings reported  CT neck chest abdomen pelvis on 8/30/2024 with no change in borderline submandibular, cervical, mediastinal lymph nodes.  Notation of increased bilateral pulmonary groundglass opacifications  High-resolution chest CT on 10/3/2024 with mention of mildly prominent left supraclavicular lymph nodes (9 mm), no mention of lymphadenopathy.  Patient returns today in follow-up with no clinical evidence of progression of CLL/SLL.  There are no palpable lymphadenopathy on exam, submandibular lymph nodes have regressed, currently only 1 cm and symmetric.  No palpable cervical lymphadenopathy nor axillary lymphadenopathy.  She is asymptomatic from the standpoint of the lymphoma.  WBC normal at 6.74 with normal differential.  LDH unable to elevated at 251 of unclear significance.   There is no indication to proceed with treatment at this time.  Patient will return for MD visit in 2 to 3 months with CT neck chest abdomen pelvis 1 week prior.    *History of gallbladder cancer in 2012 stage IIA ([id number])  The patient had previous gallbladder cancer in 2012.    Surgical resection with cholecystectomy on 1/9/2012 with Dr. Tavera.    Pathology showed invasive papillary adenocarcinoma involving body of gallbladder contralateral to liver measuring 4.5 x 3.0 x 1.5 cm, moderately differentiated, invading into muscular layer and extending to perimuscular connective tissue.  Negative serosal and cystic duct margins.  No angiolymphatic invasion nor perineural invasion.  Additional finding of high-grade dysplasia in adenomatous polyp and chronic cholecystitis.  1 portal lymph node negative for involvement, pT2a, N0 stage IIA.    Patient received adjuvant therapy under the direction of Dr. Lanier in medical oncology and Dr. Kraft in radiation oncology at [facility].    She received adjuvant radiation with concurrent 5-FU and subsequent cisplatin and Gemzar chemotherapy.  Patient completed treatment in July 2012.  She has had no evidence of recurrent disease.    *History of meningioma  Patient has been monitored by neurosurgery for a meningioma  Most recent MRI brain 11/2/2021 with stable findings of densely calcified 1.9 cm meningioma involving the falx medial to right occipital lobe leading to mild to moderate narrowing of superior sagittal sinus.    Follow-up planned at 10-year interval.    *History of TIA  Patient with history of TIA in 2019  She continues on aspirin 81 mg daily    *GERD, history of GI bleed from duodenal ulcer  Previous GI bleed related to duodenal ulcer in 2018 that was NSAID induced.  Patient continues on Prilosec 40 mg daily    *Hypothyroidism  Patient continues on levothyroxine 100 mcg daily  With patient's ongoing hot flashes/night sweats, labs obtained on 5/7/2024  TSH 0.337, free T4 borderline elevated at 1.73.  Patient was asked to follow-up with Dr. Carter.  Repeat thyroid studies on 7/2/2024 with normal findings, TSH 0.322 and free T4 of 1.61    *Pulmonary groundglass opacities  Patient with prior CT scans noting some minor pulmonary parenchymal abnormalities  CT chest on 8/30/2024 with increase in bilateral diffuse groundglass opacities  Patient was asymptomatic from a pulmonary standpoint.   Patient was seen by by Dr. Carias in pulmonary on 9/19/2024.    Patient had evidence of restrictive lung disease by PFTs  Fungal serologies 9/20/2024 were negative.    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    There was discussion regarding potential use of steroids however ultimately this was not pursued.   The patient today continues with no pulmonary symptoms.  He notes that there was mention by Dr. Carias of follow-up high-resolution chest CT and we will order high-resolution chest CT protocol to be performed with next CT scans prior to return visit in 2-3 months.    *Lumbar spinal stenosis and disc disease  Patient experienced a fall 11/1/2024 with exacerbation of back pain.  On 12/17/2024, patient underwent surgery with Dr. Terrazas with decompression L4/5 and L5/S1 along with discectomy L5/S1  Patient is continuing to recover from recent surgery.  She has residual numbness in her right lower extremity and will be undergoing repeat MRI lumbar spine per neurosurgery on 1/30/2025.    Plan:  Patient continues on a course of observation/surveillance regarding SLL.  Decrease in palpable submandibular lymph nodes on exam, no other palpable current lymphadenopathy  Patient will continue follow-up with neurosurgery, MRI lumbar spine scheduled for 1/30/2025  Patient will continue follow-up with pulmonary  MD visit in 2 to 3  months with CBC, CMP, LDH.  1 week prior CT neck chest abdomen and pelvis.  CT chest will be performed with high-resolution protocol.    I did spend 40 minutes total time caring for the patient today, time spent in review of records, face-to-face consultation, coordination of care, placement of orders, completion of documentation.

## 2025-01-30 ENCOUNTER — HOSPITAL ENCOUNTER (OUTPATIENT)
Dept: MRI IMAGING | Facility: HOSPITAL | Age: 75
Discharge: HOME | End: 2025-01-30
Admitting: NURSE PRACTITIONER
Payer: MEDICARE

## 2025-01-30 DIAGNOSIS — Z09 SURGICAL FOLLOWUP VISIT: ICD-10-CM

## 2025-01-30 DIAGNOSIS — M54.16 LUMBAR RADICULITIS: ICD-10-CM

## 2025-01-30 PROCEDURE — 25510000002 GADOBENATE DIMEGLUMINE 529 MG/ML SOLUTION: Performed by: NURSE PRACTITIONER

## 2025-01-30 PROCEDURE — 72158 MRI LUMBAR SPINE W/O & W/DYE: CPT

## 2025-01-30 PROCEDURE — A9577 INJ MULTIHANCE: HCPCS | Performed by: NURSE PRACTITIONER

## 2025-01-30 RX ADMIN — GADOBENATE DIMEGLUMINE 13 ML: 529 INJECTION, SOLUTION INTRAVENOUS at 21:46

## 2025-02-11 NOTE — PROGRESS NOTES
Subjective   Patient ID: Imani Shankar is a 74 y.o. female is here today for post operative follow-up.    History of Present Illness  History of Present Illness: Ms. Shankar is now 8 weeks out from open bilateral lumbar decompression and discectomy at L4-5 and L5-S1 by Dr. Terrazas December 17, 2024.  I saw her last in the office January 9, 2025 at which time she was complaining of significant pain in both of her legs.  The pain was described as an electric shock sensation.  She had presented to the emergency department 12/24/2024 where lower extremity Dopplers were negative for DVT.  Due to these complaints, MRI imaging of the lumbar spine with and without contrast was ordered.  She was started on a mild dose of gabapentin 100 mg twice daily at that time.    Today, Ms. Shankar reports improvement in the pain that she was having in the lower legs.  Her primary issue of discomfort now involves the gluteal region and right upper hip region.  The pain is constantly there and affects her whenever she changes position or while standing and walking.  She is still using the Norco as well as the muscle relaxers for relief of those symptoms.  She is also still taking the gabapentin 100 mg twice a day.       Review of Systems   Constitutional:  Positive for activity change. Negative for chills and fever.   HENT: Negative.     Eyes: Negative.    Respiratory: Negative.     Cardiovascular: Negative.    Gastrointestinal: Negative.    Endocrine: Negative.    Genitourinary: Negative.  Negative for difficulty urinating and frequency.   Musculoskeletal:  Positive for back pain.        Gluteal pain right greater than left as well as right upper to lateral hip pain.   Allergic/Immunologic: Negative.    Hematological: Negative.    Psychiatric/Behavioral: Negative.     All other systems reviewed and are negative.          Objective     Vitals:    02/12/25 1340   BP: 122/68   Pulse: 74   Temp: 97.6 °F (36.4 °C)   SpO2: 96%   Weight: 68.5 kg  (151 lb)   PainSc:   5     Body mass index is 28.55 kg/m².    Tobacco Use: Low Risk  (2/12/2025)    Patient History     Smoking Tobacco Use: Never     Smokeless Tobacco Use: Never     Passive Exposure: Never          Physical Exam  Constitutional:       General: She is not in acute distress.     Appearance: She is well-developed.   Skin:     General: Skin is warm and dry.      Comments: Lumbar incision is well healed.     Neurological:      Mental Status: She is oriented to person, place, and time.      Sensory: No sensory deficit.      Motor: No weakness.      Deep Tendon Reflexes: Reflexes normal.      Comments: No motor or sensory deficits in either lower extremity.  Negative Sadiq's bilaterally.  Negative straight leg raise bilaterally.  There is some tenderness to palpation of the right lateral hip as well as the upper right gluteal region.   Psychiatric:         Mood and Affect: Mood normal.       Neurological Exam  Mental Status   Oriented to person, place, and time.    Physical Exam      Results      Independent Review of Radiographic Studies:      I personally reviewed the images from the following studies.    MRI lumbar spine w/wo contrast dated 1/30/2025 showed multilevel degenerative changes with mild foraminal stenosis L1-2, mild canal and lateral recess narrowing at L2-3 secondary to degenerative osteophytes.  Moderate canal and lateral recess narrowing bilaterally at L3-4 secondary to degenerative osteophytes.  Decompressive laminectomy noted L4-5 and L5-S1.  No evidence of canal stenosis at either these levels.       Assessment & Plan   Medical Decision Making:      Ms. Shankar returns to the office today for reevaluation with new MRI images of the lumbar spine with and without contrast.  She is here today with her .  Her son, a neuroradiologist who resides in [facility] is also on speaker phone.    I discussed the patient's newer pain complaints and MRI images with Dr. Terrazas.   Dr. Terrazas performed surgery on the lower portions of the lumbar spine because the patient was only symptomatic in those areas at that time.  It appears as though she is now becoming symptomatic from the upper level lumbar stenosis at L2-3.    We will try to remain conservative if possible and refer the patient to Dr. Cheek for injection therapies possibly transforaminal injection at L2-3 on the right.  Dr. Cheek can also manage her pain as well.  For now I have renewed the prescription for Norco 7.5/325 mg and the Robaxin.  I have also increased the gabapentin from 100 mg to 200 mg twice daily.  We will have the patient return to the office for reevaluation with Dr. Terrazas in approximately 4 weeks.         Diagnoses and all orders for this visit:    1. Surgical followup visit (Primary)  -     gabapentin (NEURONTIN) 100 MG capsule; Take 2 capsules by mouth 2 (Two) Times a Day.  Dispense: 120 capsule; Refill: 2  -     Ambulatory Referral to Pain Management  -     HYDROcodone-acetaminophen (NORCO) 7.5-325 MG per tablet; Take 1 tablet by mouth Every 6 (Six) Hours As Needed for Moderate Pain or Severe Pain.  Dispense: 15 tablet; Refill: 0    2. Lumbar radiculitis right greater than left  -     gabapentin (NEURONTIN) 100 MG capsule; Take 2 capsules by mouth 2 (Two) Times a Day.  Dispense: 120 capsule; Refill: 2  -     Ambulatory Referral to Pain Management  -     HYDROcodone-acetaminophen (NORCO) 7.5-325 MG per tablet; Take 1 tablet by mouth Every 6 (Six) Hours As Needed for Moderate Pain or Severe Pain.  Dispense: 15 tablet; Refill: 0    3. Lumbar pain  -     Ambulatory Referral to Pain Management  -     HYDROcodone-acetaminophen (NORCO) 7.5-325 MG per tablet; Take 1 tablet by mouth Every 6 (Six) Hours As Needed for Moderate Pain or Severe Pain.  Dispense: 15 tablet; Refill: 0    Other orders  -     tiZANidine (ZANAFLEX) 4 MG tablet; Take 1 tablet by mouth Every 8 (Eight) Hours As Needed for Muscle Spasms.   Dispense: 30 tablet; Refill: 0      Assessment & Plan      Return in about 1 month (around 3/12/2025) for Dr. Terrazas must see patient at next appointment in 4 weeks.

## 2025-02-12 ENCOUNTER — OFFICE VISIT (OUTPATIENT)
Dept: NEUROSURGERY | Facility: CLINIC | Age: 75
End: 2025-02-12
Payer: MEDICARE

## 2025-02-12 VITALS
SYSTOLIC BLOOD PRESSURE: 122 MMHG | BODY MASS INDEX: 28.55 KG/M2 | DIASTOLIC BLOOD PRESSURE: 68 MMHG | HEART RATE: 74 BPM | TEMPERATURE: 97.6 F | WEIGHT: 151 LBS | OXYGEN SATURATION: 96 %

## 2025-02-12 DIAGNOSIS — Z09 SURGICAL FOLLOWUP VISIT: Primary | ICD-10-CM

## 2025-02-12 DIAGNOSIS — M54.16 LUMBAR RADICULITIS: ICD-10-CM

## 2025-02-12 DIAGNOSIS — M54.50 LUMBAR PAIN: ICD-10-CM

## 2025-02-12 PROCEDURE — 1159F MED LIST DOCD IN RCRD: CPT | Performed by: NURSE PRACTITIONER

## 2025-02-12 PROCEDURE — 99024 POSTOP FOLLOW-UP VISIT: CPT | Performed by: NURSE PRACTITIONER

## 2025-02-12 PROCEDURE — 1160F RVW MEDS BY RX/DR IN RCRD: CPT | Performed by: NURSE PRACTITIONER

## 2025-02-12 RX ORDER — GABAPENTIN 100 MG/1
200 CAPSULE ORAL 2 TIMES DAILY
Qty: 120 CAPSULE | Refills: 2 | Status: SHIPPED | OUTPATIENT
Start: 2025-02-12

## 2025-02-12 RX ORDER — HYDROCODONE BITARTRATE AND ACETAMINOPHEN 7.5; 325 MG/1; MG/1
1 TABLET ORAL EVERY 6 HOURS PRN
Qty: 15 TABLET | Refills: 0 | Status: SHIPPED | OUTPATIENT
Start: 2025-02-12

## 2025-02-17 ENCOUNTER — HOSPITAL ENCOUNTER (OUTPATIENT)
Dept: PHYSICAL THERAPY | Facility: HOSPITAL | Age: 75
Setting detail: THERAPIES SERIES
Discharge: HOME | End: 2025-02-17
Payer: MEDICARE

## 2025-02-17 DIAGNOSIS — M54.41 CHRONIC BILATERAL LOW BACK PAIN WITH BILATERAL SCIATICA: ICD-10-CM

## 2025-02-17 DIAGNOSIS — G89.29 CHRONIC BILATERAL LOW BACK PAIN WITH BILATERAL SCIATICA: ICD-10-CM

## 2025-02-17 DIAGNOSIS — Z98.890 STATUS POST LUMBAR SPINE SURGERY FOR DECOMPRESSION OF SPINAL CORD: Primary | ICD-10-CM

## 2025-02-17 DIAGNOSIS — Z47.89 ORTHOPEDIC AFTERCARE: ICD-10-CM

## 2025-02-17 DIAGNOSIS — Z98.890 STATUS POST LUMBAR DISCECTOMY: ICD-10-CM

## 2025-02-17 DIAGNOSIS — M54.42 CHRONIC BILATERAL LOW BACK PAIN WITH BILATERAL SCIATICA: ICD-10-CM

## 2025-02-17 PROCEDURE — 97161 PT EVAL LOW COMPLEX 20 MIN: CPT

## 2025-02-17 PROCEDURE — 97110 THERAPEUTIC EXERCISES: CPT

## 2025-02-17 NOTE — THERAPY EVALUATION
Outpatient Physical Therapy Ortho Initial Evaluation  [facility]     Patient Name: Imani Shankar  : 1950  MRN: [id number]  Today's Date: 2025      Visit Date: 2025    Patient Active Problem List   Diagnosis    Meningioma    Other chronic pain    Left lumbar radiculopathy    Sacroiliac joint dysfunction of left side    Cholangiocarcinoma of biliary tract    Gastroesophageal reflux disease without esophagitis    Hypothyroidism    Mixed hyperlipidemia    TIA (transient ischemic attack)    Small lymphocytic lymphoma    Chronic night sweats    Back pain    Lumbar radiculopathy    Bilateral leg weakness    Spinal stenosis of lumbar region with neurogenic claudication    Herniation of lumbar intervertebral disc with radiculopathy    Degeneration of intervertebral disc of lumbar region with discogenic back pain and lower extremity pain    Calf muscle weakness    Spinal stenosis of lumbar region        Past Medical History:   Diagnosis Date    Arthritis     Bleeding ulcer 2018    Cancer 2012    gallbladder - removal w/adjunct chemo and XRT (Dr. Kraft, Dr. Lanier)    Chronic low back pain     Disease of thyroid gland     Duodenal ulcer     GERD (gastroesophageal reflux disease)     GI (gastrointestinal bleed) 2018    High cholesterol     History of chemotherapy     History of radiation therapy     History of transfusion     Hypertension     Leg weakness, bilateral     Peripheral neuropathy     Small lymphocytic lymphoma     Spinal stenosis     LUMBAR    TIA (transient ischemic attack)         Past Surgical History:   Procedure Laterality Date    APPENDECTOMY      CATARACT EXTRACTION      CHOLECYSTECTOMY  2012    for gall bladder cancer, removed by Dr. Kendy Martinez    COLONOSCOPY  approx     Too YUEN,  normal per patient    COLONOSCOPY N/A 2022    Procedure: COLONOSCOPY to cecum and TI:  bx polyps, cold snare polyp;  Surgeon: Jante Hamilton MD;  Location: [facility]  ENDOSCOPY;  Service: Gastroenterology;  Laterality: N/A;  pre:  screening  post:  polyps, divertidulosis, hemorrhoids    ENDOSCOPY N/A 4/4/2019    Procedure: ESOPHAGOGASTRODUODENOSCOPY with biopsy;  Surgeon: Ruby Ferrari MD;  Location: [facility] ENDOSCOPY;  Service: Gastroenterology    LUMBAR LAMINECTOMY DISCECTOMY DECOMPRESSION N/A 12/17/2024    Procedure: Open bilateral lumbar decompression/discectomy lumbar four/five, lumbar five/sacral one;  Surgeon: Franklyn Terrazas MD;  Location: [facility] MAIN OR;  Service: Neurosurgery;  Laterality: N/A;    SHOULDER ARTHROSCOPY W/ ROTATOR CUFF REPAIR Right 9/28/2017    Procedure: RT SHOULDER ARTHROSCOPY ACROMIOPLASTY ROTATOR CUFF REPAIR AND LABRAL DEBRIDEMENT;  Surgeon: Nicho Hill MD;  [place] [facility] [place] [facility] [place]:     UPPER GASTROINTESTINAL ENDOSCOPY  02/02/2018    [facility]    multipal duodenal ulcers in D1-D2 area, no active bleed    UPPER GASTROINTESTINAL ENDOSCOPY  01/31/2018    [facility]   oozing blood of small ulcer in D1-D2 region, clot overlying ulcer, injected at base, endo clips placed    UPPER GASTROINTESTINAL ENDOSCOPY  03/14/2018    [facility]   previous ulcer almost healed    US GUIDED LYMPH NODE BIOPSY  5/2/2024       Visit Dx:     ICD-10-CM ICD-9-CM   1. Status post lumbar spine surgery for decompression of spinal cord  Z98.890 V45.89   2. Status post lumbar discectomy  Z98.890 V45.89   3. Orthopedic aftercare  Z47.89 V54.9   4. Chronic bilateral low back pain with bilateral sciatica  M54.42 724.2    M54.41 724.3    G89.29 338.29          Patient History       Row Name 02/17/25 1400 02/15/25 1141          History    Chief Complaint Pain;Numbness;Difficulty Walking  -DR Difficulty Walking;Difficulty with daily activities;Fatigue/poor endurance;Numbness;Pain (P)    -patient     Type of Pain Back pain;Hip pain  -DR Back pain;Other pain (P)    -patient     Other Type of Pain -- Hip area (P)    -patient     Date  Current Problem(s) Began 10/24/24  - 10/24/24 (P)    -patient     Brief Description of Current Complaint Imani Shankar is a 74 y.o. female who presents to physical therapy today s/p L4-5 5-1 decompression/discectomy 12/17/24, due to spinal stenosis, radiculopathy, and leg weakness. Attempted steroid pack and epidural block for conservative treatment, ultimately failing which lead to surgery. Current symptoms are severe low back pain radiating into both hips, minimal problems into legs, continues to have numbness into R medial calf only. She is concurrently being referred to pain management for transforaminal injection to L2-3 after MRI showing moderate canal stenosis at L3-L4, loss of disc height and retrolisthesis of L2 upon L3, post-surgery. Imani Shankar reports difficulty/increased pain with bending over to pick things up, housework, walking 5-10 minutes, standing for 10-15 minutes. Pain relieving factors include not bending, resting, sitting, muscle relaxers. PMH includes meningioma, hypothyroidism, HLD, TIA, lymphoma, chronic LBP, HTN. Imani Shankar primary goal for attending skilled physical therapy is to relieve back pain.  - Lower back pain and hip pain (P)    -patient     Previous treatment for THIS PROBLEM Injections;Medication;Surgery  - --     Surgery Date: 12/17/24  - --     Patient/Caregiver Goals Relieve pain;Return to prior level of function;Improve mobility;Improve strength;Know what to do to help the symptoms  - Relieve pain;Return to prior level of function;Improve mobility;Improve strength;Know what to do to help the symptoms (P)    -patient     Hand Dominance right-handed  - right-handed (P)    -patient     Occupation/sports/leisure activities Walking  - Just walking (P)    -patient     Patient seeing anyone else for problem(s)? -- No (P)    -patient     What clinical tests have you had for this problem? X-ray;MRI  - X-ray;MRI (P)    -patient     Results of Clinical Tests MRI prior to  surgery showing severe  spinal stenosis at L4-L5 and L5-S1.  -DR --     Are you or can you be pregnant -- No (P)    -patient        Pain     Pain Location Back;Leg  -DR --     Pain at Present 3  -DR --     Pain at Best 0  -DR --     Pain at Worst 8  -DR --     Pain Frequency Intermittent;Several days a week  -DR --     Pain Description Sharp;Shooting;Aching  -DR --     What Performance Factors Make the Current Problem(s) WORSE? bending over to pick things up, housework, walking 5-10 minutes, standing for 10-15 minutes  -DR --     What Performance Factors Make the Current Problem(s) BETTER? not bending, resting, sitting, muscle relaxers  -DR --     Tolerance Time- Standing 10-15 min  -DR --     Tolerance Time- Walking 10 min  -DR --     Is your sleep disturbed? Yes  -DR --     Difficulties with ADL's? no  -DR --        Fall Risk Assessment    Any falls in the past year: Yes  -DR Yes (P)    -patient     Number of falls reported in the last 12 months 1  -DR --     Factors that contributed to the fall: Tripped  -DR Tripped (P)    -patient        Services    Prior Rehab/Home Health Experiences No  -DR No (P)    -patient     Are you currently receiving Home Health services -- No (P)    -patient        Daily Activities    Primary Language English  -DR Other (comment) (P)    -patient     Primary Language Comment -- Wilfredo and smita (P)    -patient     Are you able to read -- Yes (P)    -patient     Are you able to write -- Yes (P)    -patient     How does patient learn best? Demonstration;Pictures/Video;Listening;Reading  -DR Listening;Reading;Demonstration (P)    -patient     Barriers to learning None  -DR --     Pt Participated in POC and Goals Yes  -DR --        Safety    Are you being hurt, hit, or frightened by anyone at home or in your life? No  -DR No (P)    -patient     Are you being neglected by a caregiver No  -DR No (P)    -patient     Have you had any of the following issues with N/A  -DR Anxiety (P)     -patient               User Key  (r) = Recorded By, (t) = Taken By, (c) = Cosigned By      Initials Name Provider Type    Rufino Coley, PT Physical Therapist    patient Imani Shankar --                     PT Ortho       Row Name 02/17/25 1400       Posture/Observations    Alignment Options Lumbar lordosis  -DR    Lumbar lordosis Mild;Decreased  -DR       Quarter Clearing    Quarter Clearing Lower Quarter Clearing  -DR       Neural Tension Signs- Lower Quarter Clearing    Slump Bilateral:;Negative  -DR    SLR Bilateral:;Negative  -DR       Sensory Screen for Light Touch- Lower Quarter Clearing    L1 (inguinal area) Bilateral:;Intact  -DR    L2 (anterior mid thigh) Bilateral:;Intact  -DR    L3 (distal anterior thigh) Bilateral:;Intact  -DR    L4 (medial lower leg/foot) Right:;Diminished  -DR    L5 (lateral lower leg/great toe) Bilateral:;Intact  -DR    S1 (bottom of foot) Right:;Diminished  -DR       Myotomal Screen- Lower Quarter Clearing    Hip flexion (L2) Left:;4 (Good);Right:;4- (Good -)  -DR    Knee extension (L3) Bilateral:;4 (Good)  -DR    Ankle DF (L4) Bilateral:;4+ (Good +)  -DR    Knee flexion (S2) Left:;5 (Normal);Right:;4 (Good)  -DR       Lumbar ROM Screen- Lower Quarter Clearing    Lumbar Flexion Impaired  75% WNL mild pain  -DR    Lumbar Extension Impaired  <25% WNL  -DR    Lumbar Lateral Flexion Impaired  <25% WNL, pain going to R  -DR    Lumbar Rotation Impaired  50% WNL  -DR       SI/Hip Screen- Lower Quarter Clearing    Pinky's/Sadiq's test Bilateral:;Positive  mild  -DR       Special Tests/Palpation    Special Tests/Palpation Lumbar/SI  -DR       Lumbosacral Palpation    Lumbosacral Palpation? Yes  -    SI Left:;Tender  -DR    Lumbosacral Segment Bilateral:;Tender  -DR    Spinous Process Tender  very TTP lower lumbar, mildly tender over upper lumbar  -DR    Piriformis Bilateral:;Guarded/taut;Left:;Tender  -DR    Erector Spinae (Paraspinals) Bilateral:;Guarded/taut  -DR       MMT (Manual  Muscle Testing)    Rt Lower Ext Rt Hip ABduction;Rt Hip ADduction  -DR    Lt Lower Ext Lt Hip ABduction;Lt Hip ADduction  -DR       MMT Right Lower Ext    Rt Hip ABduction MMT, Gross Movement (3+/5) fair plus  -DR    Rt Hip ADduction MMT, Gross Movement (4/5) good  -DR       MMT Left Lower Ext    Lt Hip ABduction MMT, Gross Movement (4-/5) good minus  -DR    Lt Hip ADduction MMT, Gross Movement (4/5) good  -DR       Sensation    Light Touch Partial deficits in the RLE  -DR       Flexibility    Flexibility Tested? Lower Extremity  -DR       Lower Extremity Flexibility    Hamstrings Bilateral:;Moderately limited  -DR    Hip External Rotators Bilateral:;Mildly limited  -DR    Hip Internal Rotators Bilateral:;Mildly limited  -DR              User Key  (r) = Recorded By, (t) = Taken By, (c) = Cosigned By      Initials Name Provider Type    Rufino Coley, PT Physical Therapist                                Therapy Education  Education Details: Educated on PT role and POC; discussed expectations/timeframe for healing. Provided HEP, Access Code: [email or web address]  Given: HEP, Symptoms/condition management  Program: New  How Provided: Verbal, Demonstration, Written  Provided to: Patient  Level of Understanding: Teach back education performed, Verbalized, Demonstrated      PT OP Goals       Row Name 02/17/25 1400          PT Short Term Goals    STG Date to Achieve 03/19/25  -     STG 1 Pt. will be independent with initial HEP to improve self-management of condition.  -     STG 1 Progress New  -     STG 2 Patient will demonstrate proper log roll mechanics with little to no cues when getting on/off treatment table to demonstrate improved mechanics and decreased strain on lumbar spine.  -     STG 2 Progress New  -     STG 3 Patient will improve walking tolerance from 10 min to >30 min without LBP to improve participation in household activities.  -     STG 3 Progress New  -     STG 4 --  -     STG 4 Progress --   -     STG 5 --  -     STG 5 Progress --  -DR        Long Term Goals    LTG Date to Achieve 04/18/25  -     LTG 1 Pt. will be independent with advanced HEP to improve long term independence with self management of condition.  -     LTG 1 Progress New  DELISA     LTG 2 Pt. will score </= 15/50 on Modified Oswestry to indicate improved perception of disability.  -     LTG 2 Progress New  -     LTG 3 Pt. will demonstrate proper TrA engagement in standing and dynamic movements to improve lumbopelvic stability.  -     LTG 3 Progress New  -     LTG 4 Patient will improve ability to sleep through the night without sleep disturbances related to back pain to improve overall sleep quality and quality of life.  -     LTG 4 Progress New  -     LTG 5 Pt will test 4+/5 MMT for BLE.  -     LTG 5 Progress New  -DR        Time Calculation    PT Goal Re-Cert Due Date 05/18/25  -               User Key  (r) = Recorded By, (t) = Taken By, (c) = Cosigned By      Initials Name Provider Type    Rufino Coley, PT Physical Therapist                     PT Assessment/Plan       Row Name 02/17/25 1400          PT Assessment    Functional Limitations Impaired locomotion;Limitations in community activities;Performance in leisure activities;Impaired gait;Decreased safety during functional activities;Limitation in home management;Limitations in functional capacity and performance  -DR     Impairments Impaired flexibility;Posture;Poor body mechanics;Pain;Muscle strength;Locomotion;Range of motion;Gait;Joint mobility;Impaired muscle endurance  -DR     Assessment Comments Imani Sahnkar is a 74 y.o. year-old female referred to physical therapy for s/p L4-5 5-1 decompression/discectomy 12/17/24. Current symptoms are severe low back pain radiating into both hips, minimal problems into legs, continues to have numbness into R medial calf only. She is concurrently being referred to pain management for transforaminal injection to  L2-3 after MRI showing moderate canal stenosis at L3-L4, loss of disc height and retrolisthesis of L2 upon L3, post-surgery. She presents with a stable clinical presentation. Pertinent comorbidities and personal factors that may affect progress in the plan of care include, but are not limited to, meningioma, hypothyroidism, HLD, TIA, lymphoma, chronic LBP, HTN, imaging, chronicity of symptoms with learned compensatory habits, active lifestyle. Self scored disability measure of FELIPA was a 28/50, where 0 is no perceived disability. She demonstrated impaired postural alignment, negative neural tension tests bilaterally, partial sensation deficits down RLE, impaired lumbar AROM in all cardinal planes, impaired hip girdle and B LE strength (R>L), TTP lumbosacral segments, and impaired B LE flexibility. Signs and symptoms are consistent with referring diagnosis. She is appropriate for skilled therapy services at this time to address deficits and improve ease with household duties.  -     Please refer to paper survey for additional self-reported information No  -DR     Rehab Potential Good  -DR     Patient/caregiver participated in establishment of treatment plan and goals Yes  -DR     Patient would benefit from skilled therapy intervention Yes  -DR        PT Plan    PT Frequency 2x/week  -     Predicted Duration of Therapy Intervention (PT) 8-10 visits  -     Planned CPT's? PT EVAL LOW COMPLEXITY: 06072;PT RE-EVAL: 12562;PT THER PROC EA 15 MIN: 39042;PT THER ACT EA 15 MIN: 77088;PT MANUAL THERAPY EA 15 MIN: 27503;PT NEUROMUSC RE-EDUCATION EA 15 MIN: 36327;PT GAIT TRAINING EA 15 MIN: 18604;PT SELF CARE/HOME MGMT/TRAIN EA 15: 18782;PT HOT OR COLD PACK TREAT MCARE;PT ELECTRICAL STIM UNATTEND: ;PT ELECTRICAL STIM ATTD EA 15 MIN: 53979;PT ULTRASOUND EA 15 MIN: 60635;PT TRACTION LUMBAR: 77533  -DR     PT Plan Comments print schedule; did pt hear from pain management for date for injection? review HEP. nustep.  consider log roll technique, PPT, PPT with marches, h/l hip abd/add vs BKFO, could attempt seated hip flex str?  -DR               User Key  (r) = Recorded By, (t) = Taken By, (c) = Cosigned By      Initials Name Provider Type    Rufino Coley, PT Physical Therapist                       OP Exercises       Row Name 02/17/25 1400             Subjective    Subjective Comments eval  -DR         Total Minutes    82558 - PT Therapeutic Exercise Minutes 8  -DR         Exercise 1    Exercise Name 1 nustep  -DR      Additional Comments next visit  -DR         Exercise 2    Exercise Name 2 LTR  -DR      Cueing 2 Demo  -DR      Sets 2 2  -DR      Reps 2 10  -DR      Time 2 5s  -DR         Exercise 3    Exercise Name 3 supine piriformis str  -DR      Cueing 3 Demo  -DR      Reps 3 3e  -DR      Time 3 20s  -DR         Exercise 4    Exercise Name 4 seated HS str  -DR      Cueing 4 Demo  -DR      Reps 4 3e  -DR      Time 4 20s  -DR                User Key  (r) = Recorded By, (t) = Taken By, (c) = Cosigned By      Initials Name Provider Type    Rufino oCley, PT Physical Therapist                                  Outcome Measure Options: Modified Oswestry  Modified Oswestry  Modified Oswestry Score/Comments: 28/50= 56%      Time Calculation:     Start Time: 1425  Stop Time: 1505  Time Calculation (min): 40 min  Timed Charges  18386 - PT Therapeutic Exercise Minutes: 8  Total Minutes  Timed Charges Total Minutes: 8   Total Minutes: 8     Therapy Charges for Today       Code Description Service Date Service Provider Modifiers Qty    85290913550 HC PT THER PROC EA 15 MIN 2/17/2025 Rufino Alejandra, PT GP 1    15540002251 HC PT EVAL LOW COMPLEXITY 2 2/17/2025 Ruifno Alejandra, PT GP 1            PT G-Codes  Outcome Measure Options: Modified Oswestry  Modified Oswestry Score/Comments: 28/50= 56%         Rufino Alejandra, [place]  2/17/2025

## 2025-02-20 ENCOUNTER — APPOINTMENT (OUTPATIENT)
Dept: PHYSICAL THERAPY | Facility: HOSPITAL | Age: 75
End: 2025-02-20
Payer: MEDICARE

## 2025-02-25 ENCOUNTER — HOSPITAL ENCOUNTER (OUTPATIENT)
Dept: PHYSICAL THERAPY | Facility: HOSPITAL | Age: 75
Setting detail: THERAPIES SERIES
Discharge: HOME | End: 2025-02-25
Payer: MEDICARE

## 2025-02-25 ENCOUNTER — TELEPHONE (OUTPATIENT)
Dept: NEUROSURGERY | Facility: CLINIC | Age: 75
End: 2025-02-25
Payer: MEDICARE

## 2025-02-25 DIAGNOSIS — M54.41 CHRONIC BILATERAL LOW BACK PAIN WITH BILATERAL SCIATICA: ICD-10-CM

## 2025-02-25 DIAGNOSIS — M54.16 LUMBAR RADICULITIS: ICD-10-CM

## 2025-02-25 DIAGNOSIS — Z47.89 ORTHOPEDIC AFTERCARE: ICD-10-CM

## 2025-02-25 DIAGNOSIS — G89.29 CHRONIC BILATERAL LOW BACK PAIN WITH BILATERAL SCIATICA: ICD-10-CM

## 2025-02-25 DIAGNOSIS — Z98.890 STATUS POST LUMBAR SPINE SURGERY FOR DECOMPRESSION OF SPINAL CORD: Primary | ICD-10-CM

## 2025-02-25 DIAGNOSIS — Z98.890 STATUS POST LUMBAR DISCECTOMY: ICD-10-CM

## 2025-02-25 DIAGNOSIS — M54.42 CHRONIC BILATERAL LOW BACK PAIN WITH BILATERAL SCIATICA: ICD-10-CM

## 2025-02-25 DIAGNOSIS — Z09 SURGICAL FOLLOWUP VISIT: ICD-10-CM

## 2025-02-25 PROCEDURE — 97110 THERAPEUTIC EXERCISES: CPT

## 2025-02-25 NOTE — THERAPY TREATMENT NOTE
Outpatient Physical Therapy Ortho Treatment Note  [facility]     Patient Name: Imani Shankar  : 1950  MRN: [id number]  Today's Date: 2025      Visit Date: 2025    Visit Dx:    ICD-10-CM ICD-9-CM   1. Status post lumbar spine surgery for decompression of spinal cord  Z98.890 V45.89   2. Status post lumbar discectomy  Z98.890 V45.89   3. Orthopedic aftercare  Z47.89 V54.9   4. Chronic bilateral low back pain with bilateral sciatica  M54.42 724.2    M54.41 724.3    G89.29 338.29       Patient Active Problem List   Diagnosis    Meningioma    Other chronic pain    Left lumbar radiculopathy    Sacroiliac joint dysfunction of left side    Cholangiocarcinoma of biliary tract    Gastroesophageal reflux disease without esophagitis    Hypothyroidism    Mixed hyperlipidemia    TIA (transient ischemic attack)    Small lymphocytic lymphoma    Chronic night sweats    Back pain    Lumbar radiculopathy    Bilateral leg weakness    Spinal stenosis of lumbar region with neurogenic claudication    Herniation of lumbar intervertebral disc with radiculopathy    Degeneration of intervertebral disc of lumbar region with discogenic back pain and lower extremity pain    Calf muscle weakness    Spinal stenosis of lumbar region        Past Medical History:   Diagnosis Date    Arthritis     Bleeding ulcer 2018    Cancer 2012    gallbladder - removal w/adjunct chemo and XRT (Dr. Kraft, Dr. Lanier)    Chronic low back pain     Disease of thyroid gland     Duodenal ulcer     GERD (gastroesophageal reflux disease)     GI (gastrointestinal bleed) 2018    High cholesterol     History of chemotherapy     History of radiation therapy     History of transfusion     Hypertension     Leg weakness, bilateral     Peripheral neuropathy     Small lymphocytic lymphoma     Spinal stenosis     LUMBAR    TIA (transient ischemic attack)         Past Surgical History:   Procedure Laterality Date    APPENDECTOMY      CATARACT  EXTRACTION      CHOLECYSTECTOMY  01/2012    for gall bladder cancer, removed by Dr. Kendy Martinez    COLONOSCOPY  approx 2013    Too YUEN,  normal per patient    COLONOSCOPY N/A 11/29/2022    Procedure: COLONOSCOPY to cecum and TI:  bx polyps, cold snare polyp;  Surgeon: Janet Hamilton MD;  [facility];  Service: Gastroenterology;  Laterality: N/A;  pre:  screening  post:  polyps, divertidulosis, hemorrhoids    ENDOSCOPY N/A 4/4/2019    Procedure: ESOPHAGOGASTRODUODENOSCOPY with biopsy;  Surgeon: Ruby Ferrari MD;  [facility];  Service: Gastroenterology    LUMBAR LAMINECTOMY DISCECTOMY DECOMPRESSION N/A 12/17/2024    Procedure: Open bilateral lumbar decompression/discectomy lumbar four/five, lumbar five/sacral one;  Surgeon: Franklyn Terrazas MD;  Location: [facility] MAIN OR;  Service: Neurosurgery;  Laterality: N/A;    SHOULDER ARTHROSCOPY W/ ROTATOR CUFF REPAIR Right 9/28/2017    Procedure: RT SHOULDER ARTHROSCOPY ACROMIOPLASTY ROTATOR CUFF REPAIR AND LABRAL DEBRIDEMENT;  Surgeon: Nicho Hill MD;  Location:  [facility];  Service:     UPPER GASTROINTESTINAL ENDOSCOPY  02/02/2018    [facility]    multipal duodenal ulcers in D1-D2 area, no active bleed    UPPER GASTROINTESTINAL ENDOSCOPY  01/31/2018    [facility]   oozing blood of small ulcer in D1-D2 region, clot overlying ulcer, injected at base, endo clips placed    UPPER GASTROINTESTINAL ENDOSCOPY  03/14/2018    [facility]   previous ulcer almost healed    US GUIDED LYMPH NODE BIOPSY  5/2/2024                        PT Assessment/Plan       Row Name 02/25/25 1300          PT Assessment    Assessment Comments Imani Shankar returns to PT for her first tx session s/p L4-5 5-1 decompression/discectomy performed on 12/17/24. Pt reporting continued elevated pain that has not improved since her surgery. Pt has reached out to pain management and is awaiting to hear back at this time but she  "is expecting them to call very soon. After NuStep warmup pt educated on proper performance of log roll as she has been moving into supine from seated w/ excessive lumbar rotational movements. After initial cueing and demonstration pt able to perform on her own, will need to assess again on her next visit to check for carry-over. Able to initiate general core strengthening and stability, pt requires cues for improved TvA activation as she shows some fatigue w/ her last couple reps for each set. Able to perform 1 set of bridges but unable to tolerate any more due to increased LBP.  -CS        PT Plan    PT Plan Comments Potentially update HEP if session goes well, clams, side steps,  -CS               User Key  (r) = Recorded By, (t) = Taken By, (c) = Cosigned By      Initials Name Provider Type    CS Vinicio Guzman, PT Physical Therapist                       OP Exercises       Row Name 02/25/25 1300             Subjective    Subjective Comments \"Bad, it's so bad\"  -CS         Subjective Pain    Able to rate subjective pain? yes  -CS      Pre-Treatment Pain Level 6  -CS      Post-Treatment Pain Level 5  -CS         Total Minutes    61059 - PT Therapeutic Exercise Minutes 36  -CS      74992 - PT Therapeutic Activity Minutes 5  -CS         Exercise 1    Exercise Name 1 nustep  -CS      Time 1 5 min  -CS      Additional Comments Lvl 3  -CS         Exercise 2    Exercise Name 2 LTR  -CS      Cueing 2 Verbal  -CS      Sets 2 2  -CS      Reps 2 10  -CS      Time 2 5s  -CS         Exercise 3    Exercise Name 3 supine piriformis str  -CS      Cueing 3 Verbal;Tactile  -CS      Reps 3 3e  -CS      Time 3 20s  -CS         Exercise 4    Exercise Name 4 seated HS str  -CS      Cueing 4 Demo  -CS      Reps 4 3e  -CS      Time 4 20s  -CS         Exercise 5    Exercise Name 5 PPT  -CS      Cueing 5 Verbal;Tactile  -CS      Sets 5 2  -CS      Reps 5 10  -CS      Time 5 5s  -CS         Exercise 6    Exercise Name 6 PPT + march  -CS      " Cueing 6 Verbal;Demo  -CS      Sets 6 2  -CS      Reps 6 10  -CS      Time 6 3s  -CS         Exercise 7    Exercise Name 7 PPT + bridge  -CS      Cueing 7 Verbal  -CS      Sets 7 1  -CS      Reps 7 10  -CS      Additional Comments small range  -CS         Exercise 8    Exercise Name 8 HL hip add  -CS      Cueing 8 Verbal  -CS      Sets 8 2  -CS      Reps 8 12  -CS      Time 8 3s  -CS      Additional Comments green ball  -CS         Exercise 9    Exercise Name 9 Log roll technique  -CS      Time 9 5 mins  -CS         Exercise 10    Exercise Name 10 HL hip abduction  -CS      Cueing 10 Verbal  -CS      Sets 10 1  -CS      Reps 10 10  -CS      Time 10 RTB  -CS      Additional Comments Too easy  -CS         Exercise 11    Exercise Name 11 BKFO  -CS      Cueing 11 Verbal  -CS      Sets 11 2  -CS      Reps 11 10  -CS      Additional Comments alt LEs  -CS                User Key  (r) = Recorded By, (t) = Taken By, (c) = Cosigned By      Initials Name Provider Type    CS Vinicio Guzman, PT Physical Therapist                                  PT OP Goals       Row Name 02/25/25 1300          PT Short Term Goals    STG Date to Achieve 03/19/25  -CS     STG 1 Pt. will be independent with initial HEP to improve self-management of condition.  -CS     STG 1 Progress Ongoing  -CS     STG 2 Patient will demonstrate proper log roll mechanics with little to no cues when getting on/off treatment table to demonstrate improved mechanics and decreased strain on lumbar spine.  -CS     STG 2 Progress Ongoing  -CS     STG 3 Patient will improve walking tolerance from 10 min to >30 min without LBP to improve participation in household activities.  -CS     STG 3 Progress Ongoing  -CS        Long Term Goals    LTG Date to Achieve 04/18/25  -CS     LTG 1 Pt. will be independent with advanced HEP to improve long term independence with self management of condition.  -CS     LTG 1 Progress Ongoing  -CS     LTG 2 Pt. will score </= 15/50 on Modified  Oswestry to indicate improved perception of disability.  -CS     LTG 2 Progress Ongoing  -CS     LTG 3 Pt. will demonstrate proper TrA engagement in standing and dynamic movements to improve lumbopelvic stability.  -CS     LTG 3 Progress Ongoing  -CS     LTG 4 Patient will improve ability to sleep through the night without sleep disturbances related to back pain to improve overall sleep quality and quality of life.  -CS     LTG 4 Progress Ongoing  -CS     LTG 5 Pt will test 4+/5 MMT for BLE.  -CS     LTG 5 Progress Ongoing  -CS               User Key  (r) = Recorded By, (t) = Taken By, (c) = Cosigned By      Initials Name Provider Type    CS Vinicio Guzman, PT Physical Therapist                    Therapy Education  Education Details: Proper performance of log roll, spinal precautions and continue to be cautious and adhere to precautions due to elevated pain  Given: HEP, Symptoms/condition management  Program: Reinforced, Progressed  How Provided: Verbal, Demonstration  Provided to: Patient  Level of Understanding: Teach back education performed, Verbalized, Demonstrated              Time Calculation:   Start Time: 1353  Stop Time: 1434  Time Calculation (min): 41 min  Timed Charges  58521 - PT Therapeutic Exercise Minutes: 36  00465 - PT Therapeutic Activity Minutes: 5  Total Minutes  Timed Charges Total Minutes: 41   Total Minutes: 41  Therapy Charges for Today       Code Description Service Date Service Provider Modifiers Qty    98449914709 HC PT THER PROC EA 15 MIN 2/25/2025 Vinicio Guzman, PT GP 3                      Vinicio Guzman PT  2/25/2025

## 2025-02-25 NOTE — TELEPHONE ENCOUNTER
Caller: PATIENT    Relationship: SELF    Best call back number: [phone]    What is the best time to reach you: ANYTIME    Who are you requesting to speak with (clinical staff, provider,  specific staff member): CLINICAL     Do you know the name of the person who called:     What was the call regarding: PATIENT CALLED AND STATES THAT SHE IS OUT OF HER GABAPENTIN-PATIENT STATES THAT SHE WAS TO TAKE 200 MG TWICE A DAY-PATIENT STATES THAT PHARMACY HAS HER  MG TWICE A DAY STILL-SHE IS UNABLE TO FILL HER MEDICATION UNTIL IT IS CORRECTED-SENDING TO OFFICE TO ADVISE OF CALL FROM PATIENT THANK YOU    Is it okay if the provider responds through MyChart:

## 2025-02-27 ENCOUNTER — APPOINTMENT (OUTPATIENT)
Dept: PHYSICAL THERAPY | Facility: HOSPITAL | Age: 75
End: 2025-02-27
Payer: MEDICARE

## 2025-02-27 RX ORDER — GABAPENTIN 100 MG/1
200 CAPSULE ORAL 2 TIMES DAILY
Qty: 120 CAPSULE | Refills: 2 | Status: SHIPPED | OUTPATIENT
Start: 2025-02-27 | End: 2025-05-28

## 2025-02-27 NOTE — TELEPHONE ENCOUNTER
Please contact patient and let her know that I have sent a renewed prescription for the gabapentin to [facility] at [facility] [place] [facility]

## 2025-02-27 NOTE — TELEPHONE ENCOUNTER
Spoke to patient and she understood it was called in to [facility] at [facility].

## 2025-03-03 ENCOUNTER — PREP FOR SURGERY (OUTPATIENT)
Dept: SURGERY | Facility: SURGERY CENTER | Age: 75
End: 2025-03-03
Payer: MEDICARE

## 2025-03-03 ENCOUNTER — OFFICE VISIT (OUTPATIENT)
Dept: PAIN MEDICINE | Facility: CLINIC | Age: 75
End: 2025-03-03
Payer: MEDICARE

## 2025-03-03 VITALS
WEIGHT: 151.2 LBS | BODY MASS INDEX: 28.58 KG/M2 | OXYGEN SATURATION: 96 % | TEMPERATURE: 97.7 F | SYSTOLIC BLOOD PRESSURE: 151 MMHG | DIASTOLIC BLOOD PRESSURE: 88 MMHG | HEART RATE: 91 BPM

## 2025-03-03 DIAGNOSIS — M54.16 BILATERAL LUMBAR RADICULOPATHY: ICD-10-CM

## 2025-03-03 DIAGNOSIS — Z98.890 STATUS POST LUMBAR SPINE SURGERY FOR DECOMPRESSION OF SPINAL CORD: Primary | ICD-10-CM

## 2025-03-03 DIAGNOSIS — Z98.890 STATUS POST LUMBAR SPINE SURGERY FOR DECOMPRESSION OF SPINAL CORD: ICD-10-CM

## 2025-03-03 DIAGNOSIS — M54.16 SPINAL STENOSIS OF LUMBAR REGION WITH RADICULOPATHY: ICD-10-CM

## 2025-03-03 DIAGNOSIS — M54.16 LUMBAR RADICULOPATHY: Primary | ICD-10-CM

## 2025-03-03 DIAGNOSIS — M48.061 SPINAL STENOSIS OF LUMBAR REGION WITH RADICULOPATHY: ICD-10-CM

## 2025-03-03 PROCEDURE — 1160F RVW MEDS BY RX/DR IN RCRD: CPT | Performed by: ANESTHESIOLOGY

## 2025-03-03 PROCEDURE — 99204 OFFICE O/P NEW MOD 45 MIN: CPT | Performed by: ANESTHESIOLOGY

## 2025-03-03 PROCEDURE — 1125F AMNT PAIN NOTED PAIN PRSNT: CPT | Performed by: ANESTHESIOLOGY

## 2025-03-03 PROCEDURE — 1159F MED LIST DOCD IN RCRD: CPT | Performed by: ANESTHESIOLOGY

## 2025-03-03 NOTE — PROGRESS NOTES
CHIEF COMPLAINT  Patient was referred by ALICIA Yun for back pain. She describes her pain as aching and shooting that radiates into her right leg. She has some numbness in her right leg. She has had PT which made her pain worse. She had back surgery on 12/17/24. She had an epidural in 11/2024 which did not improve her pain at that time. She uses heat and ice therapy. She is currently on Tramadol, gabapentin and tizanidine which helps a little. She has tried lidocaine patches in the past. Walking, bending and sitting for long periods makes her pain worse. Laying down improves her pain some.     Subjective   Imani Shankar is a 74 y.o. female.   She presents to the office for evaluation of back pain. She was referred here by Elizabeth VARGAS at [facility] neurosurgery.  Her PCP is Dr. Chang Carter..     She has some radiating pain anteriorly and posteriorly in both legs, though worse radiating pain is more anterior in the thigh and the right leg.  She has had worsening of pain with physical therapy efforts.  There is some paresthetic symptoms with painful numbness in the right anterior leg also, this is approximating L3 dermatome.  The left has some similar symptoms but not as severe as the right.    Back Pain  This is a chronic problem. The current episode started more than 1 month ago. The problem has been worse (worsening upper lumbar area) since onset. The pain is present in the lumbar spine. The quality of the pain is described as aching, burning and shooting. The pain radiates to the right thigh, left buttock and right buttock. The pain is moderate. The symptoms are aggravated by bending, standing, twisting and sitting. Associated symptoms include numbness and weakness. Pertinent negatives include no abdominal pain, chest pain, dysuria, fever or headaches. Risk factors include menopause. Treatments tried: surgery.        PEG Assessment   What number best describes your pain on average in the past week?5 with  out medication and 3 with medication  What number best describes how, during the past week, pain has interfered with your enjoyment of life?5  What number best describes how, during the past week, pain has interfered with your general activity?  8    --  The aforementioned information the Chief Complaint section and above subjective data including any HPI data, and also the Review of Systems data, has been personally reviewed and affirmed.  --      Current Outpatient Medications:     acetaminophen (TYLENOL) 325 MG tablet, Take 2 tablets by mouth Every 4 (Four) Hours As Needed for Mild Pain., Disp: , Rfl:     aspirin 81 MG chewable tablet, Chew 1 tablet Daily., Disp: , Rfl:     gabapentin (NEURONTIN) 100 MG capsule, Take 2 capsules by mouth 2 (Two) Times a Day for 90 days., Disp: 120 capsule, Rfl: 2    levothyroxine (SYNTHROID, LEVOTHROID) 100 MCG tablet, Take 1 tablet by mouth Daily., Disp: 90 tablet, Rfl: 0    loratadine (CLARITIN) 10 MG tablet, Take 1 tablet by mouth Daily As Needed., Disp: , Rfl:     metoprolol succinate XL (TOPROL-XL) 50 MG 24 hr tablet, Take 1 tablet by mouth Daily., Disp: , Rfl:     multivitamin with minerals (MULTIVITAMIN ADULT PO), Take 1 tablet by mouth Daily. HOLDING FOR DOS, Disp: , Rfl:     omeprazole (priLOSEC) 40 MG capsule, Take 1 capsule by mouth Daily., Disp: , Rfl:     pravastatin (PRAVACHOL) 40 MG tablet, TAKE 1 TABLET BY MOUTH DAILY (Patient taking differently: Take 1 tablet by mouth Every Night.), Disp: 90 tablet, Rfl: 3    tiZANidine (ZANAFLEX) 4 MG tablet, Take 1 tablet by mouth Every 8 (Eight) Hours As Needed for Muscle Spasms., Disp: 30 tablet, Rfl: 0    traMADol (ULTRAM) 50 MG tablet, Take 1 tablet by mouth Every 6 (Six) Hours., Disp: , Rfl:     The following portions of the patient's history were reviewed and updated as appropriate: allergies, current medications, past family history, past medical history, past social history, past surgical history, and problem  list.    -------    The following portions of the patient's history were reviewed and updated as appropriate: allergies, current medications, past family history, past medical history, past social history, past surgical history and problem list.    No Known Allergies    Current Outpatient Medications on File Prior to Visit   Medication Sig Dispense Refill    acetaminophen (TYLENOL) 325 MG tablet Take 2 tablets by mouth Every 4 (Four) Hours As Needed for Mild Pain.      aspirin 81 MG chewable tablet Chew 1 tablet Daily.      gabapentin (NEURONTIN) 100 MG capsule Take 2 capsules by mouth 2 (Two) Times a Day for 90 days. 120 capsule 2    levothyroxine (SYNTHROID, LEVOTHROID) 100 MCG tablet Take 1 tablet by mouth Daily. 90 tablet 0    loratadine (CLARITIN) 10 MG tablet Take 1 tablet by mouth Daily As Needed.      metoprolol succinate XL (TOPROL-XL) 50 MG 24 hr tablet Take 1 tablet by mouth Daily.      multivitamin with minerals (MULTIVITAMIN ADULT PO) Take 1 tablet by mouth Daily. HOLDING FOR DOS      omeprazole (priLOSEC) 40 MG capsule Take 1 capsule by mouth Daily.      pravastatin (PRAVACHOL) 40 MG tablet TAKE 1 TABLET BY MOUTH DAILY (Patient taking differently: Take 1 tablet by mouth Every Night.) 90 tablet 3    tiZANidine (ZANAFLEX) 4 MG tablet Take 1 tablet by mouth Every 8 (Eight) Hours As Needed for Muscle Spasms. 30 tablet 0    traMADol (ULTRAM) 50 MG tablet Take 1 tablet by mouth Every 6 (Six) Hours.       No current facility-administered medications on file prior to visit.         * No active hospital problems. *       Past Medical History:   Diagnosis Date    Arthritis     Bleeding ulcer 2018    Cancer 01/2012    gallbladder - removal w/adjunct chemo and XRT (Dr. Kraft, Dr. Lanier)    Chronic low back pain     Disease of thyroid gland     Duodenal ulcer     Extremity pain Left leg pain    GERD (gastroesophageal reflux disease)     GI (gastrointestinal bleed) 02/02/2018    High cholesterol     History of  chemotherapy     History of radiation therapy     History of transfusion     Hypertension     Leg weakness, bilateral     Lumbosacral disc disease December 2024    Peripheral neuropathy     Small lymphocytic lymphoma     Spinal stenosis     LUMBAR    TIA (transient ischemic attack) 2019       Past Surgical History:   Procedure Laterality Date    APPENDECTOMY      BACK SURGERY  12/172024    CATARACT EXTRACTION      CHOLECYSTECTOMY  01/2012    for gall bladder cancer, removed by Dr. Kendy Martinez    COLONOSCOPY  approx 2013    Too YUEN,  normal per patient    COLONOSCOPY N/A 11/29/2022    Procedure: COLONOSCOPY to cecum and TI:  bx polyps, cold snare polyp;  Surgeon: Janet Hamilton MD;  Location: [facility] END[facility]COPY;  Service: Gastroenterology;  Laterality: N/A;  pre:  screening  post:  polyps, divertidulosis, hemorrhoids    ENDOSCOPY N/A 04/04/2019    Procedure: ESOPHAGOGASTRODUODENOSCOPY with biopsy;  Surgeon: Ruby Ferrari MD;  Location: [facility] ENDOSCOPY;  Service: Gastroenterology    LAMINECTOMY  12/17/2024    LUMBAR LAMINECTOMY DISCECTOMY DECOMPRESSION N/A 12/17/2024    Procedure: Open bilateral lumbar decompression/discectomy lumbar four/five, lumbar five/sacral one;  Surgeon: Franklyn Terrazas MD;  Location: [facility] MAIN OR;  Service: Neurosurgery;  Laterality: N/A;    SHOULDER ARTHROSCOPY W/ ROTATOR CUFF REPAIR Right 09/28/2017    Procedure: RT SHOULDER ARTHROSCOPY ACROMIOPLASTY ROTATOR CUFF REPAIR AND LABRAL DEBRIDEMENT;  Surgeon: Nicho Hill MD;  Location: [facility];  Service:     UPPER GASTROINTESTINAL ENDOSCOPY  02/02/2018    [facility]    multipal duodenal ulcers in D1-D2 area, no active bleed    UPPER GASTROINTESTINAL ENDOSCOPY  01/31/2018    [facility]   oozing blood of small ulcer in D1-D2 region, clot overlying ulcer, injected at base, endo clips placed    UPPER GASTROINTESTINAL ENDOSCOPY  03/14/2018    [facility]   previous ulcer almost healed    US  "GUIDED LYMPH NODE BIOPSY  05/02/2024       Family History   Problem Relation Age of Onset    Ulcers Mother     Kidney failure Father     Hypertension Father     Hypertension Brother     No Known Problems Son     No Known Problems Son     Breast cancer Neg Hx     Ovarian cancer Neg Hx     Uterine cancer Neg Hx     Colon cancer Neg Hx     Malig Hyperthermia Neg Hx        Social History     Socioeconomic History    Marital status:      Spouse name: TRACEY    Number of children: 4   Tobacco Use    Smoking status: Never     Passive exposure: Never    Smokeless tobacco: Never   Vaping Use    Vaping status: Never Used   Substance and Sexual Activity    Alcohol use: No    Drug use: No    Sexual activity: Not Currently     Partners: Male     Birth control/protection: Post-menopausal     Comment: spouse = TRACEY        -------      REVIEW OF PERTINENT MEDICAL DATA    E-isaura report is reviewed:  I reviewed the document in the electronic form under the PDMP tab in the Epic EMR...  - In this function, the report is a current report in as close to real-time as possible.  - The report was available for immediate review.    - I did tiki the report as reviewed.  - There is not concern for aberrant behavior based on this ekasper review.    She had a CBC on January 22, 2025 and the platelet count was 224,000.    She had an MRI from January 30, 2025.  She has decompressive laminectomy at L4-5.  There is epidural enhancement L4-5 and also L5-S1 and the radiologist notes consistency with granulation tissue.  There is moderate canal stenosis and mild to moderate foraminal stenosis bilaterally at L3-4 above the laminectomy level.    I reviewed the last neurosurgery visit with the referring clinician which was from February 2025.  Complaining of \"significant pain in both of her legs\", this is improving some and she had started a low-dose gabapentin and was using hydrocodone as well as a muscle relaxant.  On exam she noted negative " straight leg raise maneuver bilaterally.  She noted degenerative osteophyte change had L2-3 and L3-4 contributing to some mild to moderate canal stenosis and noted laminectomy L4-5 and L5-S1.  She expressed concern about becoming symptomatic from the lower lumbar stenosis now which is a change.    [place] = 20    Review of Systems   Constitutional:  Positive for activity change (decreased) and fatigue. Negative for chills and fever.   HENT:  Negative for congestion.    Eyes:  Negative for visual disturbance.   Respiratory:  Negative for chest tightness and shortness of breath.    Cardiovascular:  Negative for chest pain.   Gastrointestinal:  Negative for abdominal pain, constipation and diarrhea.   Genitourinary:  Negative for difficulty urinating, dyspareunia and dysuria.   Musculoskeletal:  Positive for back pain.   Neurological:  Positive for dizziness, weakness and numbness. Negative for light-headedness and headaches.   Psychiatric/Behavioral:  Positive for agitation and sleep disturbance. Negative for self-injury and suicidal ideas. The patient is nervous/anxious.            Vitals:    03/03/25 0930   BP: 151/88   Pulse: 91   Temp: 97.7 °F (36.5 °C)   SpO2: 96%   Weight: 68.6 kg (151 lb 3.2 oz)   PainSc: 6    PainLoc: Back         Objective       Physical Exam  Vitals and nursing note reviewed.   Constitutional:       General: She is not in acute distress.     Appearance: Normal appearance. She is well-developed. She is not toxic-appearing.   HENT:      Head: Normocephalic and atraumatic.      Right Ear: Hearing and external ear normal.      Left Ear: Hearing and external ear normal.      Nose: Nose normal.   Eyes:      General: Lids are normal.      Conjunctiva/sclera: Conjunctivae normal.      Pupils: Pupils are equal, round, and reactive to light.   Pulmonary:      Effort: Pulmonary effort is normal. No respiratory distress.   Musculoskeletal:      Lumbar back: No edema or spasms. Negative right straight leg  "raise test and negative left straight leg raise test.      Comments: There was a small \"knot\" tender area just a centimeter and a half lateral and superior to the top of the healed midline incision.  That was mildly tender.  Lumbar loading was negative.  Sacroiliac provocation maneuvers were negative.   Neurological:      Mental Status: She is alert and oriented to person, place, and time.      Cranial Nerves: Cranial nerves 2-12 are intact. No cranial nerve deficit.      Gait: Gait is intact.      Comments: Mild weakness and right hip flexion and knee extension, 5- out of 5 compared to the left.   Psychiatric:         Behavior: Behavior normal.         Assessment & Plan   Diagnoses and all orders for this visit:    1. Lumbar radiculopathy (Primary)    2. Status post lumbar spine surgery for decompression of spinal cord    3. Bilateral lumbar radiculopathy    4. Spinal stenosis of lumbar region with radiculopathy        --- Imani Shankar reports a pain score of 6.  Given her pain assessment as noted, treatment options were discussed and the following options were decided upon as a follow-up plan to address the patient's pain: educational materials on pain management and steroid injections.    --- Follow-up for diagnostic LTFESI, anticipate bilaterally L3       ROBERT REPORT  ROBERT report has been reviewed and scanned into the patient's chart.  Date of last ROBERT : as above.  The current use of opioids is very sparing & acute intermittent use.        Dictated utilizing Dragon dictation.     --      ---  Indications for epidural injection:  Plan is to proceed with epidural at the appropriate level.  If the patient receives significant pain reduction and improvement in function and the plan will be to repeat the epidural when the pain worsens.  If a second epidural provides at least 6 weeks of sustained improvement that includes both pain reduction and improvement in function then an epidural injection could be " repeated once again at the same level.  This is a mutual decision between the clinician and the patient that includes discussions including risks and benefits in detail as well as alternative therapies.  Patient's questions were answered to their satisfaction and to their understanding.  ---    Risk of serious complications from epidural injections:  best estimates of incidence (updated September 2021)  - These statistics are derived from several academic publications.    - The disclaimer is that this may not be a completely exhaustive list, and this does not address common side effects from procedures such as postanesthesia care unit nausea, procedural site soreness, numbness from local anesthetic numbing medication, etc.    - This list does comprehensively hope to address reasonable potential serious complications from these interventional neuraxial procedures, and thankfully all are quite rare.    - Safety from interventional pain procedures is the product of appropriate skill and training, and this includes the entire medical and nursing team understanding of these procedures and the process, fellowship trained and board certified interventional pain specialists, and use of appropriate imaging modalities to confirm the appropriate application of the techniques  -Overall incidence of any serious complication that includes temporary and reversible complications has been estimated at 0.11%.  Overall incidence of serious complications requiring additional medical treatment is estimated at <1 in  20,000 (0.02%)   -Incidence of an infection after a procedure is estimated at <1:50,000, and risk of a severe infection such as epidural abscess estimated at less than 1 in 500,000  -Risk of severe bleeding, epidural hematoma, is somewhere between 1 and 150,000-220,000  - Temporary numbness or short-term paralysis after injection is very rare.  At this time I have not been able to find a definitive number on reversible nerve  "problems after injection.  There have been 114 total claims of paralysis after epidural injection in the [place] over the past 30 years.  In context, there are about 9 million epidural injections performed in the [place] every year.  - Risk of death after procedure is so incredibly rare and cannot be estimated.  There are statistics about maternal mortality when epidurals are used in the obstetric setting, but these numbers are not relevant to interventional pain  -Risk of bone loss or osteoporosis from epidural steroid injections has not been shown to be an independent risk factor.  This was noted from research from the journal Pain Physician in 2012.  In our practice, we still want to be careful not to give too many steroids too often and avoid high annual total doses of steroids  -Inpatient see you have significant depression or juan antonio are other psychiatric comorbidities, there is a less than 1% chance of the spacings having mood problems after an epidural steroid injection.  We have to weigh that risk, which is somewhere between 0.01% and 0.1% with the risk of worsening of the psychiatric comorbidities because of depressive symptoms associated with chronic pain or severe pain.  There has been one case report of a person without any psychiatric history having what was called \"steroid juan antonio\" after having epidural steroid injection.  -With regards to blood glucose levels, we have long been concerned about hyperglycemia after giving steroids.  There has not been shown to be any association with increasing blood sugar or increasing risk of diabetes in patients who do not already have diabetes.  There is evidence that patients with diabetes may have increased blood sugar levels for 1 to 2 days after an epidural steroid injection, and this may be a  point increase, but it resolves within 24 hours.  There is no association with increasing hemoglobin A1c with epidural steroid injections which is " reassuring regarding short or even long-term detrimental effects even in patients with diabetes  -Steroids given in the epidural space do not seem to affect the adrenal glands in the same way that steroids do that are given intravenously or in muscular injections or an pill form to the GI tract.  Adrenal gland suppression from epidural steroids is shown to be quite rare, and temporary when it does occur.  -Risk of dural puncture, which is a leak of spinal fluid, is estimated somewhere between 0.1% and 1%.  Again this data includes epidural injections being placed in the obstetric setting as well as interventional pain setting, and it is the opinion of this practice that with the assistance of image guidance in a controlled setting that the risk of a dural puncture is lower than it would be as compared to using nonimage guided techniques placing an epidural in a woman in labor.    Reviewed the procedure at length with the patient.  Included in the review was expectations, complications, risk and benefits.The procedure was described in detail and the risks, benefits and alternatives were discussed with the patient (including but not limited to: bleeding, infection, nerve damage, worsening of pain, inability to perform injection, paralysis, seizures, coma, no pain relief and death) who agreed to proceed.  Discussed the potential for sedation if warranted/wanted.  The procedure will plan to be performed at [facility] with fluoroscopic guidance(unless ultrasound is indicated) and could potentially have steroids and contrast dye used. Questions were answered and in a way the patient could understand.  Patient verbalized understanding and wishes to proceed.  This intervention will be ordered.  Discussed with patient that all procedures are part of a multimodal plan of care and include either formal PT or a home exercise program.  Patient has no evidence of coagulopathy or current  infection.    --    Vitals:    03/03/25 0930   PainSc: 6    PainLoc: Back

## 2025-03-03 NOTE — H&P (VIEW-ONLY)
CHIEF COMPLAINT  Patient was referred by ALICIA Yun for back pain. She describes her pain as aching and shooting that radiates into her right leg. She has some numbness in her right leg. She has had PT which made her pain worse. She had back surgery on 12/17/24. She had an epidural in 11/2024 which did not improve her pain at that time. She uses heat and ice therapy. She is currently on Tramadol, gabapentin and tizanidine which helps a little. She has tried lidocaine patches in the past. Walking, bending and sitting for long periods makes her pain worse. Laying down improves her pain some.     Subjective   Imani Shankar is a 74 y.o. female.   She presents to the office for evaluation of back pain. She was referred here by Elizabeth VARGAS at [facility] neurosurgery.  Her PCP is Dr. Chang Carter..     She has some radiating pain anteriorly and posteriorly in both legs, though worse radiating pain is more anterior in the thigh and the right leg.  She has had worsening of pain with physical therapy efforts.  There is some paresthetic symptoms with painful numbness in the right anterior leg also, this is approximating L3 dermatome.  The left has some similar symptoms but not as severe as the right.    Back Pain  This is a chronic problem. The current episode started more than 1 month ago. The problem has been worse (worsening upper lumbar area) since onset. The pain is present in the lumbar spine. The quality of the pain is described as aching, burning and shooting. The pain radiates to the right thigh, left buttock and right buttock. The pain is moderate. The symptoms are aggravated by bending, standing, twisting and sitting. Associated symptoms include numbness and weakness. Pertinent negatives include no abdominal pain, chest pain, dysuria, fever or headaches. Risk factors include menopause. Treatments tried: surgery.        PEG Assessment   What number best describes your pain on average in the past week?5 with  out medication and 3 with medication  What number best describes how, during the past week, pain has interfered with your enjoyment of life?5  What number best describes how, during the past week, pain has interfered with your general activity?  8    --  The aforementioned information the Chief Complaint section and above subjective data including any HPI data, and also the Review of Systems data, has been personally reviewed and affirmed.  --      Current Outpatient Medications:     acetaminophen (TYLENOL) 325 MG tablet, Take 2 tablets by mouth Every 4 (Four) Hours As Needed for Mild Pain., Disp: , Rfl:     aspirin 81 MG chewable tablet, Chew 1 tablet Daily., Disp: , Rfl:     gabapentin (NEURONTIN) 100 MG capsule, Take 2 capsules by mouth 2 (Two) Times a Day for 90 days., Disp: 120 capsule, Rfl: 2    levothyroxine (SYNTHROID, LEVOTHROID) 100 MCG tablet, Take 1 tablet by mouth Daily., Disp: 90 tablet, Rfl: 0    loratadine (CLARITIN) 10 MG tablet, Take 1 tablet by mouth Daily As Needed., Disp: , Rfl:     metoprolol succinate XL (TOPROL-XL) 50 MG 24 hr tablet, Take 1 tablet by mouth Daily., Disp: , Rfl:     multivitamin with minerals (MULTIVITAMIN ADULT PO), Take 1 tablet by mouth Daily. HOLDING FOR DOS, Disp: , Rfl:     omeprazole (priLOSEC) 40 MG capsule, Take 1 capsule by mouth Daily., Disp: , Rfl:     pravastatin (PRAVACHOL) 40 MG tablet, TAKE 1 TABLET BY MOUTH DAILY (Patient taking differently: Take 1 tablet by mouth Every Night.), Disp: 90 tablet, Rfl: 3    tiZANidine (ZANAFLEX) 4 MG tablet, Take 1 tablet by mouth Every 8 (Eight) Hours As Needed for Muscle Spasms., Disp: 30 tablet, Rfl: 0    traMADol (ULTRAM) 50 MG tablet, Take 1 tablet by mouth Every 6 (Six) Hours., Disp: , Rfl:     The following portions of the patient's history were reviewed and updated as appropriate: allergies, current medications, past family history, past medical history, past social history, past surgical history, and problem  list.    -------    The following portions of the patient's history were reviewed and updated as appropriate: allergies, current medications, past family history, past medical history, past social history, past surgical history and problem list.    No Known Allergies    Current Outpatient Medications on File Prior to Visit   Medication Sig Dispense Refill    acetaminophen (TYLENOL) 325 MG tablet Take 2 tablets by mouth Every 4 (Four) Hours As Needed for Mild Pain.      aspirin 81 MG chewable tablet Chew 1 tablet Daily.      gabapentin (NEURONTIN) 100 MG capsule Take 2 capsules by mouth 2 (Two) Times a Day for 90 days. 120 capsule 2    levothyroxine (SYNTHROID, LEVOTHROID) 100 MCG tablet Take 1 tablet by mouth Daily. 90 tablet 0    loratadine (CLARITIN) 10 MG tablet Take 1 tablet by mouth Daily As Needed.      metoprolol succinate XL (TOPROL-XL) 50 MG 24 hr tablet Take 1 tablet by mouth Daily.      multivitamin with minerals (MULTIVITAMIN ADULT PO) Take 1 tablet by mouth Daily. HOLDING FOR DOS      omeprazole (priLOSEC) 40 MG capsule Take 1 capsule by mouth Daily.      pravastatin (PRAVACHOL) 40 MG tablet TAKE 1 TABLET BY MOUTH DAILY (Patient taking differently: Take 1 tablet by mouth Every Night.) 90 tablet 3    tiZANidine (ZANAFLEX) 4 MG tablet Take 1 tablet by mouth Every 8 (Eight) Hours As Needed for Muscle Spasms. 30 tablet 0    traMADol (ULTRAM) 50 MG tablet Take 1 tablet by mouth Every 6 (Six) Hours.       No current facility-administered medications on file prior to visit.         * No active hospital problems. *       Past Medical History:   Diagnosis Date    Arthritis     Bleeding ulcer 2018    Cancer 01/2012    gallbladder - removal w/adjunct chemo and XRT (Dr. Kraft, Dr. Lanier)    Chronic low back pain     Disease of thyroid gland     Duodenal ulcer     Extremity pain Left leg pain    GERD (gastroesophageal reflux disease)     GI (gastrointestinal bleed) 02/02/2018    High cholesterol     History of  chemotherapy     History of radiation therapy     History of transfusion     Hypertension     Leg weakness, bilateral     Lumbosacral disc disease December 2024    Peripheral neuropathy     Small lymphocytic lymphoma     Spinal stenosis     LUMBAR    TIA (transient ischemic attack) 2019       Past Surgical History:   Procedure Laterality Date    APPENDECTOMY      BACK SURGERY  12/172024    CATARACT EXTRACTION      CHOLECYSTECTOMY  01/2012    for gall bladder cancer, removed by Dr. Kendy Martinez    COLONOSCOPY  approx 2013    Too YUEN,  normal per patient    COLONOSCOPY N/A 11/29/2022    Procedure: COLONOSCOPY to cecum and TI:  bx polyps, cold snare polyp;  Surgeon: aJnet Hamilton MD;  [facility];  Service: Gastroenterology;  Laterality: N/A;  pre:  screening  post:  polyps, divertidulosis, hemorrhoids    ENDOSCOPY N/A 04/04/2019    Procedure: ESOPHAGOGASTRODUODENOSCOPY with biopsy;  Surgeon: Ruby Ferrari MD;  Location: [facility] ENDOSCOPY;  Service: Gastroenterology    LAMINECTOMY  12/17/2024    LUMBAR LAMINECTOMY DISCECTOMY DECOMPRESSION N/A 12/17/2024    Procedure: Open bilateral lumbar decompression/discectomy lumbar four/five, lumbar five/sacral one;  Surgeon: Franklyn Terrazas MD;  Location: [facility] MAIN OR;  Service: Neurosurgery;  Laterality: N/A;    SHOULDER ARTHROSCOPY W/ ROTATOR CUFF REPAIR Right 09/28/2017    Procedure: RT SHOULDER ARTHROSCOPY ACROMIOPLASTY ROTATOR CUFF REPAIR AND LABRAL DEBRIDEMENT;  Surgeon: Nicho Hill MD;  [facility];  Service:     UPPER GASTROINTESTINAL ENDOSCOPY  02/02/2018    [facility]    multipal duodenal ulcers in D1-D2 area, no active bleed    UPPER GASTROINTESTINAL ENDOSCOPY  01/31/2018    [facility]   oozing blood of small ulcer in D1-D2 region, clot overlying ulcer, injected at base, endo clips placed    UPPER GASTROINTESTINAL ENDOSCOPY  03/14/2018    [facility]   previous ulcer almost healed    US  "GUIDED LYMPH NODE BIOPSY  05/02/2024       Family History   Problem Relation Age of Onset    Ulcers Mother     Kidney failure Father     Hypertension Father     Hypertension Brother     No Known Problems Son     No Known Problems Son     Breast cancer Neg Hx     Ovarian cancer Neg Hx     Uterine cancer Neg Hx     Colon cancer Neg Hx     Malig Hyperthermia Neg Hx        Social History     Socioeconomic History    Marital status:      Spouse name: TRACEY    Number of children: 4   Tobacco Use    Smoking status: Never     Passive exposure: Never    Smokeless tobacco: Never   Vaping Use    Vaping status: Never Used   Substance and Sexual Activity    Alcohol use: No    Drug use: No    Sexual activity: Not Currently     Partners: Male     Birth control/protection: Post-menopausal     Comment: spouse = TRACEY        -------      REVIEW OF PERTINENT MEDICAL DATA    E-isaura report is reviewed:  I reviewed the document in the electronic form under the PDMP tab in the Epic EMR...  - In this function, the report is a current report in as close to real-time as possible.  - The report was available for immediate review.    - I did tiki the report as reviewed.  - There is not concern for aberrant behavior based on this ekasper review.    She had a CBC on January 22, 2025 and the platelet count was 224,000.    She had an MRI from January 30, 2025.  She has decompressive laminectomy at L4-5.  There is epidural enhancement L4-5 and also L5-S1 and the radiologist notes consistency with granulation tissue.  There is moderate canal stenosis and mild to moderate foraminal stenosis bilaterally at L3-4 above the laminectomy level.    I reviewed the last neurosurgery visit with the referring clinician which was from February 2025.  Complaining of \"significant pain in both of her legs\", this is improving some and she had started a low-dose gabapentin and was using hydrocodone as well as a muscle relaxant.  On exam she noted negative " straight leg raise maneuver bilaterally.  She noted degenerative osteophyte change had L2-3 and L3-4 contributing to some mild to moderate canal stenosis and noted laminectomy L4-5 and L5-S1.  She expressed concern about becoming symptomatic from the lower lumbar stenosis now which is a change.    [place] = 20    Review of Systems   Constitutional:  Positive for activity change (decreased) and fatigue. Negative for chills and fever.   HENT:  Negative for congestion.    Eyes:  Negative for visual disturbance.   Respiratory:  Negative for chest tightness and shortness of breath.    Cardiovascular:  Negative for chest pain.   Gastrointestinal:  Negative for abdominal pain, constipation and diarrhea.   Genitourinary:  Negative for difficulty urinating, dyspareunia and dysuria.   Musculoskeletal:  Positive for back pain.   Neurological:  Positive for dizziness, weakness and numbness. Negative for light-headedness and headaches.   Psychiatric/Behavioral:  Positive for agitation and sleep disturbance. Negative for self-injury and suicidal ideas. The patient is nervous/anxious.            Vitals:    03/03/25 0930   BP: 151/88   Pulse: 91   Temp: 97.7 °F (36.5 °C)   SpO2: 96%   Weight: 68.6 kg (151 lb 3.2 oz)   PainSc: 6    PainLoc: Back         Objective       Physical Exam  Vitals and nursing note reviewed.   Constitutional:       General: She is not in acute distress.     Appearance: Normal appearance. She is well-developed. She is not toxic-appearing.   HENT:      Head: Normocephalic and atraumatic.      Right Ear: Hearing and external ear normal.      Left Ear: Hearing and external ear normal.      Nose: Nose normal.   Eyes:      General: Lids are normal.      Conjunctiva/sclera: Conjunctivae normal.      Pupils: Pupils are equal, round, and reactive to light.   Pulmonary:      Effort: Pulmonary effort is normal. No respiratory distress.   Musculoskeletal:      Lumbar back: No edema or spasms. Negative right straight leg  "raise test and negative left straight leg raise test.      Comments: There was a small \"knot\" tender area just a centimeter and a half lateral and superior to the top of the healed midline incision.  That was mildly tender.  Lumbar loading was negative.  Sacroiliac provocation maneuvers were negative.   Neurological:      Mental Status: She is alert and oriented to person, place, and time.      Cranial Nerves: Cranial nerves 2-12 are intact. No cranial nerve deficit.      Gait: Gait is intact.      Comments: Mild weakness and right hip flexion and knee extension, 5- out of 5 compared to the left.   Psychiatric:         Behavior: Behavior normal.         Assessment & Plan   Diagnoses and all orders for this visit:    1. Lumbar radiculopathy (Primary)    2. Status post lumbar spine surgery for decompression of spinal cord    3. Bilateral lumbar radiculopathy    4. Spinal stenosis of lumbar region with radiculopathy        --- Imani Shankar reports a pain score of 6.  Given her pain assessment as noted, treatment options were discussed and the following options were decided upon as a follow-up plan to address the patient's pain: educational materials on pain management and steroid injections.    --- Follow-up for diagnostic LTFESI, anticipate bilaterally L3       ROBERT REPORT  ROBERT report has been reviewed and scanned into the patient's chart.  Date of last ROBERT : as above.  The current use of opioids is very sparing & acute intermittent use.        Dictated utilizing Dragon dictation.     --      ---  Indications for epidural injection:  Plan is to proceed with epidural at the appropriate level.  If the patient receives significant pain reduction and improvement in function and the plan will be to repeat the epidural when the pain worsens.  If a second epidural provides at least 6 weeks of sustained improvement that includes both pain reduction and improvement in function then an epidural injection could be " repeated once again at the same level.  This is a mutual decision between the clinician and the patient that includes discussions including risks and benefits in detail as well as alternative therapies.  Patient's questions were answered to their satisfaction and to their understanding.  ---    Risk of serious complications from epidural injections:  best estimates of incidence (updated September 2021)  - These statistics are derived from several academic publications.    - The disclaimer is that this may not be a completely exhaustive list, and this does not address common side effects from procedures such as postanesthesia care unit nausea, procedural site soreness, numbness from local anesthetic numbing medication, etc.    - This list does comprehensively hope to address reasonable potential serious complications from these interventional neuraxial procedures, and thankfully all are quite rare.    - Safety from interventional pain procedures is the product of appropriate skill and training, and this includes the entire medical and nursing team understanding of these procedures and the process, fellowship trained and board certified interventional pain specialists, and use of appropriate imaging modalities to confirm the appropriate application of the techniques  -Overall incidence of any serious complication that includes temporary and reversible complications has been estimated at 0.11%.  Overall incidence of serious complications requiring additional medical treatment is estimated at <1 in  20,000 (0.02%)   -Incidence of an infection after a procedure is estimated at <1:50,000, and risk of a severe infection such as epidural abscess estimated at less than 1 in 500,000  -Risk of severe bleeding, epidural hematoma, is somewhere between 1 and 150,000-220,000  - Temporary numbness or short-term paralysis after injection is very rare.  At this time I have not been able to find a definitive number on reversible nerve  "problems after injection.  There have been 114 total claims of paralysis after epidural injection in the [place] over the past 30 years.  In context, there are about 9 million epidural injections performed in the [place] every year.  - Risk of death after procedure is so incredibly rare and cannot be estimated.  There are statistics about maternal mortality when epidurals are used in the obstetric setting, but these numbers are not relevant to interventional pain  -Risk of bone loss or osteoporosis from epidural steroid injections has not been shown to be an independent risk factor.  This was noted from research from the journal Pain Physician in 2012.  In our practice, we still want to be careful not to give too many steroids too often and avoid high annual total doses of steroids  -Inpatient see you have significant depression or juan antonio are other psychiatric comorbidities, there is a less than 1% chance of the spacings having mood problems after an epidural steroid injection.  We have to weigh that risk, which is somewhere between 0.01% and 0.1% with the risk of worsening of the psychiatric comorbidities because of depressive symptoms associated with chronic pain or severe pain.  There has been one case report of a person without any psychiatric history having what was called \"steroid juan antonio\" after having epidural steroid injection.  -With regards to blood glucose levels, we have long been concerned about hyperglycemia after giving steroids.  There has not been shown to be any association with increasing blood sugar or increasing risk of diabetes in patients who do not already have diabetes.  There is evidence that patients with diabetes may have increased blood sugar levels for 1 to 2 days after an epidural steroid injection, and this may be a  point increase, but it resolves within 24 hours.  There is no association with increasing hemoglobin A1c with epidural steroid injections which is " reassuring regarding short or even long-term detrimental effects even in patients with diabetes  -Steroids given in the epidural space do not seem to affect the adrenal glands in the same way that steroids do that are given intravenously or in muscular injections or an pill form to the GI tract.  Adrenal gland suppression from epidural steroids is shown to be quite rare, and temporary when it does occur.  -Risk of dural puncture, which is a leak of spinal fluid, is estimated somewhere between 0.1% and 1%.  Again this data includes epidural injections being placed in the obstetric setting as well as interventional pain setting, and it is the opinion of this practice that with the assistance of image guidance in a controlled setting that the risk of a dural puncture is lower than it would be as compared to using nonimage guided techniques placing an epidural in a woman in labor.    Reviewed the procedure at length with the patient.  Included in the review was expectations, complications, risk and benefits.The procedure was described in detail and the risks, benefits and alternatives were discussed with the patient (including but not limited to: bleeding, infection, nerve damage, worsening of pain, inability to perform injection, paralysis, seizures, coma, no pain relief and death) who agreed to proceed.  Discussed the potential for sedation if warranted/wanted.  The procedure will plan to be performed at [facility] with fluoroscopic guidance(unless ultrasound is indicated) and could potentially have steroids and contrast dye used. Questions were answered and in a way the patient could understand.  Patient verbalized understanding and wishes to proceed.  This intervention will be ordered.  Discussed with patient that all procedures are part of a multimodal plan of care and include either formal PT or a home exercise program.  Patient has no evidence of coagulopathy or current  infection.    --    Vitals:    03/03/25 0930   PainSc: 6    PainLoc: Back

## 2025-03-04 ENCOUNTER — PREP FOR SURGERY (OUTPATIENT)
Dept: OTHER | Facility: HOSPITAL | Age: 75
End: 2025-03-04
Payer: MEDICARE

## 2025-03-04 ENCOUNTER — PREP FOR SURGERY (OUTPATIENT)
Dept: SURGERY | Facility: SURGERY CENTER | Age: 75
End: 2025-03-04
Payer: MEDICARE

## 2025-03-04 DIAGNOSIS — M54.16 BILATERAL LUMBAR RADICULOPATHY: Primary | ICD-10-CM

## 2025-03-05 ENCOUNTER — HOSPITAL ENCOUNTER (OUTPATIENT)
Dept: PAIN MEDICINE | Facility: HOSPITAL | Age: 75
Discharge: HOME | End: 2025-03-05
Payer: MEDICARE

## 2025-03-05 ENCOUNTER — HOSPITAL ENCOUNTER (OUTPATIENT)
Dept: GENERAL RADIOLOGY | Facility: HOSPITAL | Age: 75
Discharge: HOME | End: 2025-03-05
Payer: MEDICARE

## 2025-03-05 VITALS
HEART RATE: 65 BPM | TEMPERATURE: 97.3 F | RESPIRATION RATE: 16 BRPM | DIASTOLIC BLOOD PRESSURE: 67 MMHG | OXYGEN SATURATION: 97 % | SYSTOLIC BLOOD PRESSURE: 124 MMHG

## 2025-03-05 DIAGNOSIS — R52 PAIN: ICD-10-CM

## 2025-03-05 DIAGNOSIS — M54.16 LUMBAR RADICULOPATHY: Primary | ICD-10-CM

## 2025-03-05 PROCEDURE — 25510000001 IOPAMIDOL 41 % SOLUTION: Performed by: ANESTHESIOLOGY

## 2025-03-05 PROCEDURE — 63710000001 DIAZEPAM 5 MG TABLET: Performed by: ANESTHESIOLOGY

## 2025-03-05 PROCEDURE — A9270 NON-COVERED ITEM OR SERVICE: HCPCS | Performed by: ANESTHESIOLOGY

## 2025-03-05 PROCEDURE — 77003 FLUOROGUIDE FOR SPINE INJECT: CPT

## 2025-03-05 PROCEDURE — 25010000002 BUPIVACAINE (PF) 0.25 % SOLUTION: Performed by: ANESTHESIOLOGY

## 2025-03-05 PROCEDURE — 25010000002 METHYLPREDNISOLONE PER 80 MG: Performed by: ANESTHESIOLOGY

## 2025-03-05 RX ORDER — DIAZEPAM 5 MG/1
10 TABLET ORAL ONCE
Status: COMPLETED | OUTPATIENT
Start: 2025-03-05 | End: 2025-03-05

## 2025-03-05 RX ORDER — LIDOCAINE HYDROCHLORIDE 10 MG/ML
5 INJECTION, SOLUTION INFILTRATION; PERINEURAL ONCE
Status: DISCONTINUED | OUTPATIENT
Start: 2025-03-05 | End: 2025-03-06 | Stop reason: HOSPADM

## 2025-03-05 RX ORDER — BUPIVACAINE HYDROCHLORIDE 2.5 MG/ML
10 INJECTION, SOLUTION EPIDURAL; INFILTRATION; INTRACAUDAL ONCE
Status: COMPLETED | OUTPATIENT
Start: 2025-03-05 | End: 2025-03-05

## 2025-03-05 RX ORDER — GABAPENTIN 300 MG/1
300 CAPSULE ORAL 3 TIMES DAILY
Qty: 90 CAPSULE | Refills: 0 | Status: SHIPPED | OUTPATIENT
Start: 2025-03-05

## 2025-03-05 RX ORDER — METHYLPREDNISOLONE ACETATE 80 MG/ML
80 INJECTION, SUSPENSION INTRA-ARTICULAR; INTRALESIONAL; INTRAMUSCULAR; SOFT TISSUE ONCE
Status: COMPLETED | OUTPATIENT
Start: 2025-03-05 | End: 2025-03-05

## 2025-03-05 RX ORDER — IOPAMIDOL 408 MG/ML
12 INJECTION, SOLUTION INTRATHECAL
Status: COMPLETED | OUTPATIENT
Start: 2025-03-05 | End: 2025-03-05

## 2025-03-05 RX ADMIN — IOPAMIDOL 10 ML: 408 INJECTION, SOLUTION INTRATHECAL at 09:13

## 2025-03-05 RX ADMIN — DIAZEPAM 10 MG: 5 TABLET ORAL at 08:10

## 2025-03-05 RX ADMIN — BUPIVACAINE HYDROCHLORIDE 10 ML: 2.5 INJECTION, SOLUTION EPIDURAL; INFILTRATION; INTRACAUDAL; PERINEURAL at 09:13

## 2025-03-05 RX ADMIN — METHYLPREDNISOLONE ACETATE 80 MG: 80 INJECTION, SUSPENSION INTRA-ARTICULAR; INTRALESIONAL; INTRAMUSCULAR; SOFT TISSUE at 09:13

## 2025-03-05 NOTE — INTERVAL H&P NOTE
H&P reviewed. The patient was examined and there are no changes to the H&P.    Mallampati:   II (hard and soft palate, upper portion of tonsils anduvula visible)    ASA Physical Status Classification: ASA 3 - Patient with moderate systemic disease with functional limitations    Preprocedure/preassessment plan is as noted.

## 2025-03-05 NOTE — DISCHARGE INSTRUCTIONS
[facility] - Post-procedure Instructions          --  While there are no absolute restrictions, it is recommended that you do not perform strenuous activity today. In the morning, you may resume your level of activity as before your block.    --  If you have a band-aid at your injection site, please remove it later today. Observe the area for any redness, swelling, pus-like drainage, or a temperature over 101°. If any of these symptoms occur, please call your doctor at [phone]. If after office hours, leave a message and the on-call provider will return your call.    --  Ice may be applied to your injection site. It is recommended you avoid direct heat (heating pad; hot tub) for 1-2 days.    --  Call [facility] at [phone] if you experience persistent headache, persistent bleeding from the injection site, or severe pain not relieved by heat or oral medication.    --  Do not make important decisions today.    --  Due to the effects of the block and/or the I.V. Sedation, DO NOT drive or operate hazardous machinery for 12 hours.  Local anesthetics may cause numbness after procedure and precautions must be taken with regards to operating equipment as well as with walking, even if ambulating with assistance of another person or with an assistive device.    --  Do not drink alcohol for 12 hours.    -- You may return to work tomorrow, or as directed by your referring doctor.    --  Occasionally you may notice a slight increase in your pain after the procedure. This should start to improve within the next 24-48 hours. Radiofrequency ablation procedure pain may last 3-4 weeks.    --  It may take as long as 3-4 days before you notice a gradual improvement in your pain and/or other symptoms.    -- You may continue to take your prescribed pain medication as needed.    --  Some normal possible side effects of steroid use could include fluid retention, increased blood sugar, dull headache,  increased sweating, increased appetite, mood swings and flushing.    --  Diabetics are recommended to watch their blood glucose level closely for 24-48 hours after the injection.    --  Must stay in PACU for 20 min upon arrival and prove no leg weakness before being discharged.    --  IN THE EVENT OF A LIFE THREATENING EMERGENCY, (CHEST PAIN, BREATHING DIFFICULTIES, PARALYSIS…) YOU SHOULD GO TO YOUR NEAREST EMERGENCY ROOM.    --  You should be contacted by our office within 2-3 days to schedule follow up or next appointment date.  If not contacted within 7 days, please call the office at [phone]

## 2025-03-05 NOTE — PROCEDURES
Procedures    Bilateral L3 Lumbar Transforaminal Epidural Steroid Injection  [facility]    PREOPERATIVE DIAGNOSIS:  Lumbar Radiculopathy    POSTOPERATIVE DIAGNOSIS:  Same as preop diagnosis    PROCEDURE:  CPT 64128(-50) --  Diagnostic Transforaminal Epidural Steroid Injection at the L3 level, bilaterally    PRE-PROCEDURE DISCUSSION WITH PATIENT:    Risks and complications were discussed with the patient prior to starting the procedure and informed consent was obtained.  We discussed various topics including but not limited to bleeding, infection, injury, nerve injury, paralysis, coma, death, postprocedural painful flare-up, postprocedural site soreness, and a lack of pain relief.  We discussed the diagnostic aspect of transforaminal epidural / selective nerve root blockade.    Preprocedure assessment/presedation assessment is noted as the H&P/H&P update as part of this Epic chart encounter.  Preassessment plan and preprocedure airway assessment are noted.  Immediate in the room airway assessment is unchanged from the preprocedure assessment performed in the preoperative area a few minutes ago.      SURGEON:  Raymond Cheek MD    REASON FOR PROCEDURE:    Degenerative changes are noted in the area., Stenotic area is noted, and is likely contributing to this chronic &/or recurrent pain. , and Radiating pattern of pain is likely consistent with degenerative changes in the area.    SEDATION:  The patient had higher than average levels of procedural anxiety and the need to provide additional procedural sedation was needed to safely proceed. and she received 10mg oral diazepam in the preop area  ANESTHETIC:  Marcaine 0.25%  STEROID:  Methylprednisolone (DEPO MEDROL) 80mg/ml    DESCRIPTON OF PROCEDURE:  After obtaining informed consent, an I.V. was not started in the preoperative area. The patient taken to the operating room and was placed in the prone position with a pillow under the abdomen.  All pressure  points were well padded.  EKG, blood pressure, and pulse oximeter were monitored.  The lumbosacral area was prepped with Chloraprep and draped in a sterile fashion. Under fluoroscopic guidance in an oblique dimension, the transverse process of the aforementioned vertebra at the junction of the body at 6 o'clock position on one side was identified. Skin and subcutaneous tissue was anesthetized with 1% lidocaine. A 22-gauge spinal needle was introduced under fluoroscopic guidance at the above junction into the foramen without parasthesias and into the epidural space. After confirming the position of the needle with PA, lateral, and oblique fluoroscopic views, aspiration was checked and was clear of blood or CSF.  Next, 1 mL of Omnipaque was injected. After seeing adequate spread on the corresponding nerve root, a total volume 2mL of injectate containing 0.5ml of the above mentioned local anesthetic, 1 ml saline,  and half of the above mentioned corticosteroid was injected into the epidural space.    The needle was removed intact.      Next, the same vertebral level and corresponding foramen on the contralateral side was visualized with fluoroscopy in an oblique view, and a similar approach was used to place the spinal needle in that foramen.  After confirming the position of the needle with PA, lateral, and oblique fluoroscopic views, aspiration was checked and was clear of blood or CSF.  Next, 1 mL of Omnipaque was injected. After seeing adequate spread on the corresponding nerve root, a total volume 2mL of injectate containing 0.5ml of the above mentioned local anesthetic, 1 ml saline, and half of the above mentioned corticosteroid was injected into the epidural space. The needle was removed intact.  Vital signs were stable throughout.      ESTIMATED BLOOD LOSS:  <5 mL  SPECIMENS:  none    COMPLICATIONS:   No complications were noted., There was no indication of vascular uptake on live injection of contrast dye.,  There was no indication of intrathecal uptake on live injection of contrast dye., There was not any evidence of dural puncture.  , and The patient did not have any signs of postprocedure numbness nor weakness.    TOLERANCE & DISCHARGE CONDITION:    The patient tolerated the procedure well.  The patient was transported to the recovery area without difficulties.  The patient was discharged to home under the care of family in stable and satisfactory condition.    PLAN OF CARE:  The patient was given our standard instruction sheet.  The patient will Return to clinic 2-3 wks.  The patient will resume all medications as per the medication reconciliation sheet.

## 2025-03-13 ENCOUNTER — TELEPHONE (OUTPATIENT)
Dept: PAIN MEDICINE | Facility: CLINIC | Age: 75
End: 2025-03-13

## 2025-03-13 NOTE — TELEPHONE ENCOUNTER
Caller: Imani Shankar    Relationship to patient: Self    Best call back number: [phone]    Type of visit: FOLLOW UP    Requested date: 03/17/25 AROUND 10:00AM OR 11:00AM     If rescheduling, when is the original appointment: 03/13/25

## 2025-03-26 ENCOUNTER — PREP FOR SURGERY (OUTPATIENT)
Dept: SURGERY | Facility: SURGERY CENTER | Age: 75
End: 2025-03-26
Payer: MEDICARE

## 2025-03-26 ENCOUNTER — OFFICE VISIT (OUTPATIENT)
Dept: PAIN MEDICINE | Facility: CLINIC | Age: 75
End: 2025-03-26
Payer: MEDICARE

## 2025-03-26 VITALS
WEIGHT: 152.2 LBS | TEMPERATURE: 97.6 F | BODY MASS INDEX: 28.74 KG/M2 | HEIGHT: 61 IN | DIASTOLIC BLOOD PRESSURE: 80 MMHG | SYSTOLIC BLOOD PRESSURE: 132 MMHG | HEART RATE: 75 BPM | OXYGEN SATURATION: 96 %

## 2025-03-26 DIAGNOSIS — M48.061 SPINAL STENOSIS OF LUMBAR REGION WITH RADICULOPATHY: ICD-10-CM

## 2025-03-26 DIAGNOSIS — M53.3 SACROILIAC JOINT DYSFUNCTION OF BOTH SIDES: Primary | ICD-10-CM

## 2025-03-26 DIAGNOSIS — M53.3 SACROILIAC JOINT DYSFUNCTION OF BOTH SIDES: ICD-10-CM

## 2025-03-26 DIAGNOSIS — M54.16 LUMBAR RADICULOPATHY: Primary | ICD-10-CM

## 2025-03-26 DIAGNOSIS — M54.16 SPINAL STENOSIS OF LUMBAR REGION WITH RADICULOPATHY: ICD-10-CM

## 2025-03-26 PROCEDURE — 1159F MED LIST DOCD IN RCRD: CPT | Performed by: NURSE PRACTITIONER

## 2025-03-26 PROCEDURE — 1160F RVW MEDS BY RX/DR IN RCRD: CPT | Performed by: NURSE PRACTITIONER

## 2025-03-26 PROCEDURE — 1125F AMNT PAIN NOTED PAIN PRSNT: CPT | Performed by: NURSE PRACTITIONER

## 2025-03-26 RX ORDER — DIAZEPAM 5 MG/1
10 TABLET ORAL ONCE
OUTPATIENT
Start: 2025-03-26 | End: 2025-03-26

## 2025-03-26 NOTE — H&P (VIEW-ONLY)
CHIEF COMPLAINT  F/u back pain- Bilateral L3 Lumbar Transforaminal Epidural Steroid Injection- patient states that she has had 70% improvement since the procedure.     Subjective   Imani Shankar is a 74 y.o. female  who presents to the office for follow-up of procedure.  She completed a bilateral L3 LTFESI   on  3/5/2025 performed by Dr. Cheek for management of back pain. Patient reports 70% relief from the procedure. She continues to report ongoing pain in the sacroiliac joints bilaterally.      Pertinent surgical history---  12/17/2024 - lumbar laminectomy decompression/discectomy L4/L5 and L5/S1 (Dr. Terrazas)    Back Pain  This is a chronic problem. The problem occurs daily. The problem has been improved since onset. The pain is present in the sacro-iliac. The pain does not radiate. The pain is at a severity of 3/10. The symptoms are aggravated by position and standing (walking). Associated symptoms include numbness (right leg). Pertinent negatives include no abdominal pain, chest pain, dysuria, fever, headaches or weakness. She has tried NSAIDs, analgesics, home exercises, muscle relaxant and walking for the symptoms. The treatment provided moderate relief.        PEG Assessment   What number best describes your pain on average in the past week?3  What number best describes how, during the past week, pain has interfered with your enjoyment of life?3  What number best describes how, during the past week, pain has interfered with your general activity?  6    Review of Pertinent Medical Data ---      The following portions of the patient's history were reviewed and updated as appropriate: allergies, current medications, past family history, past medical history, past social history, past surgical history, and problem list.    Review of Systems   Constitutional:  Positive for activity change (decreased). Negative for chills, fatigue and fever.   HENT:  Negative for congestion.    Eyes:  Negative for visual  "disturbance.   Respiratory:  Negative for chest tightness and shortness of breath.    Cardiovascular:  Negative for chest pain.   Gastrointestinal:  Negative for abdominal pain, constipation and diarrhea.   Genitourinary:  Negative for difficulty urinating, dyspareunia and dysuria.   Musculoskeletal:  Positive for back pain.   Neurological:  Positive for numbness (right leg). Negative for dizziness, weakness, light-headedness and headaches.   Psychiatric/Behavioral:  Positive for agitation. Negative for self-injury, sleep disturbance and suicidal ideas. The patient is not nervous/anxious.      I have reviewed and confirmed the accuracy of the ROS as documented by the MA/LPN/RN ALICIA Page     Vitals:    03/26/25 1335   BP: 132/80   Pulse: 75   Temp: 97.6 °F (36.4 °C)   SpO2: 96%   Weight: 69 kg (152 lb 3.2 oz)   Height: 154.9 cm (61\")   PainSc: 3    PainLoc: Hip         Objective   Physical Exam  Vitals and nursing note reviewed.   Constitutional:       General: She is not in acute distress.     Appearance: Normal appearance. She is not ill-appearing.   Pulmonary:      Effort: Pulmonary effort is normal. No respiratory distress.   Musculoskeletal:      Lumbar back: Tenderness and bony tenderness present. Negative right straight leg raise test and negative left straight leg raise test.      Comments: +SI joint tenderness bilaterally, +HERMELINDA, +thigh thrust, +joint compression    Neurological:      Mental Status: She is alert and oriented to person, place, and time.      Motor: Motor function is intact. No weakness.      Gait: Gait abnormal (slowed).   Psychiatric:         Mood and Affect: Mood normal.         Behavior: Behavior normal.           Assessment & Plan   Diagnoses and all orders for this visit:    1. Lumbar radiculopathy (Primary)    2. Spinal stenosis of lumbar region with radiculopathy    3. Sacroiliac joint dysfunction of both sides        --- Bilateral SI joint injection " "    -----    Sacroiliac Joint (SIJ) Injection - Indications:   Must have all the following....  - moderate / severe low back pain in the anatomical location of the SIJ (between the upper level of the iliac crests & the gluteal fold)  - low back pain > 3 months  - clinical &/or imaging findings do not suggest any other obvious cause of lumbrosacral pain)  - at least 3 positive findings with provocation maneuvers (HERMELINDA, [place], Thigh Thrust or Posterior Shear, SI compression, SI distraction, Yeoman)  - low back pain persistent despite at least 4 weeks of conservative therapies    ------    -----    Sacroiliac Joint Procedure Requirements    - must be performed under fluoroscopy or CT guidance and with contrast.  - exception to fluoro or CT guidance with contrast are documented contrast allergy or in the event of pregnancy  - SIJ procedures are part of a multimodal plan, therefore in conjunction with conservative treatments, and the patient is part of an ongoing rehab program or home exercise program or functional restoration program  - SIJ primary index pain measured prior to injection @ beginning of session  - SIJ post procedure pain level measured at the conslusion of the session  - imaging documentation required in at least two views..... \"pre and post contrast injection views in the AP and oblique planes\"....  so is that four views ?   - Documentation must include a specific assessment of the duration of relief being consistent or inconsistent with the agent used for the injection and the specific dates the measurements were obtained using the same pain scale used at baseline  - It is deemed insufficient to report only a percentage of pain relief and/or a nonspecific statement of the duration of pain relief  - It is deemed insufficient to report a nonspecific statement regarding the improvement of previously painful movements and activities of daily living.    - If using improvement in functionality as the " "justification to proceed, then documentation about ability to perform previously painful movements should include functional assessment to \"show clinically meaningful improvement with painful movements and ADLs\"    -----    Sacroiliac Joint Procedure Limitations  - injections without above radiographic guidance are considered unnecessary  - injections of biologics are considered not medically reasonable  - use of moderate or deep sedation or general anesthesia or monitored anesthesia care is \"usually unnecessary or rarely indicated\"  - \"Even in patients with a needle phobia and anxiety, typically oral anxiolytics suffice\"  - not for treating axial spine pain above L5 nor for nonspecific low back pain  - a series of injections is not considered reasonable  - in patients with implanted devices, follow  instructions to ensure safety  - \"Patients with coexisting psychological conditions or depression related illness should be treated and stabilized prior to proceeding with interventional procedures\"  - Frequent continuation of SIJ injections over 12 months may trigger a review....   > requires pain severe enough to cause significant functional disability or vocational disability   > assessment of functionality and ADLs nesessary   > at least 50% improvement for at least 3 months   > RATIONALE FOR CONTINUATION..... high risk surgical candidates, patient does not desire surgery, and/or recurrence of pain in same location was consistently relieved by SIJ injection for at least 3 months   > inform primary care clinician about continuation of procedures and prolonged repeat steroid use to allow for treatment surveillance and multidisciplinary biopsychosocial rehabilitation  - subsequent diagnostic SIJ injection is not reasonable when the initial diagnostic block does not produce at least 75% pain reduction  - subsequent therapeutic SIJ injection not reasonable when the previous therapeutic SIJ injection did not " provide at least a 50% consistent pain relief or at least 50% improvement in ability to perform movements or ADLs    -----            ---     Imani Shankar reports a pain score of 3.  Given her pain assessment as noted, treatment options were discussed and the following options were decided upon as a follow-up plan to address the patient's pain: steroid injections.      --- Follow-up for procedure

## 2025-03-27 ENCOUNTER — TRANSCRIBE ORDERS (OUTPATIENT)
Dept: SURGERY | Facility: SURGERY CENTER | Age: 75
End: 2025-03-27
Payer: MEDICARE

## 2025-03-27 DIAGNOSIS — Z41.9 SURGERY, ELECTIVE: Primary | ICD-10-CM

## 2025-03-29 DIAGNOSIS — M48.062 SPINAL STENOSIS OF LUMBAR REGION WITH NEUROGENIC CLAUDICATION: Primary | ICD-10-CM

## 2025-03-29 DIAGNOSIS — M54.50 LUMBAR PAIN: ICD-10-CM

## 2025-03-31 NOTE — TELEPHONE ENCOUNTER
Rx Refill Note  Requested Prescriptions     Pending Prescriptions Disp Refills    tiZANidine (ZANAFLEX) 4 MG tablet [Pharmacy Med Name: TIZANIDINE 4MG TABLETS] 30 tablet 0     Sig: TAKE 1 TABLET BY MOUTH EVERY 8 HOURS AS NEEDED FOR MUSCLE SPASMS      Last office visit with prescribing clinician: 2/12/2025   Last telemedicine visit with prescribing clinician: Visit date not found   Next office visit with prescribing clinician: Visit date not found                         Would you like a call back once the refill request has been completed: [] Yes [] No    If the office needs to give you a call back, can they leave a voicemail: [] Yes [] No    Roxane Pires MA  03/31/25, 10:33 EDT

## 2025-04-04 ENCOUNTER — HOSPITAL ENCOUNTER (OUTPATIENT)
Dept: CT IMAGING | Facility: HOSPITAL | Age: 75
Discharge: HOME | End: 2025-04-04
Payer: MEDICARE

## 2025-04-04 DIAGNOSIS — C83.00 SMALL LYMPHOCYTIC LYMPHOMA: ICD-10-CM

## 2025-04-04 LAB
ALBUMIN SERPL-MCNC: 3.8 G/DL (ref 3.5–5.2)
ALBUMIN/GLOB SERPL: 1.3 G/DL
ALP SERPL-CCNC: 134 U/L (ref 39–117)
ALT SERPL W P-5'-P-CCNC: 14 U/L (ref 1–33)
ANION GAP SERPL CALCULATED.3IONS-SCNC: 10.3 MMOL/L (ref 5–15)
AST SERPL-CCNC: 20 U/L (ref 1–32)
BASOPHILS # BLD AUTO: 0.03 10*3/MM3 (ref 0–0.2)
BASOPHILS NFR BLD AUTO: 0.6 % (ref 0–1.5)
BILIRUB SERPL-MCNC: 0.3 MG/DL (ref 0–1.2)
BUN SERPL-MCNC: 10 MG/DL (ref 8–23)
BUN/CREAT SERPL: 13.3 (ref 7–25)
CALCIUM SPEC-SCNC: 9.2 MG/DL (ref 8.6–10.5)
CHLORIDE SERPL-SCNC: 102 MMOL/L (ref 98–107)
CO2 SERPL-SCNC: 24.7 MMOL/L (ref 22–29)
CREAT SERPL-MCNC: 0.75 MG/DL (ref 0.57–1)
DEPRECATED RDW RBC AUTO: 44.2 FL (ref 37–54)
EGFRCR SERPLBLD CKD-EPI 2021: 83.1 ML/MIN/1.73
EOSINOPHIL # BLD AUTO: 0.23 10*3/MM3 (ref 0–0.4)
EOSINOPHIL NFR BLD AUTO: 4.4 % (ref 0.3–6.2)
ERYTHROCYTE [DISTWIDTH] IN BLOOD BY AUTOMATED COUNT: 14 % (ref 12.3–15.4)
GLOBULIN UR ELPH-MCNC: 2.9 GM/DL
GLUCOSE SERPL-MCNC: 97 MG/DL (ref 65–99)
HCT VFR BLD AUTO: 38.1 % (ref 34–46.6)
HGB BLD-MCNC: 12.3 G/DL (ref 12–15.9)
IMM GRANULOCYTES # BLD AUTO: 0.03 10*3/MM3 (ref 0–0.05)
IMM GRANULOCYTES NFR BLD AUTO: 0.6 % (ref 0–0.5)
LDH SERPL-CCNC: 233 U/L (ref 135–214)
LYMPHOCYTES # BLD AUTO: 2.08 10*3/MM3 (ref 0.7–3.1)
LYMPHOCYTES NFR BLD AUTO: 39.5 % (ref 19.6–45.3)
MCH RBC QN AUTO: 28 PG (ref 26.6–33)
MCHC RBC AUTO-ENTMCNC: 32.3 G/DL (ref 31.5–35.7)
MCV RBC AUTO: 86.8 FL (ref 79–97)
MONOCYTES # BLD AUTO: 0.89 10*3/MM3 (ref 0.1–0.9)
MONOCYTES NFR BLD AUTO: 16.9 % (ref 5–12)
NEUTROPHILS NFR BLD AUTO: 2 10*3/MM3 (ref 1.7–7)
NEUTROPHILS NFR BLD AUTO: 38 % (ref 42.7–76)
NRBC BLD AUTO-RTO: 0 /100 WBC (ref 0–0.2)
PLATELET # BLD AUTO: 250 10*3/MM3 (ref 140–450)
PMV BLD AUTO: 10.7 FL (ref 6–12)
POTASSIUM SERPL-SCNC: 4.2 MMOL/L (ref 3.5–5.2)
PROT SERPL-MCNC: 6.7 G/DL (ref 6–8.5)
RBC # BLD AUTO: 4.39 10*6/MM3 (ref 3.77–5.28)
SODIUM SERPL-SCNC: 137 MMOL/L (ref 136–145)
WBC NRBC COR # BLD AUTO: 5.26 10*3/MM3 (ref 3.4–10.8)

## 2025-04-04 PROCEDURE — 85025 COMPLETE CBC W/AUTO DIFF WBC: CPT | Performed by: INTERNAL MEDICINE

## 2025-04-04 PROCEDURE — 83615 LACTATE (LD) (LDH) ENZYME: CPT | Performed by: INTERNAL MEDICINE

## 2025-04-04 PROCEDURE — 25510000001 IOPAMIDOL 61 % SOLUTION: Performed by: INTERNAL MEDICINE

## 2025-04-04 PROCEDURE — 80053 COMPREHEN METABOLIC PANEL: CPT | Performed by: INTERNAL MEDICINE

## 2025-04-04 PROCEDURE — 70491 CT SOFT TISSUE NECK W/DYE: CPT

## 2025-04-04 PROCEDURE — 74177 CT ABD & PELVIS W/CONTRAST: CPT

## 2025-04-04 PROCEDURE — 71270 CT THORAX DX C-/C+: CPT

## 2025-04-04 RX ORDER — IOPAMIDOL 612 MG/ML
85 INJECTION, SOLUTION INTRAVASCULAR
Status: COMPLETED | OUTPATIENT
Start: 2025-04-04 | End: 2025-04-04

## 2025-04-04 RX ADMIN — IOPAMIDOL 85 ML: 612 INJECTION, SOLUTION INTRAVENOUS at 14:22

## 2025-04-07 NOTE — PROGRESS NOTES
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  In the interval, the patient has continued to deal with her chronic back pain and sciatica.  Since her surgery, her sciatica pain has improved significantly.  Her back does still cause her trouble in terms of pain and she is scheduled for another epidural on 4/22/2025.  She does note some continued night sweats which she has been having for quite some time, notes that overall these have improved with time and are tolerable.  She notes a normal appetite.  She remains active, ECOG performance status of 1.  She has not experienced any recent infectious issues.      Objective   Vital Signs:   /72   Pulse 71   Temp 98.1 °F (36.7 °C) (Oral)   Resp 17   Ht 154.9 cm (60.98\")   Wt 70.2 kg (154 lb 11.2 oz)   SpO2 96%   BMI 29.25 kg/m²     Physical Exam  Constitutional:       Appearance: She is well-developed.   Eyes:      Conjunctiva/sclera: Conjunctivae normal.   Neck:      Thyroid: No thyromegaly.      Comments: Submandibular lymph nodes 1 cm, symmetric and stable.  No palpable lymphadenopathy  Cardiovascular:      Rate and Rhythm: Normal rate and regular rhythm.      Heart sounds: No murmur heard.     No friction rub. No gallop.   Pulmonary:      Effort: No respiratory distress.      Breath sounds: Normal breath sounds.   Abdominal:      General: Bowel sounds are normal. There is no distension.      Palpations: Abdomen is soft.      Tenderness: There is no abdominal tenderness.   Lymphadenopathy:      Comments: No palpable axillary lymphadenopathy   Skin:     General: Skin is warm and dry.      Findings: No rash.   Neurological:      Mental Status: She is alert and oriented to person, place, and time.      Cranial Nerves: No cranial nerve deficit.      Motor: No abnormal muscle tone.      Deep " Tendon Reflexes: Reflexes normal.   Psychiatric:         Behavior: Behavior normal.            Result Review : Reviewed CBC, CMP, LDH from today.  Reviewed CT neck chest abdomen pelvis from 4/4/25.  Reviewed pain management records.      Assessment and Plan     *SLL  Patient reported a 3 to 4-month history of night sweats occurring most nights, often drenching in nature.  During this timeframe she also experienced increase in fatigue.  She did note normal appetite and stable weight.  She had remained active, ECOG performance status of 0.  The patient noted swelling in submandibular gland particularly on the right that was occurring over a 2 to 3-month period of time.    The patient was seen by Dr. Felder in ENT.  CT neck on 2/9/2024 which showed prominence of the palatine tonsils likely hyperplastic.  Vague heterogeneous slight increased enhancement in right gland right greater than left.  Submandibular space symmetric and normal appearance.  Pathologic lymph node left posterior cervical fat posterior to the left internal jugular vein measuring 1.5 x 1.3 x 2.1 cm (previous 1.0 x 0.8 x 1.4 cm on 4/2/2019).  Infrahyoid left posterior cervical triangle lymph node 1.2 x 0.9 x 1.7 cm and additional enlarged lymph node 1.4 x 1.0 x 1.7.  Ultrasound-guided biopsy of cervical lymph node on process 1/24.  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  Ki-67 estimated by pathology at [facility] at 18%.  Hematopathology review with flow cytometry that showed CD5 positive,  positive B-cell lymphoma, favor CLL/SLL.  Ki-67 on review by pathology less than 10% (within neoplastic areas).  Insufficient material for BCL1 FISH and CLL FISH panel.  Labs on 4/23/2024 with WBC normal at 6.28 with differential of 40 segs, 38 lymphs, 13 monocytes, 6 eosinophils with hemoglobin 13.7 and platelet count 207,000  Peripheral blood flow cytometry 4/23/2024 was negative, no evidence of peripheral blood involvement by SLL.  IGVH  therefore with no result  Beta-2 microglobulin on 4/23/2024 borderline elevated at 2.3  CT neck chest abdomen pelvis 4/26/2024 showed no change in small lymphadenopathy in the cervical region compared to 2/29/2024 with lymph nodes up to 1.5 cm in the left posterior cervical triangle and on the right up to 1.5 cm.  In the chest, small mediastinal and left hilar lymph nodes were stable to slightly decreased in size compared to prior CT scan 5/4/2022, maximum size 2.2 cm AP window, infracarinal 1.9 cm.  No adenopathy in the abdomen or pelvis, normal spleen.  Ultrasound-guided repeat biopsy left cervical lymph node on 4/30/2024 with pathology that again showed CD5 positive non-Hodgkin's B-cell lymphoma comprising 54% of cells with lambda light chain expression.  CLL/SLL FISH panel with trisomy 12.  Patient with low volume lymphadenopathy related to SLL involving cervical lymph nodes, suspect involvement in mediastinal and left hilar lymph nodes as well.  Patient appears to have indolent low-volume disease, low risk.  Unclear whether her night sweats/hot flashes are related to her disease given the limited lymphadenopathy and disease volume.  Recommended to pursue an initial course of observation/surveillance.    Patient was referred back to Dr. Felder in ENT who performed subsequent evaluation of the tonsils with no abnormal findings reported  CT neck chest abdomen pelvis on 8/30/2024 with no change in borderline submandibular, cervical, mediastinal lymph nodes.  Notation of increased bilateral pulmonary groundglass opacifications  High-resolution chest CT on 10/3/2024 with mention of mildly prominent left supraclavicular lymph nodes (9 mm), no mention of lymphadenopathy.  CT neck chest abdomen pelvis on 4/4/2025 with decrease in small left supraclavicular lymph node.  Remainder of small lymph nodes stable.  No new lymphadenopathy, normal spleen.  Patient returns today in follow-up with no clinical evidence of  progression of CLL/SLL.  She had no palpable lymphadenopathy on exam.  We reviewed her CT scan from 4/4/2025 as noted above.  This did show a decrease in size of the small lymph node in the left supraclavicular region, additional small lymph nodes stable.  There is no evidence of progressive disease therefore, no indication for treatment.  She appears to have very indolent disease at this point.  We discussed deferring further follow-up CT scans for at least a year if not more.  For now we will continue to monitor her every 4 months clinically with labs and exam.  LDH does remain borderline elevated at 233 of unclear significance and she does continue with intermittent night sweats however it appears that this may not be related to her underlying SLL.  She reports that over time the night sweats have diminished and are at this point tolerable.    *History of gallbladder cancer in 2012 stage IIA ([id number])  The patient had previous gallbladder cancer in 2012.    Surgical resection with cholecystectomy on 1/9/2012 with Dr. Tavera.    Pathology showed invasive papillary adenocarcinoma involving body of gallbladder contralateral to liver measuring 4.5 x 3.0 x 1.5 cm, moderately differentiated, invading into muscular layer and extending to perimuscular connective tissue.  Negative serosal and cystic duct margins.  No angiolymphatic invasion nor perineural invasion.  Additional finding of high-grade dysplasia in adenomatous polyp and chronic cholecystitis.  1 portal lymph node negative for involvement, pT2a, N0 stage IIA.    Patient received adjuvant therapy under the direction of Dr. Lanier in medical oncology and Dr. Kraft in radiation oncology at [facility].    She received adjuvant radiation with concurrent 5-FU and subsequent cisplatin and Gemzar chemotherapy.  Patient completed treatment in July 2012.  She has had no evidence of recurrent disease.    *History of meningioma  Patient has been monitored  by neurosurgery for a meningioma  Most recent MRI brain 11/2/2021 with stable findings of densely calcified 1.9 cm meningioma involving the falx medial to right occipital lobe leading to mild to moderate narrowing of superior sagittal sinus.    Follow-up planned at 10-year interval.    *History of TIA  Patient with history of TIA in 2019  She continues on aspirin 81 mg daily    *GERD, history of GI bleed from duodenal ulcer  Previous GI bleed related to duodenal ulcer in 2018 that was NSAID induced.  Patient continues on Prilosec 40 mg daily    *Hypothyroidism  Patient continues on levothyroxine 100 mcg daily  With patient's ongoing hot flashes/night sweats, labs obtained on 5/7/2024 TSH 0.337, free T4 borderline elevated at 1.73.  Patient was asked to follow-up with Dr. Carter.  Repeat thyroid studies on 7/2/2024 with normal findings, TSH 0.322 and free T4 of 1.61    *Pulmonary groundglass opacities  Patient with prior CT scans noting some minor pulmonary parenchymal abnormalities  CT chest on 8/30/2024 with increase in bilateral diffuse groundglass opacities  Patient was asymptomatic from a pulmonary standpoint.   Patient was seen by by Dr. Carias in pulmonary on 9/19/2024.    Patient had evidence of restrictive lung disease by PFTs  Fungal serologies 9/20/2024 were negative.    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    There was discussion regarding potential use of steroids however ultimately this was not pursued.   CT chest (resolution) on 4/4/2025 showed stable pulmonary findings with notation that patient does not require ongoing high-resolution CT scans for evaluation.  Patient today remains asymptomatic from a pulmonary standpoint.  Reviewed her recent CT scan from 4/4/25 showing stable findings.  She will follow-up with pulmonary as  scheduled.    *Lumbar spinal stenosis and disc disease  Patient experienced a fall 11/1/2024 with exacerbation of back pain.  On 12/17/2024, patient underwent surgery with Dr. Terrazas with decompression L4/5 and L5/S1 along with discectomy L5/S1  MRI lumbar spine on 1/30/2025 showed evidence of prior surgery.  Mild thickening and enhancement of nerve roots, arachnoiditis could not be excluded.  Granulation tissue present.  Degenerative disease noted with moderate canal stenosis at L3/4, retrolisthesis  Patient continues with chronic back pain.  Her sciatica has resolved since her surgery.  She is scheduled for repeat epidural injection on 4/22/2025    Plan:  Patient continues on a course of observation/surveillance regarding SLL.  Patient has no palpable lymphadenopathy on exam.  No evidence of disease progression nor indication for treatment.  Patient continues follow-up with pain management due for epidural injection on 4/22/2025  Patient will continue follow-up with pulmonary  MD visit in 4 months with CBC, CMP, LDH.

## 2025-04-09 DIAGNOSIS — M54.16 LUMBAR RADICULOPATHY: ICD-10-CM

## 2025-04-10 RX ORDER — GABAPENTIN 300 MG/1
300 CAPSULE ORAL 3 TIMES DAILY
Qty: 90 CAPSULE | Refills: 0 | Status: SHIPPED | OUTPATIENT
Start: 2025-04-10

## 2025-04-11 ENCOUNTER — APPOINTMENT (OUTPATIENT)
Dept: GENERAL RADIOLOGY | Facility: HOSPITAL | Age: 75
End: 2025-04-11
Payer: MEDICARE

## 2025-04-11 ENCOUNTER — HOSPITAL ENCOUNTER (EMERGENCY)
Facility: HOSPITAL | Age: 75
Discharge: HOME | End: 2025-04-11
Attending: EMERGENCY MEDICINE
Payer: MEDICARE

## 2025-04-11 ENCOUNTER — APPOINTMENT (OUTPATIENT)
Dept: CT IMAGING | Facility: HOSPITAL | Age: 75
End: 2025-04-11
Payer: MEDICARE

## 2025-04-11 ENCOUNTER — OFFICE VISIT (OUTPATIENT)
Dept: ONCOLOGY | Facility: CLINIC | Age: 75
End: 2025-04-11
Payer: MEDICARE

## 2025-04-11 VITALS
SYSTOLIC BLOOD PRESSURE: 119 MMHG | WEIGHT: 154.7 LBS | DIASTOLIC BLOOD PRESSURE: 72 MMHG | BODY MASS INDEX: 29.21 KG/M2 | TEMPERATURE: 98.1 F | HEIGHT: 61 IN | OXYGEN SATURATION: 96 % | HEART RATE: 71 BPM | RESPIRATION RATE: 17 BRPM

## 2025-04-11 VITALS
RESPIRATION RATE: 18 BRPM | DIASTOLIC BLOOD PRESSURE: 89 MMHG | HEART RATE: 84 BPM | TEMPERATURE: 97.7 F | OXYGEN SATURATION: 98 % | BODY MASS INDEX: 29.22 KG/M2 | SYSTOLIC BLOOD PRESSURE: 110 MMHG | WEIGHT: 154.76 LBS | HEIGHT: 61 IN

## 2025-04-11 DIAGNOSIS — S01.81XA FACIAL LACERATION, INITIAL ENCOUNTER: ICD-10-CM

## 2025-04-11 DIAGNOSIS — S60.221A CONTUSION OF RIGHT HAND, INITIAL ENCOUNTER: ICD-10-CM

## 2025-04-11 DIAGNOSIS — C83.00 SMALL LYMPHOCYTIC LYMPHOMA: Primary | ICD-10-CM

## 2025-04-11 DIAGNOSIS — S80.02XA CONTUSION OF LEFT KNEE, INITIAL ENCOUNTER: ICD-10-CM

## 2025-04-11 DIAGNOSIS — S09.90XA CHI (CLOSED HEAD INJURY), INITIAL ENCOUNTER: Primary | ICD-10-CM

## 2025-04-11 PROCEDURE — 70450 CT HEAD/BRAIN W/O DYE: CPT

## 2025-04-11 PROCEDURE — 72125 CT NECK SPINE W/O DYE: CPT

## 2025-04-11 PROCEDURE — 90715 TDAP VACCINE 7 YRS/> IM: CPT | Performed by: PHYSICIAN ASSISTANT

## 2025-04-11 PROCEDURE — 99284 EMERGENCY DEPT VISIT MOD MDM: CPT

## 2025-04-11 PROCEDURE — 73560 X-RAY EXAM OF KNEE 1 OR 2: CPT

## 2025-04-11 PROCEDURE — 25010000002 TETANUS-DIPHTH-ACELL PERTUSSIS 5-2.5-18.5 LF-MCG/0.5 SUSPENSION PREFILLED SYRINGE: Performed by: PHYSICIAN ASSISTANT

## 2025-04-11 PROCEDURE — 73130 X-RAY EXAM OF HAND: CPT

## 2025-04-11 PROCEDURE — 90471 IMMUNIZATION ADMIN: CPT | Performed by: PHYSICIAN ASSISTANT

## 2025-04-11 RX ORDER — HYDROCODONE BITARTRATE AND ACETAMINOPHEN 5; 325 MG/1; MG/1
1 TABLET ORAL ONCE
Refills: 0 | Status: COMPLETED | OUTPATIENT
Start: 2025-04-11 | End: 2025-04-11

## 2025-04-11 RX ADMIN — HYDROCODONE BITARTRATE AND ACETAMINOPHEN 1 TABLET: 5; 325 TABLET ORAL at 22:07

## 2025-04-11 RX ADMIN — HYDROCODONE BITARTRATE AND ACETAMINOPHEN 1 TABLET: 5; 325 TABLET ORAL at 18:50

## 2025-04-11 RX ADMIN — TETANUS TOXOID, REDUCED DIPHTHERIA TOXOID AND ACELLULAR PERTUSSIS VACCINE, ADSORBED 0.5 ML: 5; 2.5; 8; 8; 2.5 SUSPENSION INTRAMUSCULAR at 18:52

## 2025-04-11 NOTE — LETTER
"April 12, 2025     Chang Carter MD  [address]  [place]  [facility] [address]    Patient: Imani Shankar   YOB: 1950   Date of Visit: 4/11/2025     Dear Chang Carter MD:       Thank you for referring Imani Shankar to me for evaluation. Below are the relevant portions of my assessment and plan of care.    If you have questions, please do not hesitate to call me. I look forward to following Imani along with you.         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  In the interval, the patient has continued to deal with her chronic back pain and sciatica.  Since her surgery, her sciatica pain has improved significantly.  Her back does still cause her trouble in terms of pain and she is scheduled for another epidural on 4/22/2025.  She does note some continued night sweats which she has been having for quite some time, notes that overall these have improved with time and are tolerable.  She notes a normal appetite.  She remains active, ECOG performance status of 1.  She has not experienced any recent infectious issues.      Objective  Vital Signs:   /72   Pulse 71   Temp 98.1 °F (36.7 °C) (Oral)   Resp 17   Ht 154.9 cm (60.98\")   Wt 70.2 kg (154 lb 11.2 oz)   SpO2 96%   BMI 29.25 kg/m²     Physical Exam  Constitutional:       Appearance: She is well-developed.   Eyes:      Conjunctiva/sclera: Conjunctivae normal.   Neck:      Thyroid: No thyromegaly.      Comments: Submandibular lymph nodes 1 cm, symmetric and stable.  No palpable lymphadenopathy  Cardiovascular:      Rate and Rhythm: Normal rate and regular rhythm.      Heart sounds: No murmur heard.     No friction rub. No gallop. "   Pulmonary:      Effort: No respiratory distress.      Breath sounds: Normal breath sounds.   Abdominal:      General: Bowel sounds are normal. There is no distension.      Palpations: Abdomen is soft.      Tenderness: There is no abdominal tenderness.   Lymphadenopathy:      Comments: No palpable axillary lymphadenopathy   Skin:     General: Skin is warm and dry.      Findings: No rash.   Neurological:      Mental Status: She is alert and oriented to person, place, and time.      Cranial Nerves: No cranial nerve deficit.      Motor: No abnormal muscle tone.      Deep Tendon Reflexes: Reflexes normal.   Psychiatric:         Behavior: Behavior normal.            Result Review: Reviewed CBC, CMP, LDH from today.  Reviewed CT neck chest abdomen pelvis from 4/4/25.  Reviewed pain management records.      Assessment and Plan     *SLL  Patient reported a 3 to 4-month history of night sweats occurring most nights, often drenching in nature.  During this timeframe she also experienced increase in fatigue.  She did note normal appetite and stable weight.  She had remained active, ECOG performance status of 0.  The patient noted swelling in submandibular gland particularly on the right that was occurring over a 2 to 3-month period of time.    The patient was seen by Dr. Felder in ENT.  CT neck on 2/9/2024 which showed prominence of the palatine tonsils likely hyperplastic.  Vague heterogeneous slight increased enhancement in right gland right greater than left.  Submandibular space symmetric and normal appearance.  Pathologic lymph node left posterior cervical fat posterior to the left internal jugular vein measuring 1.5 x 1.3 x 2.1 cm (previous 1.0 x 0.8 x 1.4 cm on 4/2/2019).  Infrahyoid left posterior cervical triangle lymph node 1.2 x 0.9 x 1.7 cm and additional enlarged lymph node 1.4 x 1.0 x 1.7.  Ultrasound-guided biopsy of cervical lymph node on process 1/24.  Pathology with final diagnosis of small lymphocytic  lymphoma.  Ki-67 estimated by pathology at [facility] at 18%.  Hematopathology review with flow cytometry that showed CD5 positive,  positive B-cell lymphoma, favor CLL/SLL.  Ki-67 on review by pathology less than 10% (within neoplastic areas).  Insufficient material for BCL1 FISH and CLL FISH panel.  Labs on 4/23/2024 with WBC normal at 6.28 with differential of 40 segs, 38 lymphs, 13 monocytes, 6 eosinophils with hemoglobin 13.7 and platelet count 207,000  Peripheral blood flow cytometry 4/23/2024 was negative, no evidence of peripheral blood involvement by SLL.  IGVH therefore with no result  Beta-2 microglobulin on 4/23/2024 borderline elevated at 2.3  CT neck chest abdomen pelvis 4/26/2024 showed no change in small lymphadenopathy in the cervical region compared to 2/29/2024 with lymph nodes up to 1.5 cm in the left posterior cervical triangle and on the right up to 1.5 cm.  In the chest, small mediastinal and left hilar lymph nodes were stable to slightly decreased in size compared to prior CT scan 5/4/2022, maximum size 2.2 cm AP window, infracarinal 1.9 cm.  No adenopathy in the abdomen or pelvis, normal spleen.  Ultrasound-guided repeat biopsy left cervical lymph node on 4/30/2024 with pathology that again showed CD5 positive non-Hodgkin's B-cell lymphoma comprising 54% of cells with lambda light chain expression.  CLL/SLL FISH panel with trisomy 12.  Patient with low volume lymphadenopathy related to SLL involving cervical lymph nodes, suspect involvement in mediastinal and left hilar lymph nodes as well.  Patient appears to have indolent low-volume disease, low risk.  Unclear whether her night sweats/hot flashes are related to her disease given the limited lymphadenopathy and disease volume.  Recommended to pursue an initial course of observation/surveillance.    Patient was referred back to Dr. Felder in ENT who performed subsequent evaluation of the tonsils with no abnormal findings reported  CT neck  chest abdomen pelvis on 8/30/2024 with no change in borderline submandibular, cervical, mediastinal lymph nodes.  Notation of increased bilateral pulmonary groundglass opacifications  High-resolution chest CT on 10/3/2024 with mention of mildly prominent left supraclavicular lymph nodes (9 mm), no mention of lymphadenopathy.  CT neck chest abdomen pelvis on 4/4/2025 with decrease in small left supraclavicular lymph node.  Remainder of small lymph nodes stable.  No new lymphadenopathy, normal spleen.  Patient returns today in follow-up with no clinical evidence of progression of CLL/SLL.  She had no palpable lymphadenopathy on exam.  We reviewed her CT scan from 4/4/2025 as noted above.  This did show a decrease in size of the small lymph node in the left supraclavicular region, additional small lymph nodes stable.  There is no evidence of progressive disease therefore, no indication for treatment.  She appears to have very indolent disease at this point.  We discussed deferring further follow-up CT scans for at least a year if not more.  For now we will continue to monitor her every 4 months clinically with labs and exam.  LDH does remain borderline elevated at 233 of unclear significance and she does continue with intermittent night sweats however it appears that this may not be related to her underlying SLL.  She reports that over time the night sweats have diminished and are at this point tolerable.    *History of gallbladder cancer in 2012 stage IIA ([id number])  The patient had previous gallbladder cancer in 2012.    Surgical resection with cholecystectomy on 1/9/2012 with Dr. Tavera.    Pathology showed invasive papillary adenocarcinoma involving body of gallbladder contralateral to liver measuring 4.5 x 3.0 x 1.5 cm, moderately differentiated, invading into muscular layer and extending to perimuscular connective tissue.  Negative serosal and cystic duct margins.  No angiolymphatic invasion nor perineural  invasion.  Additional finding of high-grade dysplasia in adenomatous polyp and chronic cholecystitis.  1 portal lymph node negative for involvement, pT2a, N0 stage IIA.    Patient received adjuvant therapy under the direction of Dr. Lanier in medical oncology and Dr. Kraft in radiation oncology at [facility].    She received adjuvant radiation with concurrent 5-FU and subsequent cisplatin and Gemzar chemotherapy.  Patient completed treatment in July 2012.  She has had no evidence of recurrent disease.    *History of meningioma  Patient has been monitored by neurosurgery for a meningioma  Most recent MRI brain 11/2/2021 with stable findings of densely calcified 1.9 cm meningioma involving the falx medial to right occipital lobe leading to mild to moderate narrowing of superior sagittal sinus.    Follow-up planned at 10-year interval.    *History of TIA  Patient with history of TIA in 2019  She continues on aspirin 81 mg daily    *GERD, history of GI bleed from duodenal ulcer  Previous GI bleed related to duodenal ulcer in 2018 that was NSAID induced.  Patient continues on Prilosec 40 mg daily    *Hypothyroidism  Patient continues on levothyroxine 100 mcg daily  With patient's ongoing hot flashes/night sweats, labs obtained on 5/7/2024 TSH 0.337, free T4 borderline elevated at 1.73.  Patient was asked to follow-up with Dr. Carter.  Repeat thyroid studies on 7/2/2024 with normal findings, TSH 0.322 and free T4 of 1.61    *Pulmonary groundglass opacities  Patient with prior CT scans noting some minor pulmonary parenchymal abnormalities  CT chest on 8/30/2024 with increase in bilateral diffuse groundglass opacities  Patient was asymptomatic from a pulmonary standpoint.   Patient was seen by by Dr. Carias in pulmonary on 9/19/2024.    Patient had evidence of restrictive lung disease by PFTs  Fungal serologies 9/20/2024 were negative.    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    There was discussion regarding potential use of steroids however ultimately this was not pursued.   CT chest (resolution) on 4/4/2025 showed stable pulmonary findings with notation that patient does not require ongoing high-resolution CT scans for evaluation.  Patient today remains asymptomatic from a pulmonary standpoint.  Reviewed her recent CT scan from 4/4/25 showing stable findings.  She will follow-up with pulmonary as scheduled.    *Lumbar spinal stenosis and disc disease  Patient experienced a fall 11/1/2024 with exacerbation of back pain.  On 12/17/2024, patient underwent surgery with Dr. Terrazas with decompression L4/5 and L5/S1 along with discectomy L5/S1  MRI lumbar spine on 1/30/2025 showed evidence of prior surgery.  Mild thickening and enhancement of nerve roots, arachnoiditis could not be excluded.  Granulation tissue present.  Degenerative disease noted with moderate canal stenosis at L3/4, retrolisthesis  Patient continues with chronic back pain.  Her sciatica has resolved since her surgery.  She is scheduled for repeat epidural injection on 4/22/2025    Plan:  Patient continues on a course of observation/surveillance regarding SLL.  Patient has no palpable lymphadenopathy on exam.  No evidence of disease progression nor indication for treatment.  Patient continues follow-up with pain management due for epidural injection on 4/22/2025  Patient will continue follow-up with pulmonary  MD visit in 4 months with CBC, CMP, LDH.

## 2025-04-11 NOTE — ED PROVIDER NOTES
EMERGENCY DEPARTMENT ENCOUNTER      Room Number:  S01/01  PCP: Chang Carter MD  Independent Historians: Patient and Family  Patient Care Team:  Chang Carter MD as PCP - General (Family Medicine)  Ana Meade MD as Gynecologist (Gynecology)  Jerry Cleveland Jr., MD as Consulting Physician (Hematology and Oncology)  Donnell Felder MD as Referring Physician (Otolaryngology)       HPI:  Chief Complaint: CHI    A complete HPI/ROS/PMH/PSH/SH/FH are unobtainable due to: None    Chronic or social conditions impacting patient care (Social Determinants of Health): None      Context: Imani Shankar is a 74 y.o. female with a PMH significant for chronic pain, lumbar radiculopathy, lumbar spinal stenosis who presents to the ED c/o acute closed head injury and facial injury.  The patient reports that she was walking her dog today and stepped awkwardly causing a fall.  She fell forward and struck her face on the ground at that time.  She sustained a wound to the right sided forehead.  Denies LOC, numbness or weakness of the face or extremities, slurred speech, visual disturbance, photophobia, nausea, vomiting, seizure-like activity, amnesia to the event.  There is no neck pain.  She has not had any medications prior to arrival to alleviate symptoms.  Her only other concern is for a mild pain to the anterior aspect of the left knee where there is a superficial abrasion.      Upon review of prior external notes (non-ED) -and- Review of prior external test results outside of this encounter it appears the patient was evaluated in the office with pain management for lumbar radiculopathy on 3/26/2025.  The patient had a normal BNP and troponin on 12/20/2024.      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    Chronic night sweats 05/07/2024    Back pain 12/03/2024    Lumbar radiculopathy 12/10/2024    Bilateral leg weakness 12/10/2024    Spinal stenosis of lumbar region with neurogenic claudication 12/11/2024    Herniation of lumbar intervertebral disc with radiculopathy 12/11/2024    Degeneration of intervertebral disc of lumbar region with discogenic back pain and lower extremity pain 12/11/2024    Calf muscle weakness 12/11/2024    Spinal stenosis of lumbar region 12/17/2024    Status post lumbar spine surgery for decompression of spinal cord 03/03/2025    Bilateral lumbar radiculopathy 03/03/2025    Spinal stenosis of lumbar region with radiculopathy 03/03/2025    Sacroiliac joint dysfunction of both sides 03/26/2025     Resolved Ambulatory Problems     Diagnosis Date Noted    Acute gastric ulcer with hemorrhage 12/21/2018    Encounter for screening for malignant neoplasm of colon 10/13/2022     Past Medical History:   Diagnosis Date    Arthritis     Bleeding ulcer 2018    Cancer 01/2012    Chronic low back pain     Disease of thyroid gland     Duodenal ulcer     Extremity pain Left leg pain    GERD (gastroesophageal reflux disease)     GI (gastrointestinal bleed) 02/02/2018    High cholesterol     History of chemotherapy     History of radiation therapy     History of transfusion     Hypertension     Leg weakness, bilateral     Lumbosacral disc disease December 2024    Peripheral neuropathy     Spinal stenosis          PAST SURGICAL HISTORY  Past Surgical History:   Procedure Laterality Date    APPENDECTOMY      BACK SURGERY  12/172024    CATARACT EXTRACTION      CHOLECYSTECTOMY  01/2012    for gall bladder cancer, removed by Dr. Kendy Martinez    COLONOSCOPY  approx 2013     Too YUEN,  normal per patient    COLONOSCOPY N/A 11/29/2022    Procedure: COLONOSCOPY to cecum and TI:  bx polyps, cold snare polyp;  Surgeon: Janet Hamilton MD;  Location:  [facility];  Service: Gastroenterology;  Laterality: N/A;  pre:  screening  post:  polyps, divertidulosis, hemorrhoids    ENDOSCOPY N/A 04/04/2019    Procedure: ESOPHAGOGASTRODUODENOSCOPY with biopsy;  Surgeon: Ruby Ferrari MD;  Location:  [facility];  Service: Gastroenterology    EPIDURAL BLOCK      LAMINECTOMY  12/17/2024    LUMBAR LAMINECTOMY DISCECTOMY DECOMPRESSION N/A 12/17/2024    Procedure: Open bilateral lumbar decompression/discectomy lumbar four/five, lumbar five/sacral one;  Surgeon: Franklyn Terrazas MD;  Location:  [facility];  Service: Neurosurgery;  Laterality: N/A;    SHOULDER ARTHROSCOPY W/ ROTATOR CUFF REPAIR Right 09/28/2017    Procedure: RT SHOULDER ARTHROSCOPY ACROMIOPLASTY ROTATOR CUFF REPAIR AND LABRAL DEBRIDEMENT;  Surgeon: Nicho Hill MD;  Location:  [facility];  Service:     UPPER GASTROINTESTINAL ENDOSCOPY  02/02/2018    [facility]    multipal duodenal ulcers in D1-D2 area, no active bleed    UPPER GASTROINTESTINAL ENDOSCOPY  01/31/2018    [facility]   oozing blood of small ulcer in D1-D2 region, clot overlying ulcer, injected at base, endo clips placed    UPPER GASTROINTESTINAL ENDOSCOPY  03/14/2018    [facility]   previous ulcer almost healed    US GUIDED LYMPH NODE BIOPSY  05/02/2024         FAMILY HISTORY  Family History   Problem Relation Age of Onset    Ulcers Mother     Alcohol abuse Mother         Passed away    Kidney failure Father     Hypertension Father     Hypertension Brother     No Known Problems Son     No Known Problems Son     Breast cancer Neg Hx     Ovarian cancer Neg Hx     Uterine cancer Neg Hx     Colon cancer Neg Hx     Malig Hyperthermia Neg Hx          SOCIAL HISTORY  Social History     Socioeconomic History    Marital status:       Spouse name: TRACEY    Number of children: 4   Tobacco Use    Smoking status: Never     Passive exposure: Never    Smokeless tobacco: Never   Vaping Use    Vaping status: Never Used   Substance and Sexual Activity    Alcohol use: No    Drug use: No    Sexual activity: Not Currently     Partners: Male     Birth control/protection: Post-menopausal, Tubal ligation     Comment: spouse = TRACEY          ALLERGIES  Patient has no known allergies.      REVIEW OF SYSTEMS  Included in HPI  All systems reviewed and negative except for those discussed in HPI.      PHYSICAL EXAM    I have reviewed the triage vital signs and nursing notes.    ED Triage Vitals   Temp Heart Rate Resp BP SpO2   04/11/25 1801 04/11/25 1801 04/11/25 1801 04/11/25 1806 04/11/25 1801   97.7 °F (36.5 °C) 84 18 168/98 98 %      Temp src Heart Rate Source Patient Position BP Location FiO2 (%)   -- -- -- -- --              Physical Exam  Constitutional:       General: She is not in acute distress.     Appearance: She is well-developed.   HENT:      Head: Normocephalic.     Eyes:      General: No scleral icterus.     Conjunctiva/sclera: Conjunctivae normal.   Neck:      Trachea: No tracheal deviation.   Cardiovascular:      Rate and Rhythm: Normal rate and regular rhythm.   Pulmonary:      Effort: Pulmonary effort is normal.      Breath sounds: Normal breath sounds.   Abdominal:      Palpations: Abdomen is soft.      Tenderness: There is no abdominal tenderness.   Musculoskeletal:         General: No deformity.      Cervical back: Normal range of motion.   Lymphadenopathy:      Cervical: No cervical adenopathy.   Skin:     General: Skin is warm and dry.   Neurological:      Mental Status: She is alert and oriented to person, place, and time.      Sensory: Sensation is intact.      Motor: Motor function is intact.   Psychiatric:         Behavior: Behavior normal.         Vital signs and nursing notes reviewed.      PPE: I wore a surgical mask  throughout my encounters with the pt. I performed hand hygiene on entry into the pt room and upon exit.     LAB RESULTS  No results found for this or any previous visit (from the past 24 hours).      RADIOLOGY  XR Knee 1 or 2 View Left  Result Date: 4/11/2025  XR KNEE 1 OR 2 VW LEFT-  HISTORY: 74-year-old female with left anterior knee pain. Abrasion following a fall.  FINDINGS: There is no fracture, malalignment, or joint effusion. There are moderately advanced osteoarthritic changes at the medial tibiofemoral compartment.  This report was finalized on 4/11/2025 7:09 PM by Dr. Eve Stark M.D on Workstation: [id number]      CT Cervical Spine Without Contrast  CT Cervical Spine Without Contrast, CT Head Without Contrast  Result Date: 4/11/2025  CT HEAD WO CONTRAST-, CT CERVICAL SPINE WO CONTRAST-  DATE OF EXAM: 4/11/2025 6:31 PM  INDICATION: Facial injury and closed head injury after fall from standing. Hit head on concrete. Right forehead laceration.  COMPARISON: CT neck 4/4/2025 and 8/30/2024. CT head 11/1/2024 and 5/26/2017. MRI brain 11/2/2021.  TECHNIQUE: Multiple contiguous axial images were acquired through the head and cervical spine without the intravenous administration of contrast. Reformatted coronal and sagittal sequences were also reviewed. Radiation dose reduction techniques were utilized, including automated exposure control and exposure modulation based on body size.  FINDINGS: CT HEAD: Stable extra-axial calcified probable meningioma along the right parafalcine posterior right parietal lobe and superior right occipital lobe. No acute intracranial hemorrhage or mass affect. The ventricles and sulci are stable and appropriate for age. The basal cisterns are patent. Normal gray-white matter differentiation is grossly maintained. Intracranial vascular calcifications, consistent with atherosclerotic disease. Evidence of bilateral cataract surgery. Limited imaging of the paranasal sinuses and mastoid  air cells unremarkable. Mild hyperostosis frontalis interna. Extracranial right frontal scalp and right supraorbital soft tissue injury with soft tissue swelling no acute osseous abnormality is identified.  CT CERVICAL SPINE: Mild reversal of the normal cervical lordosis could be related to patient positioning and/or degenerative change. Chronic/degenerative grade 1 anterolisthesis of C3 on C4. Multilevel degenerative endplate changes. Osseous fusion of the C2 and C3 vertebral segments. No evidence of acute fracture of the cervical spine. Severe arthropathic changes at the anterior median atlantoaxial joint with partial calcification of the thickened transverse ligament of the dens, suggesting CPPD.  C2-C3: No significant spinal canal stenosis or neural foraminal narrowing. C3-C4: Grade 1 anterolisthesis with a broad-based posterior disc osteophyte complex and bilateral facet and uncinate process hypertrophy. Findings result in mild to moderate spinal canal stenosis (AP canal diameter 8 mm) and severe bilateral neural foraminal narrowing. C4-C5: Broad-based posterior disc osteophyte complex with bilateral facet and uncinate process hypertrophy. Findings result in moderate to severe spinal canal stenosis (AP canal diameter 6 to 7 mm) and moderate to severe bilateral neural foraminal narrowing. C5-C6: Broad-based posterior disc osteophyte complex with bilateral uncinate process hypertrophy and left facet hypertrophy. Findings result in moderate to severe spinal canal stenosis (AP canal diameter 6 to 7 mm), severe left neural foraminal narrowing, and moderate right neural foraminal narrowing. C6-C7: Broad-based posterior disc osteophyte complex with bilateral uncinate process hypertrophy. Findings result in mild to moderate spinal canal stenosis (AP canal diameter 8 mm) and moderate left neural foraminal narrowing. No significant right neural foraminal narrowing. C7-T1: Left hypertrophy, without significant spinal  canal stenosis or neural foraminal narrowing. T1-T2: Broad-based posterior disc osteophyte complex with bilateral uncinate process hypertrophy. Findings result in mild indentation of the thecal sac, severe right neural foraminal narrowing, and mild left neural foraminal narrowing.  The paraspinal soft tissues are unremarkable. Partial ossification of the nuchal ligament. Calcified atherosclerotic disease in the thoracic aorta. Similar-appearing pathologically enlarged cervical lymph nodes with an index left level 2-3 cervical lymph node measuring 1.3 cm x 1 cm (axial series 6 image 61). Partially imaged mosaic pattern groundglass opacities in the lung apices.       1. Extracranial right frontal scalp and right supraorbital soft tissue injury with mild soft tissue swelling but no acute intracranial hemorrhage or mass effect. 2. No acute fracture of the cervical spine. 3. Stable calcified meningioma in the right parafalcine posterior parietal lobe and superior right occipital lobe. 4. Multilevel cervical spondylosis, as above. 5. Redemonstration of cervical lymphadenopathy.  This report was finalized on 4/11/2025 7:05 PM by Parag Valera MD on Workstation: [id number]          MEDICATIONS GIVEN IN ER  Medications   Tetanus-Diphth-Acell Pertussis (BOOSTRIX) injection 0.5 mL (0.5 mL Intramuscular Given 4/11/25 1852)   HYDROcodone-acetaminophen (NORCO) 5-325 MG per tablet 1 tablet (1 tablet Oral Given 4/11/25 1850)         ORDERS PLACED DURING THIS VISIT:  Orders Placed This Encounter   Procedures    Laceration Repair    Laceration Repair    CT Cervical Spine Without Contrast    CT Head Without Contrast    XR Knee 1 or 2 View Left    XR Hand 3+ View Right         OUTPATIENT MEDICATION MANAGEMENT:  No current Epic-ordered facility-administered medications on file.     Current Outpatient Medications Ordered in Epic   Medication Sig Dispense Refill    acetaminophen (TYLENOL) 325 MG tablet Take 2 tablets by mouth Every 4  (Four) Hours As Needed for Mild Pain.      aspirin 81 MG chewable tablet Chew 1 tablet Daily.      gabapentin (NEURONTIN) 300 MG capsule TAKE 1 CAPSULE BY MOUTH THREE TIMES DAILY 90 capsule 0    levothyroxine (SYNTHROID, LEVOTHROID) 100 MCG tablet Take 1 tablet by mouth Daily. 90 tablet 0    loratadine (CLARITIN) 10 MG tablet Take 1 tablet by mouth Daily As Needed.      metoprolol succinate XL (TOPROL-XL) 50 MG 24 hr tablet Take 1 tablet by mouth Daily.      multivitamin with minerals (MULTIVITAMIN ADULT PO) Take 1 tablet by mouth Daily.      omeprazole (priLOSEC) 40 MG capsule Take 1 capsule by mouth Daily.      pravastatin (PRAVACHOL) 40 MG tablet TAKE 1 TABLET BY MOUTH DAILY (Patient taking differently: Take 1 tablet by mouth Every Night.) 90 tablet 3    tiZANidine (ZANAFLEX) 4 MG tablet TAKE 1 TABLET BY MOUTH EVERY 8 HOURS AS NEEDED FOR MUSCLE SPASMS 90 tablet 5    traMADol (ULTRAM) 50 MG tablet Take 1 tablet by mouth Every 6 (Six) Hours As Needed for Moderate Pain.           PROCEDURES  Laceration Repair    Date/Time: 4/11/2025 7:45 PM    Performed by: Boo Mcneil PA  Authorized by: Narciso Newton MD    Consent:     Consent obtained:  Verbal    Consent given by:  Patient    Risks, benefits, and alternatives were discussed: yes      Risks discussed:  Infection, pain and poor cosmetic result    Alternatives discussed:  No treatment  Universal protocol:     Patient identity confirmed:  Verbally with patient and arm band  Laceration details:     Location:  Face    Face location:  Forehead    Length (cm):  3.2  Pre-procedure details:     Preparation:  Patient was prepped and draped in usual sterile fashion  Exploration:     Hemostasis achieved with:  Direct pressure    Wound exploration: entire depth of wound visualized      Contaminated: no    Treatment:     Area cleansed with:  Chlorhexidine    Amount of cleaning:  Extensive    Irrigation solution:  Sterile saline    Visualized foreign  bodies/material removed: no    Skin repair:     Repair method:  Sutures    Suture size:  5-0    Suture material:  Nylon    Suture technique:  Simple interrupted    Number of sutures:  9  Approximation:     Approximation:  Close  Repair type:     Repair type:  Complex  Post-procedure details:     Procedure completion:  Tolerated well, no immediate complications  Laceration Repair    Date/Time: 4/11/2025 7:45 PM    Performed by: Boo Mcneil PA  Authorized by: Narciso Newton MD    Consent:     Consent obtained:  Verbal    Consent given by:  Patient    Risks, benefits, and alternatives were discussed: yes      Risks discussed:  Infection, pain and poor cosmetic result    Alternatives discussed:  No treatment  Universal protocol:     Patient identity confirmed:  Verbally with patient and arm band  Laceration details:     Location:  Face    Face location:  Forehead    Length (cm):  8.2  Pre-procedure details:     Preparation:  Patient was prepped and draped in usual sterile fashion  Exploration:     Hemostasis achieved with:  Direct pressure    Wound exploration: entire depth of wound visualized      Contaminated: no    Treatment:     Area cleansed with:  Chlorhexidine    Amount of cleaning:  Extensive    Irrigation solution:  Sterile saline    Visualized foreign bodies/material removed: no    Skin repair:     Repair method:  Sutures    Suture size:  5-0    Suture material:  Nylon    Suture technique:  Simple interrupted    Number of sutures:  16  Approximation:     Approximation:  Close  Repair type:     Repair type:  Complex  Post-procedure details:     Procedure completion:  Tolerated well, no immediate complications        {EM_CC (Optional):58020}      PROGRESS, DATA ANALYSIS, CONSULTS, AND MEDICAL DECISION MAKING  All labs have been independently interpreted by me.  All radiology studies have been reviewed by me. All EKG's have been independently viewed and interpreted by me.  Discussion below  represents my analysis of pertinent findings related to patient's condition, differential diagnosis, treatment plan and final disposition.    Patient presentation and evaluation most consistent with closed head injury and complicated laceration to the right forehead.  The wound has been closed with simple interrupted sutures and will require follow-up in the next 5 to 7 days for removal.  She will be given closed head injury precautions after negative CT imaging in the ED.  Reassuring workup otherwise.  Tolerating symptoms well on reassessment.  Appropriate for outpatient management at this time.    DIFFERENTIAL DIAGNOSIS INCLUDE BUT NOT LIMITED TO:     Differential diagnosis for head injury includes but is not limited to:  - subarachnoid hemorrhage  - subdural hematoma  - epidural hematoma  - concussion  - scalp contusion      Clinical Scores: N/A***             1950 I rechecked the patient.  I discussed the patient's labs, radiology findings (including all incidental findings), diagnosis, and plan for discharge.  A repeat exam reveals no new worrisome changes from my initial exam findings.  The patient understands that the fact that they are being discharged does not denote that nothing is abnormal, it indicates that no clinical emergency is present and that they must follow-up as directed in order to properly maintain their health.  Follow-up instructions (specifically listed below) and return to ER precautions were given at this time.  I specifically instructed the patient to follow-up with their PCP.  The patient understands and agrees with the plan, and is ready for discharge.  All questions answered.         AS OF 20:24 EDT VITALS:    BP - 168/98  HR - 84  TEMP - 97.7 °F (36.5 °C)  O2 SATS - 98%    COMPLEXITY OF CARE  Admission was considered but after careful review of the patient's presentation, physical examination, diagnostic results, and response to treatment the patient may be safely discharged with  outpatient follow-up.      DIAGNOSIS  Final diagnoses:   CHI (closed head injury), initial encounter   Facial laceration, initial encounter   Contusion of left knee, initial encounter   Contusion of right hand, initial encounter         DISPOSITION  ED Disposition       ED Disposition   Discharge    Condition   Stable    Comment   --                Please note that portions of this document were completed with a voice recognition program.    Note Disclaimer: At [facility], we believe that sharing information builds trust and better relationships. You are receiving this note because you recently visited [facility]. It is possible you will see health information before a provider has talked with you about it. This kind of information can be easy to misunderstand. To help you fully understand what it means for your health, we urge you to discuss this note with your provider.

## 2025-04-11 NOTE — ED NOTES
Pt arrives for a fall while walking the dog. Pt landed on the concrete and has an open wound to her right forehead. Pt also reports left knee pain. Pt denies LOC or blood thinners

## 2025-04-11 NOTE — DISCHARGE INSTRUCTIONS
Rest, ice, elevate to reduce swelling.  SUTURES TO BE REMOVED IN 5-7 DAYS BY YOUR PRIMARY CARE PROVIDER.  Keep dressing in place and keep area clean and dry for 24-48 hours. After this time, remove dressing, wash with antibacterial soap and water 2-3 times per day and apply antibiotic ointment daily.   Keep covered with gauze or bandaid.  Follow up with primary care for further management and to have your blood pressure rechecked.   Return to ER for excessive pain, swelling, numbness/tingling, fever, chills, weakness, redness, red streaking, drainage, bleeding, decreased sensation, or other worsening/concerning symptoms.

## 2025-04-21 ENCOUNTER — OFFICE VISIT (OUTPATIENT)
Age: 75
End: 2025-04-21
Payer: MEDICARE

## 2025-04-21 VITALS — RESPIRATION RATE: 14 BRPM | BODY MASS INDEX: 29.07 KG/M2 | HEIGHT: 61 IN | WEIGHT: 154 LBS

## 2025-04-21 DIAGNOSIS — E78.2 MIXED HYPERLIPIDEMIA: ICD-10-CM

## 2025-04-21 DIAGNOSIS — Z48.02 ENCOUNTER FOR REMOVAL OF SUTURES: ICD-10-CM

## 2025-04-21 DIAGNOSIS — E03.9 HYPOTHYROIDISM, UNSPECIFIED TYPE: Primary | ICD-10-CM

## 2025-04-21 DIAGNOSIS — Z11.59 NEED FOR HEPATITIS C SCREENING TEST: ICD-10-CM

## 2025-04-21 DIAGNOSIS — R73.03 PRE-DIABETES: ICD-10-CM

## 2025-04-21 NOTE — PROGRESS NOTES
Patient comes in with complaints of having sutures to be removed.  She fell the emergency room 2 facial lacerations both above the eyebrow there was a total of 20 9 sutures all of the sutures were embedded in underneath the suture line.    Procedure note    The sutures were removed without complication.  They were embedded underneath the skin and it took longer time than we had initially estimated.  It took us approximately 15 minutes to remove all of the sutures the patient tolerated the procedure well the wound was dry there was no and signs of any infection.  Answers submitted by the patient for this visit:  Problem not listed (Submitted on 4/15/2025)  Chief Complaint: Other medical problem  Reason for appointment: For removal of sutures  abdominal pain: No  anorexia: No  joint pain: No  change in stool: No  chest pain: No  chills: No  nasal congestion: No  cough: No  diaphoresis: No  fatigue: No  fever: No  headaches: No  joint swelling: No  myalgias: No  nausea: No  neck pain: No  numbness: No  rash: No  sore throat: No  swollen glands: No  dysuria: No  vertigo: No  visual change: No  vomiting: No  weakness: No  Other symptom: Back issues  Onset: 6 to 12 months  Chronicity: recurrent  Frequency: intermittently  Medications tried: Surgery and epidural injection

## 2025-04-22 ENCOUNTER — HOSPITAL ENCOUNTER (OUTPATIENT)
Facility: SURGERY CENTER | Age: 75
Setting detail: HOSPITAL OUTPATIENT SURGERY
Discharge: HOME | End: 2025-04-22
Attending: ANESTHESIOLOGY | Admitting: ANESTHESIOLOGY
Payer: MEDICARE

## 2025-04-22 ENCOUNTER — HOSPITAL ENCOUNTER (OUTPATIENT)
Dept: GENERAL RADIOLOGY | Facility: SURGERY CENTER | Age: 75
Setting detail: HOSPITAL OUTPATIENT SURGERY
End: 2025-04-22
Payer: MEDICARE

## 2025-04-22 VITALS
RESPIRATION RATE: 16 BRPM | OXYGEN SATURATION: 97 % | SYSTOLIC BLOOD PRESSURE: 129 MMHG | DIASTOLIC BLOOD PRESSURE: 83 MMHG | HEART RATE: 72 BPM | HEIGHT: 61 IN | TEMPERATURE: 98.9 F | BODY MASS INDEX: 29.07 KG/M2 | WEIGHT: 154 LBS

## 2025-04-22 DIAGNOSIS — M53.3 SACROILIAC JOINT DYSFUNCTION OF BOTH SIDES: ICD-10-CM

## 2025-04-22 DIAGNOSIS — Z41.9 SURGERY, ELECTIVE: ICD-10-CM

## 2025-04-22 PROCEDURE — 25010000002 LIDOCAINE PF 1% 1 % SOLUTION 5 ML VIAL: Performed by: ANESTHESIOLOGY

## 2025-04-22 PROCEDURE — 25510000001 IOPAMIDOL 61 % SOLUTION 30 ML VIAL: Performed by: ANESTHESIOLOGY

## 2025-04-22 PROCEDURE — 77002 NEEDLE LOCALIZATION BY XRAY: CPT

## 2025-04-22 PROCEDURE — 76000 FLUOROSCOPY <1 HR PHYS/QHP: CPT

## 2025-04-22 PROCEDURE — 27096 INJECT SACROILIAC JOINT: CPT | Performed by: ANESTHESIOLOGY

## 2025-04-22 PROCEDURE — 25010000002 BUPIVACAINE (PF) 0.5 % SOLUTION 10 ML VIAL: Performed by: ANESTHESIOLOGY

## 2025-04-22 PROCEDURE — 25010000002 METHYLPREDNISOLONE PER 80 MG: Performed by: ANESTHESIOLOGY

## 2025-04-22 PROCEDURE — G0260 INJ FOR SACROILIAC JT ANESTH: HCPCS | Performed by: ANESTHESIOLOGY

## 2025-04-22 RX ORDER — DIAZEPAM 5 MG/1
10 TABLET ORAL ONCE
Status: COMPLETED | OUTPATIENT
Start: 2025-04-22 | End: 2025-04-22

## 2025-04-22 RX ADMIN — DIAZEPAM 10 MG: 5 TABLET ORAL at 13:36

## 2025-04-22 NOTE — DISCHARGE INSTRUCTIONS
[facility] - Post-procedure Instructions          --  While there are no absolute restrictions, it is recommended that you do not perform strenuous activity today. In the morning, you may resume your level of activity as before your block.    --  If you have a band-aid at your injection site, please remove it later today. Observe the area for any redness, swelling, pus-like drainage, or a temperature over 101°. If any of these symptoms occur, please call your doctor at [phone]. If after office hours, leave a message and the on-call provider will return your call.    --  Ice may be applied to your injection site. It is recommended you avoid direct heat (heating pad; hot tub) for 1-2 days.    --  Call [facility] at [phone] if you experience persistent headache, persistent bleeding from the injection site, or severe pain not relieved by heat or oral medication.    --  Do not make important decisions today.    --  Due to the effects of the block and/or the I.V. Sedation, DO NOT drive or operate hazardous machinery for 12 hours.  Local anesthetics may cause numbness after procedure and precautions must be taken with regards to operating equipment as well as with walking, even if ambulating with assistance of another person or with an assistive device.    --  Do not drink alcohol for 12 hours.    -- You may return to work tomorrow, or as directed by your referring doctor.    --  Occasionally you may notice a slight increase in your pain after the procedure. This should start to improve within the next 24-48 hours. Radiofrequency ablation procedure pain may last 3-4 weeks.    --  It may take as long as 3-4 days before you notice a gradual improvement in your pain and/or other symptoms.    -- You may continue to take your prescribed pain medication as needed.    --  Some normal possible side effects of steroid use could include fluid retention, increased blood sugar, dull headache,  increased sweating, increased appetite, mood swings and flushing.    --  Diabetics are recommended to watch their blood glucose level closely for 24-48 hours after the injection.    --  Must stay in PACU for 20 min upon arrival and prove no leg weakness before being discharged.    --  IN THE EVENT OF A LIFE THREATENING EMERGENCY, (CHEST PAIN, BREATHING DIFFICULTIES, PARALYSIS…) YOU SHOULD GO TO YOUR NEAREST EMERGENCY ROOM.    --  You should be contacted by our office within 2-3 days to schedule follow up or next appointment date.  If not contacted within 7 days, please call the office at [phone]

## 2025-04-22 NOTE — OP NOTE
Bilateral Sacroiliac Joint Injection  [facility]      PREOPERATIVE DIAGNOSIS:   Sacroiliac joint dysfunction, bilateral    POSTOPERATIVE DIAGNOSIS:  Sacroiliac joint dysfunction, bilateral    PROCEDURE:  Sacroiliac Joint Injection, Bilaterally, with fluoroscopic guidance    PRE-PROCEDURE DISCUSSION WITH PATIENT:    Risks and complications were discussed with the patient prior to starting the procedure and informed consent was obtained.  We discussed various topics including but not limited to bleeding, infection, injury, postprocedural site soreness, painful flareup, worsening of clinical picture, paralysis, coma, and death.     SURGEON:  Raymond Cheek MD    REASON FOR PROCEDURE:    Patient has pain consistent with SI pathology on history and physical exam. Patient has other lumbrosacral pathology that makes the injection diagnostically necessary. Positive sacroiliac provocation maneuvers noted.      SEDATION:  Patient declined administration of moderate sedation  , The patient had higher than average levels of procedural anxiety and the need to provide additional procedural sedation was needed to safely proceed., and She received 10 mg oral diazepam in the preoperative area  ANESTHETIC AGENT:  Marcaine 0.5%  STEROID AGENT:  Methylprednisolone (DEPO MEDROL) 80mg/ml    DESCRIPTON OF PROCEDURE:  After obtaining informed consent, IV access was not obtained in the preoperative area.  The patient was transported to the operative suite and placed in the prone position with a pillow under the pelvic area. EKG, blood pressure, and pulse oximeter were monitored. The lumbosacral area was prepped with Chloraprep and draped in a sterile fashion. Under fluoroscopic guidance the inferior most portion of the right SI joint was identified. The overlying skin and subcutaneous tissue was anesthetized with 1% lidocaine. A 22-gauge spinal needle was introduced from the inferior most portion of the joint into the  RIGHT SI joint under fluoroscopic guidance in the AP dimension with slight oblique rotation to the contralateral side.  Aspiration was negative.  After confirming the position of the needle with fluoroscopy, 1 mL of Omnipaque was injected and after seeing appropriate spread into the joint a total of 2mL Marcaine, with the steroid, was injected very slowly.  Needle was removed intact.  A similar procedure was performed on the LEFT side.   Vital signs remained stable.  The onset of analgesia was noted.      ESTIMATED BLOOD LOSS:  minimal  SPECIMENS:  None  COMPLICATIONS:  No complications were noted., There was no indication of vascular uptake on live injection of contrast dye., and The patient did not have any signs of postprocedure numbness nor weakness.    TOLERANCE & DISCHARGE CONDITION:    The patient tolerated the procedure well.  The patient was transported to the recovery area without difficulties.  The patient was discharged to home under the care of family in stable and satisfactory condition.    PLAN OF CARE:  The patient was given our standard instruction sheet and will resume all medications as per the medication reconciliation sheet.  The patient will Return to clinic 1 wk.  The patient is instructed to keep a pain log hourly for 8 hours after the procedure.

## 2025-04-26 LAB
ALBUMIN SERPL-MCNC: 4.3 G/DL (ref 3.8–4.8)
ALP SERPL-CCNC: 152 IU/L (ref 44–121)
ALT SERPL-CCNC: 16 IU/L (ref 0–32)
AST SERPL-CCNC: 19 IU/L (ref 0–40)
BASOPHILS # BLD AUTO: 0 X10E3/UL (ref 0–0.2)
BASOPHILS NFR BLD AUTO: 1 %
BILIRUB DIRECT SERPL-MCNC: 0.12 MG/DL (ref 0–0.4)
BILIRUB SERPL-MCNC: 0.4 MG/DL (ref 0–1.2)
BUN SERPL-MCNC: 12 MG/DL (ref 8–27)
BUN/CREAT SERPL: 17 (ref 12–28)
CALCIUM SERPL-MCNC: 10.2 MG/DL (ref 8.7–10.3)
CHLORIDE SERPL-SCNC: 101 MMOL/L (ref 96–106)
CHOLEST SERPL-MCNC: 193 MG/DL (ref 100–199)
CO2 SERPL-SCNC: 24 MMOL/L (ref 20–29)
CREAT SERPL-MCNC: 0.72 MG/DL (ref 0.57–1)
EGFRCR SERPLBLD CKD-EPI 2021: 87 ML/MIN/1.73
EOSINOPHIL # BLD AUTO: 0.1 X10E3/UL (ref 0–0.4)
EOSINOPHIL NFR BLD AUTO: 2 %
ERYTHROCYTE [DISTWIDTH] IN BLOOD BY AUTOMATED COUNT: 14.7 % (ref 11.7–15.4)
GLUCOSE SERPL-MCNC: 107 MG/DL (ref 70–99)
HBA1C MFR BLD: 6.9 % (ref 4.8–5.6)
HCT VFR BLD AUTO: 40.2 % (ref 34–46.6)
HCV IGG SERPL QL IA: NON REACTIVE
HDLC SERPL-MCNC: 65 MG/DL
HGB BLD-MCNC: 12.3 G/DL (ref 11.1–15.9)
IMM GRANULOCYTES # BLD AUTO: 0 X10E3/UL (ref 0–0.1)
IMM GRANULOCYTES NFR BLD AUTO: 1 %
LDLC SERPL CALC-MCNC: 95 MG/DL (ref 0–99)
LYMPHOCYTES # BLD AUTO: 2.3 X10E3/UL (ref 0.7–3.1)
LYMPHOCYTES NFR BLD AUTO: 33 %
MCH RBC QN AUTO: 26.4 PG (ref 26.6–33)
MCHC RBC AUTO-ENTMCNC: 30.6 G/DL (ref 31.5–35.7)
MCV RBC AUTO: 86 FL (ref 79–97)
MONOCYTES # BLD AUTO: 0.8 X10E3/UL (ref 0.1–0.9)
MONOCYTES NFR BLD AUTO: 12 %
NEUTROPHILS # BLD AUTO: 3.6 X10E3/UL (ref 1.4–7)
NEUTROPHILS NFR BLD AUTO: 51 %
PLATELET # BLD AUTO: 289 X10E3/UL (ref 150–450)
POTASSIUM SERPL-SCNC: 4.6 MMOL/L (ref 3.5–5.2)
RBC # BLD AUTO: 4.66 X10E6/UL (ref 3.77–5.28)
SODIUM SERPL-SCNC: 140 MMOL/L (ref 134–144)
TRIGL SERPL-MCNC: 193 MG/DL (ref 0–149)
TSH SERPL DL<=0.005 MIU/L-ACNC: 0.33 UIU/ML (ref 0.45–4.5)
VLDLC SERPL CALC-MCNC: 33 MG/DL (ref 5–40)
WBC # BLD AUTO: 6.9 X10E3/UL (ref 3.4–10.8)

## 2025-04-29 ENCOUNTER — OFFICE VISIT (OUTPATIENT)
Age: 75
End: 2025-04-29
Payer: MEDICARE

## 2025-04-29 VITALS
WEIGHT: 151 LBS | SYSTOLIC BLOOD PRESSURE: 132 MMHG | HEART RATE: 76 BPM | DIASTOLIC BLOOD PRESSURE: 74 MMHG | HEIGHT: 61 IN | RESPIRATION RATE: 14 BRPM | BODY MASS INDEX: 28.51 KG/M2 | TEMPERATURE: 97.3 F | OXYGEN SATURATION: 98 %

## 2025-04-29 DIAGNOSIS — E11.8 CONTROLLED TYPE 2 DIABETES MELLITUS WITH COMPLICATION, WITHOUT LONG-TERM CURRENT USE OF INSULIN: ICD-10-CM

## 2025-04-29 DIAGNOSIS — M54.16 LEFT LUMBAR RADICULOPATHY: ICD-10-CM

## 2025-04-29 DIAGNOSIS — K21.9 GASTROESOPHAGEAL REFLUX DISEASE WITHOUT ESOPHAGITIS: ICD-10-CM

## 2025-04-29 DIAGNOSIS — C83.01 SMALL B-CELL LYMPHOMA OF LYMPH NODES OF NECK: ICD-10-CM

## 2025-04-29 DIAGNOSIS — Z00.00 MEDICARE ANNUAL WELLNESS VISIT, SUBSEQUENT: Primary | ICD-10-CM

## 2025-04-29 DIAGNOSIS — E03.9 ACQUIRED HYPOTHYROIDISM: ICD-10-CM

## 2025-04-29 DIAGNOSIS — E78.2 MIXED HYPERLIPIDEMIA: ICD-10-CM

## 2025-04-29 PROBLEM — M75.101 ROTATOR CUFF TEAR, RIGHT: Status: ACTIVE | Noted: 2019-05-15

## 2025-04-29 PROBLEM — Z85.09 HISTORY OF CHOLANGIOCARCINOMA: Status: ACTIVE | Noted: 2020-03-03

## 2025-04-29 RX ORDER — TRAMADOL HYDROCHLORIDE 50 MG/1
50 TABLET ORAL EVERY 6 HOURS PRN
Qty: 60 TABLET | Refills: 0 | Status: SHIPPED | OUTPATIENT
Start: 2025-04-29

## 2025-04-29 RX ORDER — MELOXICAM 15 MG/1
15 TABLET ORAL DAILY
Qty: 90 TABLET | Refills: 3 | Status: SHIPPED | OUTPATIENT
Start: 2025-04-29

## 2025-04-29 NOTE — PROGRESS NOTES
Subjective   The ABCs of the Annual Wellness Visit  Medicare Wellness Visit      Imani Shankar is a 74 y.o. patient who presents for a Medicare Wellness Visit.    The following portions of the patient's history were reviewed and   updated as appropriate: allergies, current medications, past family history, past medical history, past social history, past surgical history, and problem list.    Compared to one year ago, the patient's physical   health is better.  Compared to one year ago, the patient's mental   health is better.    Recent Hospitalizations:  This patient has had a [facility] admission record on file within the last 365 days.  Current Medical Providers:  Patient Care Team:  Chang Carter MD as PCP - General (Family Medicine)  Ana Meade MD as Gynecologist (Gynecology)  Jerry Cleveland Jr., MD as Consulting Physician (Hematology and Oncology)  Donnell Felder MD as Referring Physician (Otolaryngology)    Outpatient Medications Prior to Visit   Medication Sig Dispense Refill    acetaminophen (TYLENOL) 325 MG tablet Take 2 tablets by mouth Every 4 (Four) Hours As Needed for Mild Pain.      aspirin 81 MG chewable tablet Chew 1 tablet Daily.      gabapentin (NEURONTIN) 300 MG capsule TAKE 1 CAPSULE BY MOUTH THREE TIMES DAILY 90 capsule 0    levothyroxine (SYNTHROID, LEVOTHROID) 100 MCG tablet Take 1 tablet by mouth Daily. 90 tablet 0    loratadine (CLARITIN) 10 MG tablet Take 1 tablet by mouth Daily As Needed.      metoprolol succinate XL (TOPROL-XL) 50 MG 24 hr tablet Take 1 tablet by mouth Daily.      multivitamin with minerals (MULTIVITAMIN ADULT PO) Take 1 tablet by mouth Daily.      omeprazole (priLOSEC) 40 MG capsule Take 1 capsule by mouth Daily.      pravastatin (PRAVACHOL) 40 MG tablet TAKE 1 TABLET BY MOUTH DAILY (Patient taking differently: Take 1 tablet by mouth Every Night.) 90 tablet 3    tiZANidine (ZANAFLEX) 4 MG tablet TAKE 1 TABLET BY MOUTH EVERY 8 HOURS AS NEEDED  FOR MUSCLE SPASMS 90 tablet 5    traMADol (ULTRAM) 50 MG tablet Take 1 tablet by mouth Every 6 (Six) Hours As Needed for Moderate Pain.       No facility-administered medications prior to visit.     Opioid medication/s are on active medication list.  and I have evaluated her active treatment plan and pain score trends (see table).  Vitals:    04/29/25 1355   PainSc: 3    PainLoc: Back     I have reviewed the chart for potential of high risk medication and harmful drug interactions in the elderly.        Aspirin is on active medication list. Aspirin use is indicated based on review of current medical condition/s. Pros and cons of this therapy have been discussed today. Benefits of this medication outweigh potential harm.  Patient has been encouraged to continue taking this medication.  .      Patient Active Problem List   Diagnosis    Meningioma    Other chronic pain    Left lumbar radiculopathy    Sacroiliac joint dysfunction of left side    Cholangiocarcinoma of biliary tract    Gastroesophageal reflux disease without esophagitis    Hypothyroidism    Mixed hyperlipidemia    TIA (transient ischemic attack)    Small lymphocytic lymphoma    Chronic night sweats    Back pain    Lumbar radiculopathy    Bilateral leg weakness    Spinal stenosis of lumbar region with neurogenic claudication    Herniation of lumbar intervertebral disc with radiculopathy    Degeneration of intervertebral disc of lumbar region with discogenic back pain and lower extremity pain    Calf muscle weakness    Spinal stenosis of lumbar region    Status post lumbar spine surgery for decompression of spinal cord    Bilateral lumbar radiculopathy    Spinal stenosis of lumbar region with radiculopathy    Sacroiliac joint dysfunction of both sides    History of cholangiocarcinoma    Rotator cuff tear, right     Advance Care Planning Advance Directive is not on file.  ACP discussion was held with the patient during this visit. Patient has an advance  "directive (not in EMR), copy requested.            Objective   Vitals:    04/29/25 1355   Pulse: 76   Resp: 14   Temp: 97.3 °F (36.3 °C)   TempSrc: Temporal   SpO2: 98%   Weight: 68.5 kg (151 lb)   Height: 154.9 cm (60.98\")   PainSc: 3    PainLoc: Back     Physical Exam  Constitutional:       General: She is not in acute distress.     Appearance: Normal appearance. She is not ill-appearing, toxic-appearing or diaphoretic.   HENT:      Head: Normocephalic and atraumatic.      Right Ear: There is no impacted cerumen.      Left Ear: There is no impacted cerumen.      Nose: No congestion or rhinorrhea.      Mouth/Throat:      Pharynx: Oropharynx is clear. No oropharyngeal exudate or posterior oropharyngeal erythema.   Eyes:      General: No scleral icterus.        Right eye: No discharge.         Left eye: No discharge.      Extraocular Movements: Extraocular movements intact.      Conjunctiva/sclera: Conjunctivae normal.      Pupils: Pupils are equal, round, and reactive to light.   Cardiovascular:      Rate and Rhythm: Normal rate and regular rhythm.      Pulses: Normal pulses.      Heart sounds: Normal heart sounds.   Pulmonary:      Effort: Pulmonary effort is normal.      Breath sounds: Normal breath sounds.   Abdominal:      General: Abdomen is flat. Bowel sounds are normal.      Palpations: Abdomen is soft.   Musculoskeletal:         General: Normal range of motion.      Cervical back: Normal range of motion and neck supple.   Skin:     General: Skin is warm.   Neurological:      General: No focal deficit present.      Mental Status: She is alert and oriented to person, place, and time. Mental status is at baseline.   Psychiatric:         Mood and Affect: Mood normal.         Behavior: Behavior normal.         Thought Content: Thought content normal.         Judgment: Judgment normal.        Estimated body mass index is 28.55 kg/m² as calculated from the following:    Height as of this encounter: 154.9 cm " "(60.98\").    Weight as of this encounter: 68.5 kg (151 lb).                Does the patient have evidence of cognitive impairment? No  Lab Results   Component Value Date    CHLPL 193 2025    TRIG 193 (H) 2025    HDL 65 2025    LDL 95 2025    VLDL 33 2025    HGBA1C 6.9 (H) 2025                                                                                               Health  Risk Assessment    Smoking Status:  Social History     Tobacco Use   Smoking Status Never    Passive exposure: Never   Smokeless Tobacco Never     Alcohol Consumption:  Social History     Substance and Sexual Activity   Alcohol Use No       Fall Risk Screen  STEADI Fall Risk Assessment was completed, and patient is at HIGH risk for falls. Assessment completed on:2025    Depression Screening   Little interest or pleasure in doing things? Not at all   Feeling down, depressed, or hopeless? Not at all   PHQ-2 Total Score 0      Health Habits and Functional and Cognitive Screenin/29/2025     1:58 PM   Functional & Cognitive Status   Do you have difficulty preparing food and eating? No   Do you have difficulty bathing yourself, getting dressed or grooming yourself? No   Do you have difficulty using the toilet? No   Do you have difficulty moving around from place to place? No   Do you have trouble with steps or getting out of a bed or a chair? No   Current Diet Limited Junk Food   Dental Exam Up to date   Eye Exam Up to date   Exercise (times per week) 5 times per week   Current Exercises Include Walking   Do you need help using the phone?  No   Are you deaf or do you have serious difficulty hearing?  No   Do you need help to go to places out of walking distance? No   Do you need help shopping? No   Do you need help preparing meals?  No   Do you need help with housework?  No   Do you need help with laundry? No   Do you need help taking your medications? No   Do you need help managing money? No   Do " you ever drive or ride in a car without wearing a seat belt? No   Have you felt unusual stress, anger or loneliness in the last month? No   Who do you live with? Spouse   If you need help, do you have trouble finding someone available to you? No   Have you been bothered in the last four weeks by sexual problems? No   Do you have difficulty concentrating, remembering or making decisions? No           Age-appropriate Screening Schedule:  Refer to the list below for future screening recommendations based on patient's age, sex and/or medical conditions. Orders for these recommended tests are listed in the plan section. The patient has been provided with a written plan.    Health Maintenance List  Health Maintenance   Topic Date Due    ANNUAL WELLNESS VISIT  07/21/2024    COVID-19 Vaccine (9 - Pfizer risk 2024-25 season) 04/11/2025    Pneumococcal Vaccine 50+ (2 of 2 - PPSV23) 12/03/2025 (Originally 10/12/2022)    INFLUENZA VACCINE  07/01/2025    DXA SCAN  08/17/2025    LIPID PANEL  04/25/2026    COLORECTAL CANCER SCREENING  10/28/2027    TDAP/TD VACCINES (3 - Td or Tdap) 04/11/2035    HEPATITIS C SCREENING  Completed    RSV Vaccine - Adults  Completed    ZOSTER VACCINE  Completed    MAMMOGRAM  Discontinued                                                                                                                                                CMS Preventative Services Quick Reference  Risk Factors Identified During Encounter  Immunizations Discussed/Encouraged: Influenza and COVID19    The above risks/problems have been discussed with the patient.  Pertinent information has been shared with the patient in the After Visit Summary.  An After Visit Summary and PPPS were made available to the patient.    Follow Up:   Next Medicare Wellness visit to be scheduled in 1 year.     Assessment & Plan  Medicare annual wellness visit, subsequent         Mixed hyperlipidemia            Acquired hypothyroidism          Gastroesophageal reflux disease without esophagitis         Left lumbar radiculopathy    Orders:    traMADol (ULTRAM) 50 MG tablet; Take 1 tablet by mouth Every 6 (Six) Hours As Needed for Moderate Pain.    meloxicam (MOBIC) 15 MG tablet; Take 1 tablet by mouth Daily.    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

## 2025-04-29 NOTE — ASSESSMENT & PLAN NOTE
Orders:    traMADol (ULTRAM) 50 MG tablet; Take 1 tablet by mouth Every 6 (Six) Hours As Needed for Moderate Pain.    meloxicam (MOBIC) 15 MG tablet; Take 1 tablet by mouth Daily.

## 2025-05-05 ENCOUNTER — TELEPHONE (OUTPATIENT)
Dept: NEUROSURGERY | Facility: CLINIC | Age: 75
End: 2025-05-05
Payer: MEDICARE

## 2025-05-05 DIAGNOSIS — M54.16 LUMBAR RADICULOPATHY: Primary | ICD-10-CM

## 2025-05-05 NOTE — TELEPHONE ENCOUNTER
"  Caller: SYLVIE    Relationship to patient: SELF    Best call back number: [phone]    Chief complaint:     Type of visit: FOLLOW UP     Requested date: Return in about 1 month (around 3/12/2025) for Dr. Terrazas must see patient at next appointment in 4 weeks.     If rescheduling, when is the original appointment:      Additional notes:PATIENT CALLED TO SCHEDULE FOLLOW UP APPT WITH DR SELINA DREW OVN ON  02.12.25 \"Return in about 1 month (around 3/12/2025) for Dr. Terrazas must see patient at next appointment in 4 weeks.\"     UNABLE TO SCHEDULE WITH IN GIVEN TIME FRAME    PLEASE ADVISE  THANK YOU  "

## 2025-05-05 NOTE — TELEPHONE ENCOUNTER
Spoke w/ patient to get her scheduled. Patient now has an appointment on 05- @ 10:30 am. Patient voiced understanding.

## 2025-05-09 NOTE — PROGRESS NOTES
Subjective   Patient ID: Imani Shankar is a 74 y.o. female is here today for follow-up.    History of Present Illness    She is with her .  It has been about 5 months since her L4-L5 L5-S1 decompression and discectomy.  The weakness that she had prior to that in the posterior buttock and thigh pain is much better.  She was having some increased pain in her right anterior thigh and left side when she last saw our nurse practitioner I felt that was from the stenosis of the levels I did not operate on.  She is having some low back pain as well.  The L2-L3 bilateral transforaminal block seem to help her back in March.  The sacroiliac blocks that she had last month did not do as much.  But she is actually still doing much better than she was prior to the surgery.  The right anterior thigh pain is starting to recur in just a bit.  She is post to see the [facility] next month.  I do not mind if she has another block.  They have her on gabapentin at 300 mg 3 times daily.  She has no motor deficits today.  We held off on therapy previously because of the severe postoperative pain.  She wants to give it a try and we will send her to to the therapist for that.  Will see her in 3 months with x-rays.  I do not think we need to consider another surgery right now given the improvement.  Her son Drew their neuroradiologist in [place] was on the phone.  I told him that if we had to do something else, it might not necessarily be a multilevel decompression and fusion.  It might just simply be an extension of her laminectomy and decompression to L3-L4 and L2-L3.  But will see next time how she is doing.        The following portions of the patient's history were reviewed and updated as appropriate: allergies, current medications, past family history, past medical history, past social history, past surgical history, and problem list.    Review of Systems   All other systems reviewed and are negative.      Objective    Physical Exam  Constitutional:       General: She is awake.      Appearance: She is well-developed.   HENT:      Head: Normocephalic and atraumatic.   Eyes:      General: Lids are normal.      Extraocular Movements: Extraocular movements intact.      Conjunctiva/sclera: Conjunctivae normal.      Pupils: Pupils are equal, round, and reactive to light.   Neck:      Vascular: No carotid bruit.   Neurological:      Mental Status: She is alert.      Coordination: Coordination is intact.      Deep Tendon Reflexes:      Reflex Scores:       Tricep reflexes are 2+ on the right side and 2+ on the left side.       Bicep reflexes are 2+ on the right side and 2+ on the left side.       Brachioradialis reflexes are 2+ on the right side and 2+ on the left side.       Patellar reflexes are 2+ on the right side and 2+ on the left side.       Achilles reflexes are 2+ on the right side and 2+ on the left side.  Psychiatric:         Speech: Speech normal.       Neurological Exam  Mental Status  Awake and alert. Oriented only to person, place, time and situation. Recent and remote memory are intact. Speech is normal. Language is fluent with no aphasia. Attention and concentration are normal. Fund of knowledge is appropriate for level of education.    Cranial Nerves  CN II: Visual acuity is normal. Visual fields full to confrontation.  CN III, IV, VI: Extraocular movements intact bilaterally. Normal lids and orbits bilaterally. Pupils equal round and reactive to light bilaterally.  CN V: Facial sensation is normal.  CN VII: Full and symmetric facial movement.  CN IX, X: Palate elevates symmetrically. Normal gag reflex.  CN XI: Shoulder shrug strength is normal.  CN XII: Tongue midline without atrophy or fasciculations.    Motor  Normal muscle bulk throughout. Normal muscle tone.                                               Right                     Left  Rhomboids                            5                          5  Infraspinatus                           5                          5  Supraspinatus                       5                          5  Deltoid                                   5                          5   Biceps                                   5                          5  Brachioradialis                      5                          5   Triceps                                  5                          5   Pronator                                5                          5   Supinator                              5                           5   Wrist flexor                            5                          5   Wrist extensor                       5                          5   Finger flexor                          5                          5   Finger extensor                     5                          5   Interossei                              5                          5   Abductor pollicis brevis         5                          5   Flexor pollicis brevis             5                          5   Opponens pollicis                 5                          5  Extensor digitorum               5                          5  Abductor digiti minimi           5                          5   Abdominal                            5                          5  Glutei                                    5                          5  Hip abductor                         5                          5  Hip adductor                         5                          5   Iliopsoas                               5                          5   Quadriceps                           5                          5   Hamstring                             5                          5   Gastrocnemius                     5                           5   Anterior tibialis                      5                          5   Posterior tibialis                    5                          5   Peroneal                               5                           5  Ankle dorsiflexor                   5                          5  Ankle plantar flexor              5                           5  Extensor hallucis longus      5                           5    Sensory  Light touch is normal in upper and lower extremities. Proprioception is normal in upper and lower extremities.     Reflexes                                            Right                      Left  Brachioradialis                    2+                         2+  Biceps                                 2+                         2+  Triceps                                2+                         2+  Finger flex                           2+                         2+  Hamstring                            2+                         2+  Patellar                                2+                         2+  Achilles                                2+                         2+    Coordination    Finger-to-nose, rapid alternating movements and heel-to-shin normal bilaterally without dysmetria.    Gait  Casual gait is normal including stance, stride, and arm swing.Normal toe walking. Normal heel walking. Normal tandem gait.       Assessment & Plan   Independent Review of Radiographic Studies:      The lumbar MRI done on 1/30/2025 shows a widely decompressed canal at L4-L5 and L5-S1.  There is persistent stenosis at L2-L3 and L3-L4 compared to the prior study done in December.    Medical Decision Making:      Overall, she is doing much better compared to preoperatively.  The last set of blocks at L2-3 seem to help her.  The sacroiliac injections not so much.  She will use her brace.  Will send her to PT for strengthening and see her in 3 months with x-rays.  If the right sided radiculopathy gets worse, she is to let me know.    Diagnoses and all orders for this visit:    1. Spinal stenosis of lumbar region with neurogenic claudication (Primary)  -     Ambulatory Referral to Physical Therapy for  Evaluation & Treatment  -     XR Spine Lumbar Complete With Flex & Ext; Future    2. History of lumbar surgery  -     Ambulatory Referral to Physical Therapy for Evaluation & Treatment  -     XR Spine Lumbar Complete With Flex & Ext; Future    3. Lumbar radiculopathy, right  -     Ambulatory Referral to Physical Therapy for Evaluation & Treatment  -     XR Spine Lumbar Complete With Flex & Ext; Future      Return in about 3 months (around 8/12/2025) for Face-to-face.

## 2025-05-12 ENCOUNTER — OFFICE VISIT (OUTPATIENT)
Dept: NEUROSURGERY | Facility: CLINIC | Age: 75
End: 2025-05-12
Payer: MEDICARE

## 2025-05-12 VITALS — OXYGEN SATURATION: 98 % | HEART RATE: 64 BPM | DIASTOLIC BLOOD PRESSURE: 78 MMHG | SYSTOLIC BLOOD PRESSURE: 142 MMHG

## 2025-05-12 DIAGNOSIS — M48.062 SPINAL STENOSIS OF LUMBAR REGION WITH NEUROGENIC CLAUDICATION: Primary | ICD-10-CM

## 2025-05-12 DIAGNOSIS — M54.16 LUMBAR RADICULOPATHY, RIGHT: ICD-10-CM

## 2025-05-12 DIAGNOSIS — Z98.890 HISTORY OF LUMBAR SURGERY: ICD-10-CM

## 2025-05-12 PROCEDURE — 1159F MED LIST DOCD IN RCRD: CPT | Performed by: NEUROLOGICAL SURGERY

## 2025-05-12 PROCEDURE — 99213 OFFICE O/P EST LOW 20 MIN: CPT | Performed by: NEUROLOGICAL SURGERY

## 2025-05-12 PROCEDURE — 1160F RVW MEDS BY RX/DR IN RCRD: CPT | Performed by: NEUROLOGICAL SURGERY

## 2025-05-13 ENCOUNTER — OFFICE VISIT (OUTPATIENT)
Dept: PAIN MEDICINE | Facility: CLINIC | Age: 75
End: 2025-05-13
Payer: MEDICARE

## 2025-05-13 VITALS
WEIGHT: 154 LBS | DIASTOLIC BLOOD PRESSURE: 84 MMHG | TEMPERATURE: 97.3 F | RESPIRATION RATE: 16 BRPM | HEART RATE: 68 BPM | BODY MASS INDEX: 29.07 KG/M2 | SYSTOLIC BLOOD PRESSURE: 140 MMHG | OXYGEN SATURATION: 97 % | HEIGHT: 61 IN

## 2025-05-13 DIAGNOSIS — M48.061 SPINAL STENOSIS OF LUMBAR REGION WITH RADICULOPATHY: Primary | ICD-10-CM

## 2025-05-13 DIAGNOSIS — M54.16 LUMBAR RADICULOPATHY: ICD-10-CM

## 2025-05-13 DIAGNOSIS — M54.16 SPINAL STENOSIS OF LUMBAR REGION WITH RADICULOPATHY: Primary | ICD-10-CM

## 2025-05-13 PROCEDURE — 1159F MED LIST DOCD IN RCRD: CPT | Performed by: NURSE PRACTITIONER

## 2025-05-13 PROCEDURE — 99213 OFFICE O/P EST LOW 20 MIN: CPT | Performed by: NURSE PRACTITIONER

## 2025-05-13 PROCEDURE — 1160F RVW MEDS BY RX/DR IN RCRD: CPT | Performed by: NURSE PRACTITIONER

## 2025-05-13 PROCEDURE — 1125F AMNT PAIN NOTED PAIN PRSNT: CPT | Performed by: NURSE PRACTITIONER

## 2025-05-13 NOTE — PROGRESS NOTES
CHIEF COMPLAINT  Back pain  SACROILIAC INJECTION BILATERAL   Patient reports 25% relief ongoing.    Subjective   Imani Shankar is a 74 y.o. female  who presents to the office for follow-up of procedure.  She completed a SACROILIAC INJECTION BILATERAL    on  04/22/25 performed by Dr. Cheek for management of back pain. Patient reports 25% relief from the procedure.     Pain today 4/10 VAS.  She complains primarily of right anterior/lateral leg pain at this time.  The pain is aggravated primarily by standing and walking and improves with sitting down and rest.  She is taking Gabapentin 300 mg TID which helps some, it also makes her drowsy.  Additionally she utilizes Meloxicam 15 mg daily (PCP), Tizanidine 4 mg PRN, Tylenol PRN.      She will be starting PT in two weeks.      Procedures ---  bilateral L3 LTFESI   on  3/5/2025 --- 70% relief from the procedure x 2 months (Still not entirely back to baseline levels)     Pertinent surgical history---  12/17/2024 - lumbar laminectomy decompression/discectomy L4/L5 and L5/S1 (Dr. Terrazas)    Back Pain  Chronicity:  Chronic  Frequency:  Daily  Pain location:  Lumbar spine  Radiates to:  Right thigh  Pain-numeric:  4/10  Aggravated by:  Standing, twisting and bending  Associated symptoms: leg pain and numbness (right lower leg)    Associated symptoms: no weakness         PEG Assessment   What number best describes your pain on average in the past week?5  What number best describes how, during the past week, pain has interfered with your enjoyment of life?7  What number best describes how, during the past week, pain has interfered with your general activity?  6    Review of Pertinent Medical Data ---    The following portions of the patient's history were reviewed and updated as appropriate: allergies, current medications, past family history, past medical history, past social history, past surgical history, and problem list.    Review of Systems   Constitutional:  Negative  "for activity change.   Gastrointestinal:  Negative for constipation and diarrhea.   Genitourinary:  Negative for difficulty urinating.   Musculoskeletal:  Positive for back pain.   Neurological:  Positive for numbness (right lower leg). Negative for weakness.   Psychiatric/Behavioral:  Positive for sleep disturbance. Negative for self-injury and suicidal ideas.      I have reviewed and confirmed the accuracy of the ROS as documented by the MA/LPN/RN ALICIA Page     Vitals:    05/13/25 1524   BP: 140/84   Pulse: 68   Resp: 16   Temp: 97.3 °F (36.3 °C)   SpO2: 97%   Weight: 69.9 kg (154 lb)   Height: 154.9 cm (60.98\")   PainSc: 4          Objective   Physical Exam  Vitals and nursing note reviewed.   Constitutional:       General: She is not in acute distress.     Appearance: Normal appearance. She is not ill-appearing.   Pulmonary:      Effort: Pulmonary effort is normal. No respiratory distress.   Musculoskeletal:      Lumbar back: Tenderness and bony tenderness present. Negative right straight leg raise test and negative left straight leg raise test.   Neurological:      Mental Status: She is alert and oriented to person, place, and time.      Motor: Motor function is intact. No weakness.      Gait: Gait abnormal (slowed).   Psychiatric:         Mood and Affect: Mood normal.         Behavior: Behavior normal.         Assessment & Plan   Diagnoses and all orders for this visit:    1. Spinal stenosis of lumbar region with radiculopathy (Primary)    2. Lumbar radiculopathy        --- Begin PT as planned  --- Consider repeating LTFESI PRN every 90 days(right versus bilateral)   --- LESI (likely L3/L4) could be a Plan B  --- Continue Gabapentin  --- Obtain compliance UDS next visit     Imani Shankar reports a pain score of 4.  Given her pain assessment as noted, treatment options were discussed and the following options were decided upon as a follow-up plan to address the patient's pain: continuation of " current treatment plan for pain.    --- Follow-up approximately 6 weeks                ROBERT REPORT    As the clinician, I personally reviewed the ROBERT from 5/13/2025 while the patient was in the office today.    History and physical exam exhibit continued safe and appropriate use of controlled substances.         Dictated utilizing Dragon dictation.

## 2025-05-22 ENCOUNTER — TREATMENT (OUTPATIENT)
Age: 75
End: 2025-05-22
Payer: MEDICARE

## 2025-05-22 DIAGNOSIS — R26.89 DECREASED FUNCTIONAL MOBILITY: ICD-10-CM

## 2025-05-22 DIAGNOSIS — R29.898 WEAKNESS OF BOTH LOWER EXTREMITIES: ICD-10-CM

## 2025-05-22 DIAGNOSIS — M54.16 RADICULOPATHY, LUMBAR REGION: Primary | ICD-10-CM

## 2025-05-22 DIAGNOSIS — Z98.890 HISTORY OF LUMBAR SURGERY: ICD-10-CM

## 2025-05-22 NOTE — PROGRESS NOTES
Physical Therapy Initial Evaluation and Plan of Care  [address]  [place] [address]  P: [phone]  F: [phone]    Patient: Imani Shankar   : 1950  Diagnosis/ICD-10 Code:  Radiculopathy, lumbar region [M54.16]  Referring practitioner: Franklyn Terrazas MD  Date of Initial Visit: 2025  Today's Date: 2025  Patient seen for 1 session         Visit Diagnoses:    ICD-10-CM ICD-9-CM   1. Radiculopathy, lumbar region  M54.16 724.4   2. Weakness of both lower extremities  R29.898 729.89   3. Decreased functional mobility  R26.89 781.99   4. History of lumbar surgery  Z98.890 V15.29         Subjective Questionnaire: Oswestry: 66%      Subjective Evaluation    History of Present Illness  Date of surgery: 2024  Mechanism of injury: Pt is a 73 y/o female who presents to physical therapy with c/o low back pain, right more than left, that began after a lumbar surgery she had in December. Pt had a lumbar decompression/disectomy surgery at L4-L5 and L5-S1. Pt reports that she feels so much better since surgery, prior to surgery she could hardly walk. Pt reports that since she has intermittent radiating posterior right hip and anterior right thigh pain with prolonged standing and walking. Pt reports it also stays in the midline of her low back. Pt reports that the lower part of her right leg is numb, but the doctor said that could take up to a year. Pt reports that she also had an epidural and SI injection since surgery and that overall she feels about 60% better. Pt also reports a hx of falls, pt reports she was walking her grand dog and she fell on her face on the concrete and needed 25 stitches. Pt reports she feels like her legs are weak and need to be stronger. She denies sinister symptoms. Pt reports she has the most difficulty standing and walking and can only stand 30 minutes. She also reports sitting can be painful and this varies, but if so only about 30 minutes.        Patient Occupation: Retired RN Pain  Current pain ratin  At worst pain rating: 10  Quality: dull ache  Relieving factors: medications, change in position and rest  Aggravating factors: standing, stairs, sleeping, prolonged positioning, ambulation, squatting, repetitive movement, movement and lifting    Social Support  Lives with: spouse    Treatments  Previous treatment: injection treatment and medication  Current treatment: physical therapy  Patient Goals  Patient goals for therapy: increased strength, independence with ADLs/IADLs, return to sport/leisure activities, increased motion, decreased pain and improved balance         Past Medical History:   Diagnosis Date    Arthritis     Bleeding ulcer 2018    Cancer 2012    gallbladder - removal w/adjunct chemo and XRT (Dr. Kraft, Dr. Lanier)    Chronic low back pain     Disease of thyroid gland     Duodenal ulcer     Extremity pain Left leg pain    GERD (gastroesophageal reflux disease)     GI (gastrointestinal bleed) 2018    High cholesterol     History of chemotherapy     History of radiation therapy     History of transfusion     Hypertension     Leg weakness, bilateral     Lumbosacral disc disease 2024    Peripheral neuropathy     Small lymphocytic lymphoma     Spinal stenosis     LUMBAR    TIA (transient ischemic attack)      Past Surgical History:   Procedure Laterality Date    APPENDECTOMY      BACK SURGERY      CATARACT EXTRACTION      CHOLECYSTECTOMY  2012    for gall bladder cancer, removed by Dr. Kendy Martinez    COLONOSCOPY  approx     Too YUEN,  normal per patient    COLONOSCOPY N/A 2022    Procedure: COLONOSCOPY to cecum and TI:  bx polyps, cold snare polyp;  Surgeon: Janet Hamilton MD;  Location: [facility] ENDOSCOPY;  Service: Gastroenterology;  Laterality: N/A;  pre:  screening  post:  polyps, divertidulosis, hemorrhoids    ENDOSCOPY N/A 2019    Procedure: ESOPHAGOGASTRODUODENOSCOPY with  biopsy;  Surgeon: Ruby Ferrari MD;  Location: [facility] ENDOSCOPY;  Service: Gastroenterology    EPIDURAL BLOCK      LAMINECTOMY  12/17/2024    LUMBAR LAMINECTOMY DISCECTOMY DECOMPRESSION N/A 12/17/2024    Procedure: Open bilateral lumbar decompression/discectomy lumbar four/five, lumbar five/sacral one;  Surgeon: Franklyn Terrazas MD;  Location: [facility] MAIN OR;  Service: Neurosurgery;  Laterality: N/A;    SACROILIAC JOINT INJECTION Bilateral 4/22/2025    Procedure: SACROILIAC INJECTION BILATERAL 80672;  Surgeon: Raymond Cheek MD;  Location: [facility] MAIN OR;  Service: Pain Management;  Laterality: Bilateral;    SHOULDER ARTHROSCOPY W/ ROTATOR CUFF REPAIR Right 09/28/2017    Procedure: RT SHOULDER ARTHROSCOPY ACROMIOPLASTY ROTATOR CUFF REPAIR AND LABRAL DEBRIDEMENT;  Surgeon: Nicho Hill MD;  Location: [facility] OR OSC;  Service:     UPPER GASTROINTESTINAL ENDOSCOPY  02/02/2018    [facility]    multipal duodenal ulcers in D1-D2 area, no active bleed    UPPER GASTROINTESTINAL ENDOSCOPY  01/31/2018    [facility]   oozing blood of small ulcer in D1-D2 region, clot overlying ulcer, injected at base, endo clips placed    UPPER GASTROINTESTINAL ENDOSCOPY  03/14/2018    [facility]   previous ulcer almost healed    US GUIDED LYMPH NODE BIOPSY  05/02/2024         Objective        Special Questions      Additional Special Questions  Denies sinister symptoms      Postural Observations  Seated posture: fair  Standing posture: fair      Neurological Testing     Sensation     Lumbar   Left   Intact: light touch    Right   Diminished: light touch    Comments   Right light touch: outside of right calf    Active Range of Motion     Lumbar   Flexion: WFL and with pain  Extension: 50 degrees with pain  Right lateral flexion: WFL and with pain  Left rotation: 25 degrees with pain  Right rotation: 25 degrees     Additional Active Range of Motion Details  Number denotes percent  limitation    Strength/Myotome Testing     Left Hip   Planes of Motion   Flexion: 4  Extension: 3+  Abduction: 3+  Adduction: 4    Right Hip   Planes of Motion   Flexion: 4  Extension: 3+  Abduction: 2+  Adduction: 4    Left Knee   Flexion: 5  Extension: 4-    Right Knee   Flexion: 4+  Extension: 3+    Left Ankle/Foot   Dorsiflexion: 5  Plantar flexion: 5    Right Ankle/Foot   Dorsiflexion: 4+  Plantar flexion: 5    Additional Strength Details  Pt is able to perform supine to long sit position 1, with compensation from lower extremities (abdominal functional strength test)    Tests       Thoracic   Negative slump.     Lumbar     Left   Negative crossed SLR and passive SLR.     Right   Negative crossed SLR and passive SLR.     Ambulation   Weight-Bearing Status   Weight-Bearing Status (Left): full weight bearing   Weight-Bearing Status (Right): full weight-bearing    Assistive device used: none    Ambulation: Level Surfaces   Ambulation without assistive device: independent    Quality of Movement During Gait   Trunk    Trunk (Right): Positive lateral lean over stance limb.           Assessment & Plan       Assessment  Impairments: abnormal gait, abnormal muscle firing, abnormal muscle tone, abnormal or restricted ROM, activity intolerance, impaired balance, impaired physical strength, lacks appropriate home exercise program, pain with function and weight-bearing intolerance   Functional limitations: carrying objects, lifting, sleeping, walking, uncomfortable because of pain, moving in bed, sitting, standing, stooping, reaching overhead and unable to perform repetitive tasks   Assessment details: Pt is a 75 y/o female who presents to physical therapy with signs and symptoms consistent with lumbar radiculopathy. She has intermittent radiating lateral right hip and anterior thigh pain that varies in occurrence and intensity. Pt has decreased lumbar ROM, with more c/o pain with lumbar extension than flexion. She also has  c/o pain with right lateral flexion and left rotation. She has a negative slump and SLR test. In addition, pt has bilateral LE and abdominal strength deficits, with most deficits in right lower extremity. She has some TTP to piriformis and gluteus medius, but reports relief with massage gun assisted soft tissue mobilization in left side lying. I educated her on a home exercise program and she demonstrates good understanding. Pt's case is evolving in nature, and she has multiple co morbidities and personal factors that may affect care including having a lumbar surgery ~5 months ago. Pt has pain and difficulty performing prolonged sitting, prolonged standing, and ambulation. Pt would benefit from skilled physical therapy in order to address the above impairments and activity limitations outlined in this initial evaluation, in order to decrease pain and improve functional mobility.  Barriers to therapy: personal factors: age  Prognosis: good    Goals  Plan Goals: STG 2-4 weeks    1. Pt will be independent in home exercise program  2. Pt will be able to improve lumbar rotation and side bending to pain free in order to be able to turn her body and  a bag of groceries without pain  3. WEN score will be improved to <40% to demonstrate subjective improvement in ADLs/IADls  5. Pt will improve B hip abductor strength to 3+ or greater to demonstrate an improvement in hip strength    LTG 4-8 weeks    1. Pt will be able to ambulate 750 feet without c/o low back pain   2. Pt will be able to  a 5 lbs from the floor pain free and with good lifting mechanics  3. Pt will be able to lift 5 lbs from waist to over shoulder height 5/5 times pain free in order to improve lifting mechanics  4. WEN score will be improved to <20% to demonstrate subjective improvement in ADLs/IADls  5. Pt will have pain free lumbar flexion and extension range of motion   6. Pt will demonstrate an improvement in BLE strength for hips to 4+/5 or  greater      Plan  Therapy options: will be seen for skilled therapy services  Planned modality interventions: cryotherapy, dry needling, electrical stimulation/Russian stimulation, thermotherapy (hydrocollator packs), TENS and traction  Planned therapy interventions: ADL retraining, abdominal trunk stabilization, balance/weight-bearing training, body mechanics training, flexibility, functional ROM exercises, home exercise program, IADL retraining, joint mobilization, manual therapy, neuromuscular re-education, postural training, soft tissue mobilization, spinal/joint mobilization, strengthening, stretching, therapeutic activities and transfer training  Frequency: 2x week  Duration in weeks: 12  Treatment plan discussed with: patient            Timed:         Manual Therapy:    10     mins  28820;     Therapeutic Exercise:    15     mins  93988;     Neuromuscular Shy:    0    mins  67089;    Therapeutic Activity:     0     mins  55228;     Gait Trainin     mins  83850;     Ultrasound:     0     mins  57920;    Ionto                               0    mins   73759  Self Care                       0     mins   28511  Canalith Repos    0     mins 20158      Un-Timed:  Electrical Stimulation:    0     mins  29348 ( );  Dry Needling     0     mins self-pay  Traction     0     mins 61879        Timed Treatment:   25   mins   Total Treatment:     47   mins          PT: Dennise Mayberry PT     License Number: [id number]      Certification Period: 2025 thru 2025  I certify that the therapy services are furnished while this patient is under my care.  The services outlined above are required by this patient, and will be reviewed every 90 days.         Physician Signature:__________________________________________________    PHYSICIAN: Franklyn Terrazas MD  NPI: [id number]                                            Please sign and return via fax to [phone] Thank you, [facility].

## 2025-05-22 NOTE — PATIENT INSTRUCTIONS
Access Code: [email or web address]  URL: [email or web address]  Date: 05/22/2025  Prepared by: Dennise Mayberry    Exercises  - Supine Bridge  - 1 x daily - 7 x weekly - 3 sets - 10 reps  - Supine Lower Trunk Rotation  - 1 x daily - 7 x weekly - 3 sets - 10 reps  - Supine Hip Adduction Isometric with Ball  - 1 x daily - 7 x weekly - 3 sets - 10 reps - 3 sec hold  - Standing Hip Abduction with Counter Support  - 1 x daily - 7 x weekly - 3 sets - 10 reps

## 2025-05-27 ENCOUNTER — TREATMENT (OUTPATIENT)
Age: 75
End: 2025-05-27
Payer: MEDICARE

## 2025-05-27 DIAGNOSIS — R26.89 DECREASED FUNCTIONAL MOBILITY: ICD-10-CM

## 2025-05-27 DIAGNOSIS — Z98.890 HISTORY OF LUMBAR SURGERY: ICD-10-CM

## 2025-05-27 DIAGNOSIS — M54.16 RADICULOPATHY, LUMBAR REGION: Primary | ICD-10-CM

## 2025-05-27 DIAGNOSIS — R29.898 WEAKNESS OF BOTH LOWER EXTREMITIES: ICD-10-CM

## 2025-05-27 PROCEDURE — 97530 THERAPEUTIC ACTIVITIES: CPT | Performed by: PHYSICAL THERAPIST

## 2025-05-27 PROCEDURE — 97140 MANUAL THERAPY 1/> REGIONS: CPT | Performed by: PHYSICAL THERAPIST

## 2025-05-27 PROCEDURE — 97110 THERAPEUTIC EXERCISES: CPT | Performed by: PHYSICAL THERAPIST

## 2025-05-27 NOTE — PROGRESS NOTES
Physical Therapy Daily Treatment Note  [address]  [facility] [address]  P: [phone]  F: [phone]    Patient: Imani Shankar   : 1950  Referring practitioner: Chang Carter MD  Date of Initial Visit: Type: THERAPY  Noted: 2025  Today's Date: 2025  Patient seen for 2 sessions       Visit Diagnoses:    ICD-10-CM ICD-9-CM   1. Radiculopathy, lumbar region  M54.16 724.4   2. Weakness of both lower extremities  R29.898 729.89   3. Decreased functional mobility  R26.89 781.99   4. History of lumbar surgery  Z98.890 V15.29       Imani Shankar reports:     Subjective   Pt reports that her back pain comes and goes and sometimes goes into the front of her right thigh. Pt reports that she wears a back brace during the day while she is standing.   Objective   See Exercise, Manual, and Modality Logs for complete treatment.       Assessment/Plan  Pt tolerated therapeutic interventions well, she had some mild discomfort with standing hip abduction when abducting her right leg. She needed intermittent tactile cueing during this intervention and standing marches at glutes to increase hip extension for upright standing and prevent anterior trunk lean with standing exercises. She tolerated all other exercises well without adverse reactions Reviewed home exercise program and she demonstrates good understanding and compliance with performing. Continue physical therapy per plan of care, continue to progress towards goals.     Timed:         Manual Therapy:    10     mins  94521;     Therapeutic Exercise:    20     mins  11265;     Neuromuscular Shy:    0    mins  58340;    Therapeutic Activity:     10     mins  69375;     Gait Trainin     mins  80782;     Ultrasound:     0     mins  05578;    Ionto                               0    mins   06388  Self Care                       0     mins   99072  Canalith Repos    0     mins 02848      Un-Timed:  Electrical Stimulation:    0      mins  92966 ( );  Dry Needling     0     mins self-pay  Traction     0     mins 64904      Timed Treatment:   40   mins   Total Treatment:     48   mins    Dennise Mayberry, PT  [place] License: [id number]

## 2025-06-10 ENCOUNTER — TREATMENT (OUTPATIENT)
Age: 75
End: 2025-06-10
Payer: MEDICARE

## 2025-06-10 DIAGNOSIS — Z98.890 HISTORY OF LUMBAR SURGERY: ICD-10-CM

## 2025-06-10 DIAGNOSIS — R26.89 DECREASED FUNCTIONAL MOBILITY: ICD-10-CM

## 2025-06-10 DIAGNOSIS — M54.16 LUMBAR RADICULOPATHY: ICD-10-CM

## 2025-06-10 DIAGNOSIS — R29.898 WEAKNESS OF BOTH LOWER EXTREMITIES: ICD-10-CM

## 2025-06-10 DIAGNOSIS — M54.16 RADICULOPATHY, LUMBAR REGION: Primary | ICD-10-CM

## 2025-06-10 PROCEDURE — 97110 THERAPEUTIC EXERCISES: CPT | Performed by: PHYSICAL THERAPIST

## 2025-06-10 PROCEDURE — 97140 MANUAL THERAPY 1/> REGIONS: CPT | Performed by: PHYSICAL THERAPIST

## 2025-06-10 PROCEDURE — 97530 THERAPEUTIC ACTIVITIES: CPT | Performed by: PHYSICAL THERAPIST

## 2025-06-10 RX ORDER — GABAPENTIN 300 MG/1
300 CAPSULE ORAL 3 TIMES DAILY
Qty: 90 CAPSULE | Refills: 0 | Status: SHIPPED | OUTPATIENT
Start: 2025-06-10

## 2025-06-10 NOTE — PROGRESS NOTES
"Physical Therapy Daily Treatment Note  [address]  [facility] [address]  P: [phone]  F: [phone]    Patient: Imani Shankar   : 1950  Referring practitioner: rFanklyn Terrazas MD  Date of Initial Visit: Type: THERAPY  Noted: 2025  Today's Date: 6/10/2025  Patient seen for 3 sessions       Visit Diagnoses:    ICD-10-CM ICD-9-CM   1. Radiculopathy, lumbar region  M54.16 724.4   2. Weakness of both lower extremities  R29.898 729.89   3. Decreased functional mobility  R26.89 781.99   4. History of lumbar surgery  Z98.890 V15.29       Imani Shankar reports:     Subjective   Pt reports that her back was \"ok\" but that she doesn't know what happened, but yesterday she woke up and she had pain in the front of her right thigh. Denies pain past her knee. She reports she took some ibuprofen yesterday evening and it took away her pain completely. Pt denies any thigh pain today, reports just low back pain. (3/10 pain)  Objective   See Exercise, Manual, and Modality Logs for complete treatment.       Assessment/Plan  Needs tactile cueing at low back to improve posterior pelvic tilt and increase activation, does well with cue and added PPT with hip adductor ball squeeze to improve abdominal strength, did very well with this cue. Remains compliant with her home exercises and I updated her HEP today to include s/l clamshells, SIJ isometric exercise, and I updated resistance for bent knee fall out. Issued lime resistance band for home use to progress her exercise. She continues to benefit from manual therapy to improve soft tissue mobility and decrease pain of piriformis and glute and pt reports relief with soft tissue mobilization. Continues to benefit from PT to decrease low back pain and improve lower extremity strength. Continue PT per plan of care.     Timed:         Manual Therapy:    10     mins  58568;     Therapeutic Exercise:    20     mins  41091;     Neuromuscular Shy:    0    mins "  80207;    Therapeutic Activity:     10     mins  29700;     Gait Trainin     mins  10380;     Ultrasound:     0     mins  79982;    Ionto                               0    mins   07571  Self Care                       0     mins   40929  Canalith Repos    0     mins 92952      Un-Timed:  Electrical Stimulation:    0     mins  17585 ( );  Dry Needling     0     mins self-pay  Traction     0     mins 94841      Timed Treatment:   40   mins   Total Treatment:     50   mins    Dennise Mayberry, PT  [place] License: [id number]

## 2025-06-16 ENCOUNTER — TREATMENT (OUTPATIENT)
Age: 75
End: 2025-06-16
Payer: MEDICARE

## 2025-06-16 DIAGNOSIS — Z98.890 HISTORY OF LUMBAR SURGERY: ICD-10-CM

## 2025-06-16 DIAGNOSIS — R29.898 WEAKNESS OF BOTH LOWER EXTREMITIES: ICD-10-CM

## 2025-06-16 DIAGNOSIS — R26.89 DECREASED FUNCTIONAL MOBILITY: ICD-10-CM

## 2025-06-16 DIAGNOSIS — M54.16 RADICULOPATHY, LUMBAR REGION: Primary | ICD-10-CM

## 2025-06-16 PROCEDURE — 97140 MANUAL THERAPY 1/> REGIONS: CPT | Performed by: PHYSICAL THERAPIST

## 2025-06-16 PROCEDURE — 97110 THERAPEUTIC EXERCISES: CPT | Performed by: PHYSICAL THERAPIST

## 2025-06-16 NOTE — PROGRESS NOTES
Physical Therapy Daily Treatment Note  [address]  [facility] [address]  P: [phone]  F: [phone]    Patient: Imani Shankar   : 1950  Referring practitioner: Franklyn Terrazas MD  Date of Initial Visit: Type: THERAPY  Noted: 2025  Today's Date: 2025  Patient seen for 4 sessions       Visit Diagnoses:    ICD-10-CM ICD-9-CM   1. Radiculopathy, lumbar region  M54.16 724.4   2. Weakness of both lower extremities  R29.898 729.89   3. Decreased functional mobility  R26.89 781.99   4. History of lumbar surgery  Z98.890 V15.29       Imani Shankar reports:     Subjective   Pt reports that she is still having the lower back pain and in the front of her right thigh. She reports it is no worse, but no better. Pt reports that she still feels better since the surgery and injection overall, but it is still bothering her walking to where she has to stop. Pt reports she has an appointment in pain medicine tomorrow. Pt reports when she is laying down she feels okay.   Objective   See Exercise, Manual, and Modality Logs for complete treatment.       Assessment/Plan  Pt reports relief with side lying distraction in lumbar roll position with right limb straight and left limb flexed at 90 degrees at hip and knee. She also continues to report relief with massage gun assisted STM to QL and piriformis. I updated her home exercise program to include s/l open books and she tolerated this intervention well without replication of symptoms. She continues to report pain in prolonged standing and walking, she sees her pain medicine doctor tomorrow for follow up. Continue physical therapy per plan of care to decrease her low back pain.     Timed:         Manual Therapy:    12     mins  28714;     Therapeutic Exercise:    10     mins  86810;     Neuromuscular Shy:    0    mins  00979;    Therapeutic Activity:     3     mins  33880;     Gait Trainin     mins  37495;     Ultrasound:     0      mins  14845;    Ionto                               0    mins   26088  Self Care                       0     mins   48834  Canalith Repos    0     mins 21340      Un-Timed:  Electrical Stimulation:    0     mins  04414 ( );  Dry Needling     0     mins self-pay  Traction     0     mins 95405      Timed Treatment:   25   mins   Total Treatment:    35    mins    Dennise Mayberry, PT  [place] License: [id number]

## 2025-06-17 ENCOUNTER — OFFICE VISIT (OUTPATIENT)
Dept: PAIN MEDICINE | Facility: CLINIC | Age: 75
End: 2025-06-17
Payer: MEDICARE

## 2025-06-17 ENCOUNTER — PREP FOR SURGERY (OUTPATIENT)
Dept: SURGERY | Facility: SURGERY CENTER | Age: 75
End: 2025-06-17
Payer: MEDICARE

## 2025-06-17 ENCOUNTER — PREP FOR SURGERY (OUTPATIENT)
Dept: OTHER | Facility: HOSPITAL | Age: 75
End: 2025-06-17
Payer: MEDICARE

## 2025-06-17 VITALS
DIASTOLIC BLOOD PRESSURE: 78 MMHG | OXYGEN SATURATION: 98 % | RESPIRATION RATE: 18 BRPM | HEIGHT: 61 IN | TEMPERATURE: 98.6 F | BODY MASS INDEX: 28.47 KG/M2 | WEIGHT: 150.8 LBS | SYSTOLIC BLOOD PRESSURE: 133 MMHG | HEART RATE: 67 BPM

## 2025-06-17 DIAGNOSIS — M54.16 SPINAL STENOSIS OF LUMBAR REGION WITH RADICULOPATHY: Primary | ICD-10-CM

## 2025-06-17 DIAGNOSIS — M48.061 SPINAL STENOSIS OF LUMBAR REGION WITH RADICULOPATHY: Primary | ICD-10-CM

## 2025-06-17 DIAGNOSIS — M53.3 SACROILIAC JOINT DYSFUNCTION OF BOTH SIDES: ICD-10-CM

## 2025-06-17 LAB
POC AMPHETAMINES: NEGATIVE
POC BARBITURATES: NEGATIVE
POC BENZODIAZEPHINES: NEGATIVE
POC BUPRENORPHINE: NEGATIVE
POC COCAINE: NEGATIVE
POC METHADONE: NEGATIVE
POC METHAMPHETAMINE SCREEN URINE: NEGATIVE
POC OPIATES: NEGATIVE
POC OXYCODONE: NEGATIVE
POC PHENCYCLIDINE: NEGATIVE
POC PROPOXYPHENE: NEGATIVE
POC THC: NEGATIVE

## 2025-06-17 NOTE — PROGRESS NOTES
CHIEF COMPLAINT  Follow-up for back pain.    Subjective   Imani Shankar is a 74 y.o. female  who presents for follow-up.  She has a history of chronic back pain.      Pain today 3/10 VAS (averages 5/10 VAS).  She complains primarily of low back pain, is also beginning to notice return of right anterior/lateral leg pain at this time.  The pain is aggravated primarily by standing and walking and improves with sitting down and rest.  She is taking Gabapentin 300 mg TID which helps some, it also makes her drowsy.  Additionally she utilizes Meloxicam 15 mg daily (PCP), Tizanidine 4 mg PRN, Tylenol PRN.       She has started PT over the past month.  It has helped some, nothing significantly.       Procedures ---  Bilateral SI joint injection on 4/22/2025 --- 25% relief   bilateral L3 LTFESI   on  3/5/2025 --- 70% relief from the procedure x 3 months (leg pain only, did not help back, left leg still ok)     Pertinent surgical history---  12/17/2024 - lumbar laminectomy decompression/discectomy L4/L5 and L5/S1 (Dr. Terrazas)    Back Pain  Chronicity:  Chronic  Frequency:  Daily  Pain location:  Lumbar spine  Radiates to:  Right thigh  Pain-numeric:  3/10  Pain severity:  Moderate  Aggravated by:  Standing, twisting and bending  Associated symptoms: leg pain and numbness (right lower leg)    Associated symptoms: no weakness         PEG Assessment   What number best describes your pain on average in the past week?5  What number best describes how, during the past week, pain has interfered with your enjoyment of life?5  What number best describes how, during the past week, pain has interfered with your general activity?  7    Review of Pertinent Medical Data ---  MRI LUMBAR      The following portions of the patient's history were reviewed and updated as appropriate: allergies, current medications, past family history, past medical history, past social history, past surgical history, and problem list.    Review of Systems  "  Gastrointestinal:  Negative for constipation and diarrhea.   Genitourinary:  Negative for difficulty urinating.   Musculoskeletal:  Positive for back pain.   Neurological:  Positive for numbness (right lower leg). Negative for weakness.   Psychiatric/Behavioral:  Positive for sleep disturbance. Negative for suicidal ideas. The patient is nervous/anxious.        I have reviewed and confirmed the accuracy of the ROS as documented by the MA/LPN/RN Nhung ChangALICIA    Vitals:    06/17/25 1428   BP: 133/78   Pulse: 67   Resp: 18   Temp: 98.6 °F (37 °C)   SpO2: 98%   Weight: 68.4 kg (150 lb 12.8 oz)   Height: 154.9 cm (60.98\")   PainSc: 3    PainLoc: Back     Objective   Physical Exam  Vitals and nursing note reviewed.   Constitutional:       General: She is not in acute distress.     Appearance: Normal appearance. She is not ill-appearing.   Pulmonary:      Effort: Pulmonary effort is normal. No respiratory distress.   Musculoskeletal:      Lumbar back: Tenderness and bony tenderness present. Positive right straight leg raise test and positive left straight leg raise test.   Neurological:      Mental Status: She is alert and oriented to person, place, and time.      Motor: Motor function is intact. No weakness.      Gait: Gait abnormal (slowed).   Psychiatric:         Mood and Affect: Mood normal.         Behavior: Behavior normal.           Assessment & Plan   Diagnoses and all orders for this visit:    1. Spinal stenosis of lumbar region with radiculopathy (Primary)  -     Urine Drug Screen Confirmation - Urine, Clean Catch; Future  -     POC Urine Drug Screen, Triage    2. Sacroiliac joint dysfunction of both sides  -     Urine Drug Screen Confirmation - Urine, Clean Catch; Future  -     POC Urine Drug Screen, Triage      --- LESI (L3/L4 anticipated)    Reviewed the procedure at length with the patient.  Included in the review was expectations, complications, risk and benefits.The procedure was described in " detail and the risks, benefits and alternatives were discussed with the patient (including but not limited to: bleeding, infection, nerve damage, worsening of pain, inability to perform injection, paralysis, seizures, coma, no pain relief and death) who agreed to proceed.  The procedure will plan to be performed at [facility] with fluoroscopic guidance(unless ultrasound is indicated) and could potentially have steroids and contrast dye used. Questions were answered and in a way the patient could understand.  Patient verbalized understanding and wishes to proceed.  This intervention will be ordered.  Discussed with patient that all procedures are part of a multimodal plan of care and include either formal PT or a home exercise program.  Patient has no evidence of coagulopathy or current infection.      --- Continue Gabapentin  --- Routine UDS in office today as part of monitoring requirements for controlled substances.  The specimen was viewed and the immunoassay result reviewed and is NEG.  This specimen will be sent to [facility] laboratory for confirmation.         Imani Shankar reports a pain score of 3.  Given her pain assessment as noted, treatment options were discussed and the following options were decided upon as a follow-up plan to address the patient's pain: continuation of current treatment plan for pain.    --- Follow-up for procedure                  ROBERT REPORT    As the clinician, I personally reviewed the ROBERT from 6/19/2025 while the patient was in the office today.    History and physical exam exhibit continued safe and appropriate use of controlled substances.       Dictated utilizing Dragon dictation.

## 2025-06-17 NOTE — H&P (VIEW-ONLY)
CHIEF COMPLAINT  Follow-up for back pain.    Subjective   Imani Shankar is a 74 y.o. female  who presents for follow-up.  She has a history of chronic back pain.      Pain today 3/10 VAS (averages 5/10 VAS).  She complains primarily of low back pain, is also beginning to notice return of right anterior/lateral leg pain at this time.  The pain is aggravated primarily by standing and walking and improves with sitting down and rest.  She is taking Gabapentin 300 mg TID which helps some, it also makes her drowsy.  Additionally she utilizes Meloxicam 15 mg daily (PCP), Tizanidine 4 mg PRN, Tylenol PRN.       She has started PT over the past month.  It has helped some, nothing significantly.       Procedures ---  Bilateral SI joint injection on 4/22/2025 --- 25% relief   bilateral L3 LTFESI   on  3/5/2025 --- 70% relief from the procedure x 3 months (leg pain only, did not help back, left leg still ok)     Pertinent surgical history---  12/17/2024 - lumbar laminectomy decompression/discectomy L4/L5 and L5/S1 (Dr. Terrazas)    Back Pain  Chronicity:  Chronic  Frequency:  Daily  Pain location:  Lumbar spine  Radiates to:  Right thigh  Pain-numeric:  3/10  Pain severity:  Moderate  Aggravated by:  Standing, twisting and bending  Associated symptoms: leg pain and numbness (right lower leg)    Associated symptoms: no weakness         PEG Assessment   What number best describes your pain on average in the past week?5  What number best describes how, during the past week, pain has interfered with your enjoyment of life?5  What number best describes how, during the past week, pain has interfered with your general activity?  7    Review of Pertinent Medical Data ---  MRI LUMBAR      The following portions of the patient's history were reviewed and updated as appropriate: allergies, current medications, past family history, past medical history, past social history, past surgical history, and problem list.    Review of Systems  "  Gastrointestinal:  Negative for constipation and diarrhea.   Genitourinary:  Negative for difficulty urinating.   Musculoskeletal:  Positive for back pain.   Neurological:  Positive for numbness (right lower leg). Negative for weakness.   Psychiatric/Behavioral:  Positive for sleep disturbance. Negative for suicidal ideas. The patient is nervous/anxious.        I have reviewed and confirmed the accuracy of the ROS as documented by the MA/LPN/RN Nhung ChangALICIA    Vitals:    06/17/25 1428   BP: 133/78   Pulse: 67   Resp: 18   Temp: 98.6 °F (37 °C)   SpO2: 98%   Weight: 68.4 kg (150 lb 12.8 oz)   Height: 154.9 cm (60.98\")   PainSc: 3    PainLoc: Back     Objective   Physical Exam  Vitals and nursing note reviewed.   Constitutional:       General: She is not in acute distress.     Appearance: Normal appearance. She is not ill-appearing.   Pulmonary:      Effort: Pulmonary effort is normal. No respiratory distress.   Musculoskeletal:      Lumbar back: Tenderness and bony tenderness present. Positive right straight leg raise test and positive left straight leg raise test.   Neurological:      Mental Status: She is alert and oriented to person, place, and time.      Motor: Motor function is intact. No weakness.      Gait: Gait abnormal (slowed).   Psychiatric:         Mood and Affect: Mood normal.         Behavior: Behavior normal.           Assessment & Plan   Diagnoses and all orders for this visit:    1. Spinal stenosis of lumbar region with radiculopathy (Primary)  -     Urine Drug Screen Confirmation - Urine, Clean Catch; Future  -     POC Urine Drug Screen, Triage    2. Sacroiliac joint dysfunction of both sides  -     Urine Drug Screen Confirmation - Urine, Clean Catch; Future  -     POC Urine Drug Screen, Triage      --- LESI (L3/L4 anticipated)    Reviewed the procedure at length with the patient.  Included in the review was expectations, complications, risk and benefits.The procedure was described in " detail and the risks, benefits and alternatives were discussed with the patient (including but not limited to: bleeding, infection, nerve damage, worsening of pain, inability to perform injection, paralysis, seizures, coma, no pain relief and death) who agreed to proceed.  The procedure will plan to be performed at [facility] with fluoroscopic guidance(unless ultrasound is indicated) and could potentially have steroids and contrast dye used. Questions were answered and in a way the patient could understand.  Patient verbalized understanding and wishes to proceed.  This intervention will be ordered.  Discussed with patient that all procedures are part of a multimodal plan of care and include either formal PT or a home exercise program.  Patient has no evidence of coagulopathy or current infection.      --- Continue Gabapentin  --- Routine UDS in office today as part of monitoring requirements for controlled substances.  The specimen was viewed and the immunoassay result reviewed and is NEG.  This specimen will be sent to [facility] laboratory for confirmation.         Imani Shankar reports a pain score of 3.  Given her pain assessment as noted, treatment options were discussed and the following options were decided upon as a follow-up plan to address the patient's pain: continuation of current treatment plan for pain.    --- Follow-up for procedure                  ROBERT REPORT    As the clinician, I personally reviewed the ROBERT from 6/19/2025 while the patient was in the office today.    History and physical exam exhibit continued safe and appropriate use of controlled substances.       Dictated utilizing Dragon dictation.

## 2025-06-18 ENCOUNTER — TREATMENT (OUTPATIENT)
Age: 75
End: 2025-06-18
Payer: MEDICARE

## 2025-06-18 DIAGNOSIS — M54.16 RADICULOPATHY, LUMBAR REGION: Primary | ICD-10-CM

## 2025-06-18 DIAGNOSIS — R29.898 WEAKNESS OF BOTH LOWER EXTREMITIES: ICD-10-CM

## 2025-06-18 DIAGNOSIS — Z98.890 HISTORY OF LUMBAR SURGERY: ICD-10-CM

## 2025-06-18 DIAGNOSIS — R26.89 DECREASED FUNCTIONAL MOBILITY: ICD-10-CM

## 2025-06-18 NOTE — PROGRESS NOTES
"Physical Therapy Daily Treatment Note  [address]  [address]  [facility] [address]  P: [phone]  F: [phone]    Patient: Imani Shankar   : 1950  Referring practitioner: Franklyn Terrazas MD  Date of Initial Visit: Type: THERAPY  Noted: 2025  Today's Date: 2025  Patient seen for 5 sessions       Visit Diagnoses:    ICD-10-CM ICD-9-CM   1. Radiculopathy, lumbar region  M54.16 724.4   2. Weakness of both lower extremities  R29.898 729.89   3. Decreased functional mobility  R26.89 781.99   4. History of lumbar surgery  Z98.890 V15.29       Imani Shankar reports:     Subjective   Pt reports that her back is feeling \"okay\" today. Pt reports that she has an epidural on 2025.   Objective   See Exercise, Manual, and Modality Logs for complete treatment.       Assessment/Plan  Pt reports relief in side lying position with distraction, as well as, with massage gun assisted soft tissue mobilization. She has c/o pain with prolonged positioning and benefits from continued manual therapy to provide relief to low back. She is able to perform hook lying interventions with band resistance without adverse reactions and continues to perform her home stretches. Continue PT per plan of care to decrease low back pain.     Timed:         Manual Therapy:    11     mins  26299;     Therapeutic Exercise:    16     mins  17515;     Neuromuscular Shy:    0    mins  86476;    Therapeutic Activity:     0     mins  08263;     Gait Trainin     mins  21646;     Ultrasound:     0     mins  78101;    Ionto                               0    mins   49712  Self Care                       0     mins   42961  Canalith Repos    0     mins 04118      Un-Timed:  Electrical Stimulation:    0     mins  82005 (MC );  Dry Needling     0     mins self-pay  Traction     0     mins 00827      Timed Treatment:   27   mins   Total Treatment:     37   mins    Dennise Mayberry PT  [place] License: " [id number]

## 2025-06-23 ENCOUNTER — TELEPHONE (OUTPATIENT)
Age: 75
End: 2025-06-23

## 2025-06-23 DIAGNOSIS — M54.16 LEFT LUMBAR RADICULOPATHY: ICD-10-CM

## 2025-06-23 RX ORDER — TRAMADOL HYDROCHLORIDE 50 MG/1
50 TABLET ORAL EVERY 6 HOURS
Qty: 120 TABLET | Refills: 0 | Status: SHIPPED | OUTPATIENT
Start: 2025-06-23

## 2025-06-30 ENCOUNTER — TREATMENT (OUTPATIENT)
Age: 75
End: 2025-06-30
Payer: MEDICARE

## 2025-06-30 DIAGNOSIS — R26.89 DECREASED FUNCTIONAL MOBILITY: ICD-10-CM

## 2025-06-30 DIAGNOSIS — Z98.890 HISTORY OF LUMBAR SURGERY: ICD-10-CM

## 2025-06-30 DIAGNOSIS — R29.898 WEAKNESS OF BOTH LOWER EXTREMITIES: ICD-10-CM

## 2025-06-30 DIAGNOSIS — M54.16 RADICULOPATHY, LUMBAR REGION: Primary | ICD-10-CM

## 2025-06-30 PROCEDURE — 97530 THERAPEUTIC ACTIVITIES: CPT | Performed by: PHYSICAL THERAPIST

## 2025-06-30 PROCEDURE — 97110 THERAPEUTIC EXERCISES: CPT | Performed by: PHYSICAL THERAPIST

## 2025-06-30 PROCEDURE — 97140 MANUAL THERAPY 1/> REGIONS: CPT | Performed by: PHYSICAL THERAPIST

## 2025-06-30 NOTE — PROGRESS NOTES
"Physical Therapy Daily Treatment Note  [address]  [facility] [address]  P: [phone]  F: [phone]    Patient: Imani Shankar   : 1950  Referring practitioner: Franklyn Terrazas MD  Date of Initial Visit: Type: THERAPY  Noted: 2025  Today's Date: 2025  Patient seen for 6 sessions       Visit Diagnoses:    ICD-10-CM ICD-9-CM   1. Radiculopathy, lumbar region  M54.16 724.4   2. Weakness of both lower extremities  R29.898 729.89   3. Decreased functional mobility  R26.89 781.99   4. History of lumbar surgery  Z98.890 V15.29       Imani Shankar reports:     Subjective   Pt reports \"sometimes it feels better and sometimes it doesn't\". Pt reports she can't stand or do anything for along time without it bothering her, pt reports that when she sits it goes away. She has an epidural this Wednesday.  Objective   See Exercise, Manual, and Modality Logs for complete treatment.       Assessment/Plan  Progressed her interventions today to include dowel SIJ isometric intervention and she did well with this, requiring only initial verbal cueing to ensure proper exercise performance. No replication of symptoms with this intervention and even able to hold her own leg up without assistance. She reports relief during soft tissue mobilization on piriformis and gluteus medius bilaterally. She was able to perform seated lumbar flexion and standing lumbar extension for 10 reps each without replication of symptoms, I issued her a handout of these interventions for home to continue to improve pain free bending and standing. She has an epidural in a couple of days and she will follow up with me in PT next week. Continue physical therapy per plan of care to decrease low back pain and improve lower extremity strength.     Timed:         Manual Therapy:    11     mins  79696;     Therapeutic Exercise:    20     mins  73420;     Neuromuscular Shy:    0    mins  60498;    Therapeutic Activity:     8     " mins  77095;     Gait Trainin     mins  19882;     Ultrasound:     0     mins  78816;    Ionto                               0    mins   26017  Self Care                       0     mins   43443  Canalith Repos    0     mins 02789      Un-Timed:  Electrical Stimulation:    0     mins  17367 ( );  Dry Needling     0     mins self-pay  Traction     0     mins 34538      Timed Treatment:   39   mins   Total Treatment:     49   mins    Dennise Mayberry, PT  [place] License: [id number]

## 2025-07-01 ENCOUNTER — PREP FOR SURGERY (OUTPATIENT)
Dept: OTHER | Facility: HOSPITAL | Age: 75
End: 2025-07-01
Payer: MEDICARE

## 2025-07-01 DIAGNOSIS — M54.16 BILATERAL LUMBAR RADICULOPATHY: Primary | ICD-10-CM

## 2025-07-01 RX ORDER — METHYLPREDNISOLONE ACETATE 80 MG/ML
80 INJECTION, SUSPENSION INTRA-ARTICULAR; INTRALESIONAL; INTRAMUSCULAR; SOFT TISSUE ONCE
Status: CANCELLED | OUTPATIENT
Start: 2025-07-02 | End: 2025-07-02

## 2025-07-01 RX ORDER — BUPIVACAINE HYDROCHLORIDE 2.5 MG/ML
10 INJECTION, SOLUTION EPIDURAL; INFILTRATION; INTRACAUDAL; PERINEURAL ONCE
Status: CANCELLED | OUTPATIENT
Start: 2025-07-02 | End: 2025-07-02

## 2025-07-01 RX ORDER — IOPAMIDOL 408 MG/ML
12 INJECTION, SOLUTION INTRATHECAL
Status: CANCELLED | OUTPATIENT
Start: 2025-07-02 | End: 2025-07-02

## 2025-07-01 RX ORDER — LIDOCAINE HYDROCHLORIDE 10 MG/ML
5 INJECTION, SOLUTION INFILTRATION; PERINEURAL ONCE
Status: CANCELLED | OUTPATIENT
Start: 2025-07-02 | End: 2025-07-02

## 2025-07-02 ENCOUNTER — HOSPITAL ENCOUNTER (OUTPATIENT)
Dept: PAIN MEDICINE | Facility: HOSPITAL | Age: 75
Discharge: HOME | End: 2025-07-02
Payer: MEDICARE

## 2025-07-02 ENCOUNTER — HOSPITAL ENCOUNTER (OUTPATIENT)
Dept: GENERAL RADIOLOGY | Facility: HOSPITAL | Age: 75
Discharge: HOME | End: 2025-07-02
Payer: MEDICARE

## 2025-07-02 VITALS
TEMPERATURE: 97.1 F | SYSTOLIC BLOOD PRESSURE: 127 MMHG | HEART RATE: 57 BPM | DIASTOLIC BLOOD PRESSURE: 64 MMHG | RESPIRATION RATE: 16 BRPM | OXYGEN SATURATION: 97 %

## 2025-07-02 DIAGNOSIS — M54.16 BILATERAL LUMBAR RADICULOPATHY: ICD-10-CM

## 2025-07-02 DIAGNOSIS — R52 PAIN: ICD-10-CM

## 2025-07-02 PROCEDURE — 63710000001 DIAZEPAM 5 MG TABLET: Performed by: ANESTHESIOLOGY

## 2025-07-02 PROCEDURE — A9270 NON-COVERED ITEM OR SERVICE: HCPCS | Performed by: ANESTHESIOLOGY

## 2025-07-02 PROCEDURE — 25010000002 BUPIVACAINE (PF) 0.25 % SOLUTION: Performed by: ANESTHESIOLOGY

## 2025-07-02 PROCEDURE — 77003 FLUOROGUIDE FOR SPINE INJECT: CPT

## 2025-07-02 PROCEDURE — 25010000002 METHYLPREDNISOLONE PER 80 MG: Performed by: ANESTHESIOLOGY

## 2025-07-02 PROCEDURE — 25010000002 LIDOCAINE PF 1% 1 % SOLUTION: Performed by: ANESTHESIOLOGY

## 2025-07-02 PROCEDURE — 25510000001 IOPAMIDOL 41 % SOLUTION: Performed by: ANESTHESIOLOGY

## 2025-07-02 RX ORDER — BUPIVACAINE HYDROCHLORIDE 2.5 MG/ML
10 INJECTION, SOLUTION EPIDURAL; INFILTRATION; INTRACAUDAL; PERINEURAL ONCE
Status: COMPLETED | OUTPATIENT
Start: 2025-07-02 | End: 2025-07-02

## 2025-07-02 RX ORDER — IOPAMIDOL 408 MG/ML
12 INJECTION, SOLUTION INTRATHECAL
Status: COMPLETED | OUTPATIENT
Start: 2025-07-02 | End: 2025-07-02

## 2025-07-02 RX ORDER — DIAZEPAM 5 MG/1
10 TABLET ORAL ONCE
Status: COMPLETED | OUTPATIENT
Start: 2025-07-02 | End: 2025-07-02

## 2025-07-02 RX ORDER — METHYLPREDNISOLONE ACETATE 80 MG/ML
80 INJECTION, SUSPENSION INTRA-ARTICULAR; INTRALESIONAL; INTRAMUSCULAR; SOFT TISSUE ONCE
Status: COMPLETED | OUTPATIENT
Start: 2025-07-02 | End: 2025-07-02

## 2025-07-02 RX ORDER — LIDOCAINE HYDROCHLORIDE 10 MG/ML
5 INJECTION, SOLUTION INFILTRATION; PERINEURAL ONCE
Status: COMPLETED | OUTPATIENT
Start: 2025-07-02 | End: 2025-07-02

## 2025-07-02 RX ADMIN — DIAZEPAM 10 MG: 5 TABLET ORAL at 09:56

## 2025-07-02 RX ADMIN — LIDOCAINE HYDROCHLORIDE 5 ML: 10 INJECTION, SOLUTION EPIDURAL; INFILTRATION; INTRACAUDAL; PERINEURAL at 10:38

## 2025-07-02 RX ADMIN — BUPIVACAINE HYDROCHLORIDE 10 ML: 2.5 INJECTION, SOLUTION EPIDURAL; INFILTRATION; INTRACAUDAL; PERINEURAL at 10:38

## 2025-07-02 RX ADMIN — METHYLPREDNISOLONE ACETATE 80 MG: 80 INJECTION, SUSPENSION INTRA-ARTICULAR; INTRALESIONAL; INTRAMUSCULAR; SOFT TISSUE at 10:38

## 2025-07-02 RX ADMIN — IOPAMIDOL 10 ML: 408 INJECTION, SOLUTION INTRATHECAL at 10:38

## 2025-07-02 NOTE — DISCHARGE INSTRUCTIONS
[facility] - Post-procedure Instructions          --  While there are no absolute restrictions, it is recommended that you do not perform strenuous activity today. In the morning, you may resume your level of activity as before your block.    --  If you have a band-aid at your injection site, please remove it later today. Observe the area for any redness, swelling, pus-like drainage, or a temperature over 101°. If any of these symptoms occur, please call your doctor at [phone]. If after office hours, leave a message and the on-call provider will return your call.    --  Ice may be applied to your injection site. It is recommended you avoid direct heat (heating pad; hot tub) for 1-2 days.    --  Call [facility] at [phone] if you experience persistent headache, persistent bleeding from the injection site, or severe pain not relieved by heat or oral medication.    --  Do not make important decisions today.    --  Due to the effects of the block and/or the I.V. Sedation, DO NOT drive or operate hazardous machinery for 12 hours.  Local anesthetics may cause numbness after procedure and precautions must be taken with regards to operating equipment as well as with walking, even if ambulating with assistance of another person or with an assistive device.    --  Do not drink alcohol for 12 hours.    -- You may return to work tomorrow, or as directed by your referring doctor.    --  Occasionally you may notice a slight increase in your pain after the procedure. This should start to improve within the next 24-48 hours. Radiofrequency ablation procedure pain may last 3-4 weeks.    --  It may take as long as 3-4 days before you notice a gradual improvement in your pain and/or other symptoms.    -- You may continue to take your prescribed pain medication as needed.    --  Some normal possible side effects of steroid use could include fluid retention, increased blood sugar, dull headache,  increased sweating, increased appetite, mood swings and flushing.    --  Diabetics are recommended to watch their blood glucose level closely for 24-48 hours after the injection.    --  Must stay in PACU for 20 min upon arrival and prove no leg weakness before being discharged.    --  IN THE EVENT OF A LIFE THREATENING EMERGENCY, (CHEST PAIN, BREATHING DIFFICULTIES, PARALYSIS…) YOU SHOULD GO TO YOUR NEAREST EMERGENCY ROOM.    --  You should be contacted by our office within 2-3 days to schedule follow up or next appointment date.  If not contacted within 7 days, please call the office at [phone]

## 2025-07-02 NOTE — PROCEDURES
Procedures    Lumbar Epidural Steroid Injection  [facility]    PREOPERATIVE DIAGNOSIS:   Lumbar Spinal Stenosis unspecified regarding Neurogenic Claudication, Lumbar Postlaminectomy Syndrome, and Lumbar Radiculopathy  POSTOPERATIVE DIAGNOSIS:  Same as preop diagnosis    PROCEDURE:   Lumbar Epidural Steroid Injection, Therapeutic Translaminar Injection, with epidurogram, at  L3/L4 level    PRE-PROCEDURE DISCUSSION WITH PATIENT:    Risks and complications were discussed with the patient prior to starting the procedure and informed consent was obtained.  We discussed various topics including but not limited to bleeding, infection, injury, paralysis, nerve injury, dural puncture, coma, death, worsening of clinical picture, lack of pain relief, and postprocedural soreness.    Preprocedure assessment/presedation assessment is noted as the H&P/H&P update as part of this Epic chart encounter.  Preassessment plan and preprocedure airway assessment are noted.  Immediate in the room airway assessment is unchanged from the preprocedure assessment performed in the preoperative area a few minutes ago.      SURGEON:  Raymond Cheek MD    REASON FOR PROCEDURE:    Previous clinically significant therapeutic effect is noted., Degenerative changes are noted in the area., and Radiating pattern of pain is likely consistent with degenerative changes in the area.    SEDATION:  The patient had higher than average levels of procedural anxiety and the need to provide additional procedural sedation was needed to safely proceed. and diazepam 10mg oral dose was given in the preop area  ANESTHETIC:  Marcaine 0.25%  STEROID:   Methylprednisolone (DEPO MEDROL) 80mg/ml    DESCRIPTON OF PROCEDURE:    After obtaining informed consent, I.V. was not started in the preop area.   The patient was taken to the operating room and placed in the prone position.  EKG, blood pressure, and pulse oximeter were monitored throughout, and sedation was  provided as needed by the RN under my guidance. All pressure points were well padded.  The lumbar spine area was prepped with Chloraprep and draped in a sterile fashion.  Under fluoroscopic guidance, the above mentioned interlaminar space was identified. Skin and subcutaneous tissues were anesthetized with 1% lidocaine in the middle of the space. A Tuohy needle was introduced through the skin and advanced to this interlaminar space and into the epidural space under fluoroscopic guidance and verified with loss-of-resistance technique to air.  After confirming the position of the needle with the fluoroscope with all the views, and after aspiration was confirmed negative for blood and CSF, 1.5 mL of Omnipaque was injected.  After seeing appropriate epidurogram with lateral and PA views, a total of 3 cc solution was injected, consisting of 1cc of local anesthetic as above, with normal saline and injectable steroid as above.     ESTIMATED BLOOD LOSS:  <5 mL  SPECIMENS:  None    COMPLICATIONS:     No complications were noted., There was no indication of vascular uptake on live injection of contrast dye., There was no indication of intrathecal uptake on live injection of contrast dye., There was not any evidence of dural puncture.  , and The patient did not have any signs of postprocedure numbness nor weakness.    TOLERANCE & DISCHARGE CONDITION:    The patient tolerated the procedure well.  The patient was transported to the recovery area without difficulties.  The patient was discharged to home under the care of family in stable and satisfactory condition.    PLAN OF CARE:  The patient was given our standard instruction sheet.  The patient will Return to clinic 4-6 wks  The patient will resume all medications as per the medication reconciliation sheet.

## 2025-07-03 RX ORDER — OMEPRAZOLE 40 MG/1
40 CAPSULE, DELAYED RELEASE ORAL DAILY
Qty: 90 CAPSULE | Refills: 3 | Status: SHIPPED | OUTPATIENT
Start: 2025-07-03

## 2025-07-14 ENCOUNTER — TREATMENT (OUTPATIENT)
Age: 75
End: 2025-07-14
Payer: MEDICARE

## 2025-07-14 DIAGNOSIS — R26.89 DECREASED FUNCTIONAL MOBILITY: ICD-10-CM

## 2025-07-14 DIAGNOSIS — M54.16 RADICULOPATHY, LUMBAR REGION: Primary | ICD-10-CM

## 2025-07-14 DIAGNOSIS — R29.898 WEAKNESS OF BOTH LOWER EXTREMITIES: ICD-10-CM

## 2025-07-14 DIAGNOSIS — Z98.890 HISTORY OF LUMBAR SURGERY: ICD-10-CM

## 2025-07-14 PROCEDURE — 97530 THERAPEUTIC ACTIVITIES: CPT | Performed by: PHYSICAL THERAPIST

## 2025-07-14 PROCEDURE — 97110 THERAPEUTIC EXERCISES: CPT | Performed by: PHYSICAL THERAPIST

## 2025-07-14 PROCEDURE — 97140 MANUAL THERAPY 1/> REGIONS: CPT | Performed by: PHYSICAL THERAPIST

## 2025-07-14 NOTE — PROGRESS NOTES
Physical Therapy Daily Treatment Note    [facility]  [address]  [address]  [place] [address]     Patient: Imani Shankar   : 1950  Referring practitioner: Franklyn Terrazas MD  Date of Initial Visit: Type: THERAPY  Noted: 2025  Today's Date: 2025  Patient seen for 7 sessions         Imani Shankar reports: That she seemed to have gotten some relief from recent epidural injections in regards to low back and lower extremity.        Subjective     Objective   See Exercise, Manual, and Modality Logs for complete treatment.   Exercise rationale/ pain free exercise performance  Anatomy and structure of affected musculature  Posture/Postural awareness  Lumbar support  Sleeping positions with pillows  Alternate exercise positions  Verbal/Tactile cues to ensure correct exercise performance/technique    Added:    Sciatic nerve glides  R hip flexor/quad stretch over table with strap        Assessment/Plan Compliant/Cooperative with rehab efforts.  Subjectively reports no increased symptoms or discomfort with therapeutic exercise today.  Feels that she got some relief recently from her epidural injection.  Able to perform increased exercise/functional activity without increased LBP.  Benefits from verbal/tactile cues to ensure proper exercise positioning, technique and performance as well as education regarding condition and strategies to improve posture, sleep and ambulation..            Progress per Plan of Care and Progress strengthening /stabilization /functional activity           Timed:  Manual Therapy:    12     mins  81706;  Therapeutic Exercise:   26      mins  07541;     Neuromuscular Shy:        mins  03583;    Therapeutic Activity:     8     mins  89813;     Gait Training:           mins  20734;     Ultrasound:          mins  68488;    Self Care                    _12__      mins 88811    Untimed:  Electrical Stimulation:         mins  71096 ( );  Mechanical  Traction:         mins  07869;     Timed Treatment: 58     mins   Total Treatment:    58    mins  Hector Meadows, KYLE  Physical Therapist  Assistant  [id number]

## 2025-07-18 ENCOUNTER — TREATMENT (OUTPATIENT)
Age: 75
End: 2025-07-18
Payer: MEDICARE

## 2025-07-18 DIAGNOSIS — R29.898 WEAKNESS OF BOTH LOWER EXTREMITIES: ICD-10-CM

## 2025-07-18 DIAGNOSIS — M54.16 RADICULOPATHY, LUMBAR REGION: Primary | ICD-10-CM

## 2025-07-18 DIAGNOSIS — Z98.890 HISTORY OF LUMBAR SURGERY: ICD-10-CM

## 2025-07-18 DIAGNOSIS — R26.89 DECREASED FUNCTIONAL MOBILITY: ICD-10-CM

## 2025-07-18 NOTE — PROGRESS NOTES
"Re-Assessment / Progress Note  [address]  [address]  [facility] [address]  P: [phone]  F: [phone]  Patient: Imani Shankar   : 1950  Diagnosis/ICD-10 Code:  Radiculopathy, lumbar region [M54.16]  Referring practitioner: Franklyn Terrazas MD  Date of Initial Visit: Episode Type: THERAPY  Noted: 2025    Today's Date: 2025  Patient seen for 8 sessions.    Visit Diagnoses:    ICD-10-CM ICD-9-CM   1. Radiculopathy, lumbar region  M54.16 724.4   2. Weakness of both lower extremities  R29.898 729.89   3. Decreased functional mobility  R26.89 781.99   4. History of lumbar surgery  Z98.890 V15.29       Subjective:   Imani Shankar reports:     Subjective Questionnaire: Oswestry: 50%  Clinical Progress: improving  Home Program Compliance: Yes  Treatment has included: therapeutic exercise, neuromuscular re-education, manual therapy, therapeutic activity, gait training, moist heat, and cryotherapy    Subjective   Pt reports \"I am doing better\". Pt reports she thinks the injection in combination with the exercises have helped. Pt reports she no longer has pain in the back, onl sometimes in the front of the right leg. Pt reports she feels 70% better.   Objective   Active Range of Motion      Lumbar   Flexion: WFL and with slight pain  Extension: 25 degrees   Right lateral flexion: WFL   Left rotation: WFL  Right rotation: WFL     Additional Active Range of Motion Details  Number denotes percent limitation     Strength/Myotome Testing      Left Hip   Planes of Motion   Flexion: 4+  Extension: 3+  Abduction: 3+  Adduction: 4     Right Hip   Planes of Motion   Flexion: 4+  Extension: 3+  Abduction: 3+  Adduction: 4     Left Knee   Flexion: 5  Extension: 5     Right Knee   Flexion: 5  Extension: 5     Left Ankle/Foot   Dorsiflexion: 5  Plantar flexion: 5     Right Ankle/Foot   Dorsiflexion: 5  Plantar flexion: 5     Additional Strength Details  Pt is able to perform supine to long sit position 1, " with compensation from lower extremities (abdominal functional strength test) 07/18: unable to perform        Assessment/Plan  Pt is a 75 y/o female who has been attending physical therapy for 8 sessions for lumbar radiculopathy. She has met 3/4 of her short term goals since initial evaluation and is progressing towards all of her long term goals. She can now have pain free lumbar rotation and side bending and has improved in hip abductor strength since her first visit. She does still need improvement in her core strength, as well as, continued improvement in her hip strength to help protect her low back during lifting tasks. She reports some mild midline low back pain when lifting 5 lb from the floor and needs verbal cueing for proper lifting mechanics. Her pain level has reduced significantly since first visit and reports 70% improvement overall with injection and PT. She requires additional skilled PT in order to continue to address the above impairments and activity limitations outlined in this re-evaluation in order to decrease pain and improve functional strength/mobility.   Progress toward previous goals: Partially Met    Goals  Plan Goals: STG 2-4 weeks     1. Pt will be independent in home exercise program MET  2. Pt will be able to improve lumbar rotation and side bending to pain free in order to be able to turn her body and  a bag of groceries without pain MET  3. WEN score will be improved to <40% to demonstrate subjective improvement in ADLs/IADls progressing  4. Pt will improve B hip abductor strength to 3+ or greater to demonstrate an improvement in hip strength MET     LTG 4-8 weeks     1. Pt will be able to ambulate 750 feet without c/o low back pain progressing  2. Pt will be able to  a 5 lbs from the floor pain free and with good lifting mechanics progressing  3. Pt will be able to lift 5 lbs from waist to over shoulder height 5/5 times pain free in order to improve lifting mechanics  progressing  4. WEN score will be improved to <20% to demonstrate subjective improvement in ADLs/IADls progressing  5. Pt will have pain free lumbar flexion and extension range of motion progressing  6. Pt will demonstrate an improvement in BLE strength for hips to 4+/5 or greater progressing    Recommendations: Continue as planned  Timeframe: 1 month  Prognosis to achieve goals: good    PT Signature: Dennise Shawanda, PT  [place] Lic. # [id number]      Based upon review of the patient's progress and continued therapy plan, it is my medical opinion that Imani Shankar should continue physical therapy treatment at [facility] PHYSICAL THERAPY  [address]  [facility] [address] [phone] ; Fax Number [phone]        Manual Therapy:     10     mins  12443;  Therapeutic Exercise:     15     mins  18606;     Neuromuscular Shy:     0    mins  08961;    Therapeutic Activity:      0     mins  21906;     Gait Trainin     mins  31706;     Ultrasound:      0     mins  14934;    Electrical Stimulation:     0     mins  01564 ( );  Dry Needling      0     mins self-pay  Traction      0     mins 19545  Canalith Repositioning    0     mins 01484        Timed Treatment:   25   mins   Total Treatment:     35   mins

## 2025-07-20 DIAGNOSIS — Z76.0 REPEAT PRESCRIPTION ISSUE: ICD-10-CM

## 2025-07-21 ENCOUNTER — TREATMENT (OUTPATIENT)
Age: 75
End: 2025-07-21
Payer: MEDICARE

## 2025-07-21 DIAGNOSIS — Z98.890 HISTORY OF LUMBAR SURGERY: ICD-10-CM

## 2025-07-21 DIAGNOSIS — R29.898 WEAKNESS OF BOTH LOWER EXTREMITIES: ICD-10-CM

## 2025-07-21 DIAGNOSIS — R26.89 DECREASED FUNCTIONAL MOBILITY: ICD-10-CM

## 2025-07-21 DIAGNOSIS — M54.16 RADICULOPATHY, LUMBAR REGION: Primary | ICD-10-CM

## 2025-07-21 PROCEDURE — 97140 MANUAL THERAPY 1/> REGIONS: CPT | Performed by: PHYSICAL THERAPIST

## 2025-07-21 PROCEDURE — 97110 THERAPEUTIC EXERCISES: CPT | Performed by: PHYSICAL THERAPIST

## 2025-07-21 PROCEDURE — 97530 THERAPEUTIC ACTIVITIES: CPT | Performed by: PHYSICAL THERAPIST

## 2025-07-21 RX ORDER — LEVOTHYROXINE SODIUM 100 UG/1
100 TABLET ORAL DAILY
Qty: 90 TABLET | Refills: 3 | Status: SHIPPED | OUTPATIENT
Start: 2025-07-21

## 2025-07-21 NOTE — PROGRESS NOTES
"Physical Therapy Daily Treatment Note  [address]  [facility] [address]  P: [phone]  F: [phone]    Patient: Imani Shankar   : 1950  Referring practitioner: Franklyn Terrazas MD  Date of Initial Visit: Type: THERAPY  Noted: 2025  Today's Date: 2025  Patient seen for 9 sessions       Visit Diagnoses:    ICD-10-CM ICD-9-CM   1. Radiculopathy, lumbar region  M54.16 724.4   2. Weakness of both lower extremities  R29.898 729.89   3. Decreased functional mobility  R26.89 781.99   4. History of lumbar surgery  Z98.890 V15.29       Imani Shankar reports:     Subjective   Pt reports that her back is doing \"good\", but \"the front of my leg is still tingling today\" \"but back is much better\".   Objective   See Exercise, Manual, and Modality Logs for complete treatment.       Assessment/Plan  Needs verbal cueing to increase base of support during lifting activity. Pt needs improved base of support, as well as, needs improvement in her overall lifting mechanics. Pt bends from her back instead of bending with her knees and then bending to  a weight. Used 2 lb weight  to start needs improvement in her lifting mechanics to protect her low back and to improve strength during her ADLs. Pt also benefits from t/c at hips to increase gluteal activation. Continue physical therapy per plan of care to decrease back pain and improve LE strength. Continue to progress strengthening and incorporate lifting activities.    Timed:         Manual Therapy:    11     mins  25319;     Therapeutic Exercise:    19     mins  38724;     Neuromuscular Shy:    0    mins  25193;    Therapeutic Activity:     10     mins  01434;     Gait Trainin     mins  69755;     Ultrasound:     0     mins  70873;    Ionto                               0    mins   79931  Self Care                       0     mins   41798  Canalith Repos    0     mins 43413      Un-Timed:  Electrical Stimulation:    0     mins  " 67080 ( );  Dry Needling     0     mins self-pay  Traction     0     mins 67202      Timed Treatment:   40   mins   Total Treatment:     50   mins    Dennise Mayberry, PT  [place] License: [id number]

## 2025-07-22 ENCOUNTER — TELEPHONE (OUTPATIENT)
Dept: PAIN MEDICINE | Facility: CLINIC | Age: 75
End: 2025-07-22
Payer: MEDICARE

## 2025-07-22 NOTE — TELEPHONE ENCOUNTER
Caller: Imani Shankar    Relationship to patient: Self    Best call back number: [phone]    Chief complaint: LUMBAR TINGLING    Type of visit: FOLLOW UP     Requested date: ASAP      Additional notes:REQUESTING TO SEE

## 2025-07-23 NOTE — TELEPHONE ENCOUNTER
Called patient back and she states she started getting some tingling down her right leg after her epidural on the second. Patient stated that she called us on the next Saturday morning and someone called her back and told her it was normal.  Patient called us back yesterday to tell us its still tingling down her leg and wants to know what to do.

## 2025-07-25 RX ORDER — METOPROLOL SUCCINATE 50 MG/1
50 TABLET, EXTENDED RELEASE ORAL DAILY
Qty: 90 TABLET | Refills: 3 | Status: SHIPPED | OUTPATIENT
Start: 2025-07-25

## 2025-07-28 ENCOUNTER — TREATMENT (OUTPATIENT)
Age: 75
End: 2025-07-28
Payer: MEDICARE

## 2025-07-28 DIAGNOSIS — R29.898 WEAKNESS OF BOTH LOWER EXTREMITIES: ICD-10-CM

## 2025-07-28 DIAGNOSIS — R26.89 DECREASED FUNCTIONAL MOBILITY: ICD-10-CM

## 2025-07-28 DIAGNOSIS — Z98.890 HISTORY OF LUMBAR SURGERY: ICD-10-CM

## 2025-07-28 DIAGNOSIS — M54.16 RADICULOPATHY, LUMBAR REGION: Primary | ICD-10-CM

## 2025-07-28 PROCEDURE — 97110 THERAPEUTIC EXERCISES: CPT | Performed by: PHYSICAL THERAPIST

## 2025-07-28 PROCEDURE — 97140 MANUAL THERAPY 1/> REGIONS: CPT | Performed by: PHYSICAL THERAPIST

## 2025-07-28 NOTE — PROGRESS NOTES
"Physical Therapy Daily Treatment Note  [address]  [facility] [address]  P: [phone]  F: [phone]    Patient: Imani Shankar   : 1950  Referring practitioner: Franklyn Trerazas MD  Date of Initial Visit: Type: THERAPY  Noted: 2025  Today's Date: 2025  Patient seen for 10 sessions       Visit Diagnoses:    ICD-10-CM ICD-9-CM   1. Radiculopathy, lumbar region  M54.16 724.4   2. Weakness of both lower extremities  R29.898 729.89   3. Decreased functional mobility  R26.89 781.99   4. History of lumbar surgery  Z98.890 V15.29       Imani Shankar reports:     Subjective   Pt reports that her tingling in the front of her right leg has gotten worse and that her back pain is worse than it was before the epidural. Pt reports she called her doctor's office and they haven't got back with her yet, but plans to call them again today.   Objective   See Exercise, Manual, and Modality Logs for complete treatment.       Assessment/Plan  Pt had significant tenderness to right sided lumbar paraspinals today, as well as, right sided glutes. In addition, had significantly pain when side lying on left side and right hip flexed, no pain when both knees slightly bent in side lying position. Reports \"it feels so good\" during massage gun assisted soft tissue mobilization to piriformis. No replication of right anterior thigh tingling during therapeutic interventions. Due to increase in her lower extremity tingling due to her ADLs and increase in low back pain when standing and walking, pt will wait to have further PT until she sees her doctor. I instructed her to continue to do her home exercises as long as they are not replicating or worsening her symptoms and she voices understanding.  Plan: hold further formal PT until further MD assessment, will resume further PT if recommended by MD after further assessment. Pt sees Neuro MD on 2025.     Timed:         Manual Therapy:    14     mins  " 22873;     Therapeutic Exercise:    8     mins  12852;     Neuromuscular Shy:    0    mins  55126;    Therapeutic Activity:     2     mins  00036;     Gait Trainin     mins  98805;     Ultrasound:     0     mins  80696;    Ionto                               0    mins   43546  Self Care                       0     mins   66035  Canalith Repos    0     mins 87171      Un-Timed:  Electrical Stimulation:    0     mins  78602 ( );  Dry Needling     0     mins self-pay  Traction     0     mins 87532      Timed Treatment:   24   mins   Total Treatment:     34   mins    Dennise Mayberry PT  [place] License: [id number]

## 2025-07-29 ENCOUNTER — TELEPHONE (OUTPATIENT)
Dept: GASTROENTEROLOGY | Facility: CLINIC | Age: 75
End: 2025-07-29
Payer: MEDICARE

## 2025-07-29 NOTE — TELEPHONE ENCOUNTER
Last colonoscopy 11/29/22    Personal hx polyps  No family hx polyps or cx    Asa or blood thinners:  Aspirin  Meloxicam  Ibuprofen    List medications:  Metoprolol  Omeprazole  Levothyroxine  Pravastatin  Gabapentin  Multi vitamin    OA form scanned in media

## 2025-07-31 DIAGNOSIS — M54.16 LUMBAR RADICULOPATHY: ICD-10-CM

## 2025-07-31 RX ORDER — GABAPENTIN 300 MG/1
300 CAPSULE ORAL 3 TIMES DAILY
Qty: 90 CAPSULE | Refills: 0 | Status: SHIPPED | OUTPATIENT
Start: 2025-07-31

## 2025-08-01 ENCOUNTER — OFFICE VISIT (OUTPATIENT)
Dept: PAIN MEDICINE | Facility: CLINIC | Age: 75
End: 2025-08-01
Payer: MEDICARE

## 2025-08-01 ENCOUNTER — PREP FOR SURGERY (OUTPATIENT)
Dept: OTHER | Facility: HOSPITAL | Age: 75
End: 2025-08-01
Payer: MEDICARE

## 2025-08-01 VITALS
RESPIRATION RATE: 16 BRPM | DIASTOLIC BLOOD PRESSURE: 81 MMHG | OXYGEN SATURATION: 96 % | SYSTOLIC BLOOD PRESSURE: 154 MMHG | HEART RATE: 65 BPM | WEIGHT: 150.4 LBS | HEIGHT: 61 IN | BODY MASS INDEX: 28.4 KG/M2 | TEMPERATURE: 97.1 F

## 2025-08-01 DIAGNOSIS — Z98.890 STATUS POST LUMBAR SPINE SURGERY FOR DECOMPRESSION OF SPINAL CORD: ICD-10-CM

## 2025-08-01 DIAGNOSIS — Z86.0101 HISTORY OF ADENOMATOUS POLYP OF COLON: ICD-10-CM

## 2025-08-01 DIAGNOSIS — M54.16 LUMBAR RADICULOPATHY: Primary | ICD-10-CM

## 2025-08-01 DIAGNOSIS — Z12.11 ENCOUNTER FOR SCREENING FOR MALIGNANT NEOPLASM OF COLON: Primary | ICD-10-CM

## 2025-08-01 DIAGNOSIS — M48.061 SPINAL STENOSIS OF LUMBAR REGION WITH RADICULOPATHY: ICD-10-CM

## 2025-08-01 DIAGNOSIS — M54.16 SPINAL STENOSIS OF LUMBAR REGION WITH RADICULOPATHY: ICD-10-CM

## 2025-08-01 NOTE — PROGRESS NOTES
CHIEF COMPLAINT  Lumbar Epidural Steroid Injection   Patient reports 60% pain relief complaining of right foot tingling comes and goes.    Subjective   Imani Shankar is a 74 y.o. female  who presents to the office for follow-up of procedure.  She completed a LESI L3-4   on  7-2-25 performed by Dr. VALDEZ for management of BACK AND LEG PAIN. Patient reports 60% ONGOING relief from the procedure. Reports 60% relief in pain in her back.  She did notice right leg pain improvement for 3-4 days.     Complains of pain in her low back, right hip, thigh and also right foot/ankle. Reports the right foot/ankle is moreso a tingling sensation and not painful. She reports that prior to surgery, She did have significant right leg pain and could go down to right ankle/foot. This was improved after surgery.  Today her pain is 3-4/10VAS. Pain increases with walking, standing, household chores; pain decreases with rest, sitting, backbrace. Pain generally does not interfere with sleep, but she does still notice it. At last office visit, she was given a prescription of Gabapentin 300 mg 2/day( 3/day can make her drowsy). Continue with Meloxicam 15 mg daily (PCP). No longer taking muscle relaxant(tizanidine). ADL's by self.  Denies any bowel or bladder incontinence.     Is pending an appointment with Dr Terrazas 8-13-25.    Was at [facility] in late May/early June. Had EMG. Reports it was normal.  Presents with son.    History of Present Illness     Patient was last evaluated the office by ALICIA De Jesus on 6/17/2025--complaints of lower back pain.  Notices return of right anterior lateral leg pain at this time.  Pain is aggravated primarily by standing and walking and improves with sitting down to rest.  Is taking gabapentin 300 mg 3 times daily which helps some.  It does occasionally make her drowsy.  Also continues with meloxicam 15 mg daily by PCP and tizanidine 4 mg as needed and Tylenol as needed.  Has recently started PT  and has noticed some improvement  but nothing significant.  Plan is for LESI at L3-L4.  Plan to continue gabapentin.  Routine UDS today as part of gabapentin prescription.  Follow-up for procedure.    Procedures ---  LESI L3-4-- 7-2-25-- 60% ongoing relief for back pain  Bilateral SI joint injection on 4/22/2025 --- 25% relief   bilateral L3 LTFESI   on  3/5/2025 --- 70% relief from the procedure x 3 months (leg pain only, did not help back, left leg still ok)     Pertinent surgical history---  12/17/2024 - lumbar laminectomy decompression/discectomy L4/L5 and L5/S1 (Dr. Terrazas)    MRI LUMBAR SPINE W WO CONTRAST-     HISTORY:  worsening radiculopathy following lumbar surgery;  [id number] for follow-up examination after completed treatment for  conditions other than malignant neoplasm; M54.16-Radiculopathy, lumbar  region     COMPARISON: MRI lumbar spine 12/4/2024.     FINDINGS: There is moderate loss of disc height with disc desiccation at  L1-L2 and L2-3. Grade 1 retrolisthesis of L2 upon 3 and to lesser extent  L1 upon L2 is appreciated which appears unchanged. The conus is at L1  and the caudal aspect of the spinal cord appears unremarkable.     L1-L2: There is mild flattening of ventral surface of the thecal sac  minimal broad-based disc-osteophyte complex and the retrolisthesis,  unchanged. Mild foraminal stenosis is present on the left secondary to  loss of disc height and the retrolisthesis of L1 upon L2.     L2-L3: There is mild canal stenosis and lateral recess narrowing  bilaterally secondary to retrolisthesis of L2 upon L3, broad-based disc  osteophyte complex and mild facet degenerative disease.     L3-L4: There is moderate canal stenosis and lateral recess narrowing  bilaterally secondary to posterior element degenerative disease and a  broad-based disc osteophyte complex. This is accentuated by somewhat  congenitally shortened pedicles. Mild to moderate foraminal stenosis is  present  bilaterally, unchanged.     L4-L5: A decompressive laminectomy is noted. There is mild thickening  and enhancement of the cauda equina. There is circumferential epidural  enhancement. Mild foraminal stenosis is present on the right secondary  to extension of the disc osteophyte complex into the neural foramen.     L5-S1: Mild facet degenerative disease is present bilaterally. There is  epidural enhancement anterior, anterolateral, lateral and posterior to  the thecal sac consistent with granulation tissue. There is mild  thickening and enhancement of the cauda equina.     IMPRESSION:  The patient has undergone decompressive laminectomies at  L4-L5 and L5-S1. There is no evidence of canal stenosis at L4-L5 or  L5-S1. There is mild thickening and enhancement of the nerve roots.  Arachnoiditis cannot be excluded. There is circumferential enhancing  tissue at L4-L5 and L5-S1 which abuts the exiting nerve roots consistent  with granulation tissue. There is no evidence of a focal herniation.  Degenerative disease involving the lumbar spine is noted as described  above including moderate canal stenosis at L3-L4, loss of disc height  and retrolisthesis of L2 upon L3 and to a lesser extent L1 upon L2. See  above.     This report was finalized on 1/31/2025 2:54 PM by Dr. Wallace William M.D  on Workstation: [id number]        Imaging    MRI Lumbar Spine With & Without Contrast (Order: [id number]) - 1/30/2025  Result History    MRI Lumbar Spine With & Without Contrast (Order #[id number]) on 1/31/2025 - Order Result History Report  Signed    Electronically signed by Wallace William MD on 1/31/25 at 1454 EST     Reprint Order Requisition    MRI Lumbar Spine With & Without Contrast (Order #[id number]) on 1/30/25       MRI Lumbar Spine With & Without Contrast: Result Notes     Elizabeth JANY Jama, ALICIA  2/12/2025  7:47 PM EST       I have her returning to the office to see you after she gets a transforaminal injection.  Her son is  "asking that you see her.    Elizabeth CARMICHAEL Wiley, ALICIA  2/3/2025  8:00 PM EST       Could you please review this MRI lumbar spine?  I saw her in the office and she is still having intense leg pain.  Just wondering about the epidural enhancement L5-S1 and cauda equina enhancement L4-5.  She has a history of lymphoma as well-FYI            [facility]            PEG Assessment   What number best describes your pain on average in the past week?5  What number best describes how, during the past week, pain has interfered with your enjoyment of life?5  What number best describes how, during the past week, pain has interfered with your general activity?  9    The following portions of the patient's history were reviewed and updated as appropriate: allergies, current medications, past family history, past medical history, past social history, past surgical history, and problem list.    Review of Systems   Constitutional:  Negative for activity change and fever.   Gastrointestinal:  Negative for constipation and diarrhea.   Genitourinary:  Negative for difficulty urinating.   Musculoskeletal:  Positive for back pain.   Neurological:  Positive for numbness (right foot). Negative for dizziness, weakness and headaches.   Psychiatric/Behavioral:  Negative for agitation, sleep disturbance and suicidal ideas. The patient is not nervous/anxious.      I have reviewed and confirmed the accuracy of the ROS as documented by the MA/LPN/RN ALICIA Robbins     Vitals:    08/01/25 1420   BP: 154/81   BP Location: Left arm   Patient Position: Sitting   Cuff Size: Adult   Pulse: 65   Resp: 16   Temp: 97.1 °F (36.2 °C)   TempSrc: Temporal   SpO2: 96%   Weight: 68.2 kg (150 lb 6.4 oz)   Height: 154.9 cm (60.98\")   PainSc: 4      Objective   Physical Exam  Constitutional:       Appearance: She is well-developed.   HENT:      Head: Normocephalic and atraumatic.   Pulmonary:      Effort: Pulmonary effort is normal.   Musculoskeletal:      " Lumbar back: Tenderness present. No bony tenderness. Decreased range of motion. Negative right straight leg raise test and negative left straight leg raise test.        Back:       Comments: +HERMELINDA on right, negative on left   Neurological:      Mental Status: She is alert and oriented to person, place, and time.      Deep Tendon Reflexes:      Reflex Scores:       Patellar reflexes are 1+ on the right side and 2+ on the left side.       Achilles reflexes are 1+ on the right side and 1+ on the left side.  Psychiatric:         Speech: Speech normal.         Behavior: Behavior normal.         Thought Content: Thought content normal.         Judgment: Judgment normal.         Assessment & Plan   Diagnoses and all orders for this visit:    1. Lumbar radiculopathy (Primary)  -     MRI Lumbar Spine With & Without Contrast; Future    2. Spinal stenosis of lumbar region with radiculopathy  -     MRI Lumbar Spine With & Without Contrast; Future    3. Status post lumbar spine surgery for decompression of spinal cord  -     MRI Lumbar Spine With & Without Contrast; Future        Imani Shankar reports a pain score of 4.  Given her pain assessment as noted, treatment options were discussed and the following options were decided upon as a follow-up plan to address the patient's pain: obtaining imaging, continue with neurosurgical evaluation.    --- MRI lumbar spine with and without contrast.  --- Continue with plans for neurosurgical evaluation. Pending appt with Dr Terrazas  --- discussed potential for target epidural TFLESI Right L3,L5(Approximate) versus SI joint injection. Will hold at this time and await MRI and TED evaluation. Patient and son agree.  --- Did discuss potential for repeat EMG since tingling near right ankle/foot started 7-12-25. Will hold at this time.   --- She did ask about a round of steroids. Reviewed risks related to over-exposure and wanting to limit until after MRI and TED evaluation. She and son  agreed.   --- Follow-up 6 weeks or sooner if needed.         ROBERT REPORT  ROBERT report has been reviewed and scanned into the patient's chart.    As the clinician, I personally reviewed the ROBERT from 8-1-25 while the patient was in the office today.      I spent 31 minutes caring for patient on this date of service. This time includes time spent by me in the following activities:reviewing tests, obtaining and/or reviewing a separately obtained history, performing a medically appropriate examination and/or evaluation , counseling and educating the patient/family/caregiver, ordering medications, tests, or procedures and documenting information in the medical record       Dictated utilizing Dragon dictation.

## 2025-08-05 ENCOUNTER — TELEPHONE (OUTPATIENT)
Dept: GASTROENTEROLOGY | Facility: CLINIC | Age: 75
End: 2025-08-05
Payer: MEDICARE

## 2025-08-06 ENCOUNTER — HOSPITAL ENCOUNTER (OUTPATIENT)
Dept: MRI IMAGING | Facility: HOSPITAL | Age: 75
Discharge: HOME | End: 2025-08-06
Admitting: NURSE PRACTITIONER
Payer: MEDICARE

## 2025-08-06 DIAGNOSIS — M48.061 SPINAL STENOSIS OF LUMBAR REGION WITH RADICULOPATHY: ICD-10-CM

## 2025-08-06 DIAGNOSIS — M54.16 SPINAL STENOSIS OF LUMBAR REGION WITH RADICULOPATHY: ICD-10-CM

## 2025-08-06 DIAGNOSIS — M54.16 LUMBAR RADICULOPATHY: ICD-10-CM

## 2025-08-06 DIAGNOSIS — Z98.890 STATUS POST LUMBAR SPINE SURGERY FOR DECOMPRESSION OF SPINAL CORD: ICD-10-CM

## 2025-08-06 PROCEDURE — A9577 INJ MULTIHANCE: HCPCS | Performed by: NURSE PRACTITIONER

## 2025-08-06 PROCEDURE — 72158 MRI LUMBAR SPINE W/O & W/DYE: CPT

## 2025-08-06 PROCEDURE — 25510000002 GADOBENATE DIMEGLUMINE 529 MG/ML SOLUTION: Performed by: NURSE PRACTITIONER

## 2025-08-06 RX ADMIN — GADOBENATE DIMEGLUMINE 14 ML: 529 INJECTION, SOLUTION INTRAVENOUS at 17:15

## 2025-08-12 ENCOUNTER — PREP FOR SURGERY (OUTPATIENT)
Dept: OTHER | Facility: HOSPITAL | Age: 75
End: 2025-08-12
Payer: MEDICARE

## 2025-08-12 DIAGNOSIS — M54.16 SPINAL STENOSIS OF LUMBAR REGION WITH RADICULOPATHY: Primary | ICD-10-CM

## 2025-08-12 DIAGNOSIS — M48.061 SPINAL STENOSIS OF LUMBAR REGION WITH RADICULOPATHY: Primary | ICD-10-CM

## 2025-08-12 DIAGNOSIS — M71.38 SYNOVIAL CYST OF LUMBAR SPINE: ICD-10-CM

## 2025-08-13 ENCOUNTER — OFFICE VISIT (OUTPATIENT)
Dept: NEUROSURGERY | Facility: CLINIC | Age: 75
End: 2025-08-13
Payer: MEDICARE

## 2025-08-13 VITALS
TEMPERATURE: 97.8 F | BODY MASS INDEX: 28.51 KG/M2 | HEART RATE: 70 BPM | SYSTOLIC BLOOD PRESSURE: 142 MMHG | DIASTOLIC BLOOD PRESSURE: 68 MMHG | WEIGHT: 151 LBS | OXYGEN SATURATION: 100 % | HEIGHT: 61 IN

## 2025-08-13 DIAGNOSIS — M51.362 DEGENERATION OF INTERVERTEBRAL DISC OF LUMBAR REGION WITH DISCOGENIC BACK PAIN AND LOWER EXTREMITY PAIN: ICD-10-CM

## 2025-08-13 DIAGNOSIS — M47.816 LUMBAR FACET JOINT SYNDROME: ICD-10-CM

## 2025-08-13 DIAGNOSIS — M71.30 SYNOVIAL CYST: Primary | ICD-10-CM

## 2025-08-13 DIAGNOSIS — M48.061 SPINAL STENOSIS OF LUMBAR REGION WITHOUT NEUROGENIC CLAUDICATION: ICD-10-CM

## 2025-08-13 DIAGNOSIS — Z98.890 HISTORY OF LUMBAR SURGERY: ICD-10-CM

## 2025-08-13 PROCEDURE — 1159F MED LIST DOCD IN RCRD: CPT | Performed by: NEUROLOGICAL SURGERY

## 2025-08-13 PROCEDURE — 99214 OFFICE O/P EST MOD 30 MIN: CPT | Performed by: NEUROLOGICAL SURGERY

## 2025-08-13 PROCEDURE — 1160F RVW MEDS BY RX/DR IN RCRD: CPT | Performed by: NEUROLOGICAL SURGERY

## 2025-08-14 ENCOUNTER — PREP FOR SURGERY (OUTPATIENT)
Dept: OTHER | Facility: HOSPITAL | Age: 75
End: 2025-08-14
Payer: MEDICARE

## 2025-08-14 DIAGNOSIS — M47.816 LUMBAR FACET JOINT SYNDROME: Primary | ICD-10-CM

## 2025-08-19 ENCOUNTER — TRANSCRIBE ORDERS (OUTPATIENT)
Dept: SURGERY | Facility: SURGERY CENTER | Age: 75
End: 2025-08-19
Payer: MEDICARE

## 2025-08-19 ENCOUNTER — PREP FOR SURGERY (OUTPATIENT)
Dept: SURGERY | Facility: SURGERY CENTER | Age: 75
End: 2025-08-19
Payer: MEDICARE

## 2025-08-19 ENCOUNTER — TELEPHONE (OUTPATIENT)
Dept: PAIN MEDICINE | Facility: CLINIC | Age: 75
End: 2025-08-19
Payer: MEDICARE

## 2025-08-19 DIAGNOSIS — M47.816 LUMBAR FACET ARTHROPATHY: Primary | ICD-10-CM

## 2025-08-19 DIAGNOSIS — Z41.9 SURGERY, ELECTIVE: Primary | ICD-10-CM

## 2025-08-19 RX ORDER — DIAZEPAM 5 MG/1
10 TABLET ORAL ONCE
OUTPATIENT
Start: 2025-08-19

## 2025-08-21 ENCOUNTER — TRANSCRIBE ORDERS (OUTPATIENT)
Dept: SURGERY | Facility: SURGERY CENTER | Age: 75
End: 2025-08-21
Payer: MEDICARE

## 2025-08-21 DIAGNOSIS — Z41.9 SURGERY, ELECTIVE: Primary | ICD-10-CM

## 2025-08-27 DIAGNOSIS — M54.16 LEFT LUMBAR RADICULOPATHY: ICD-10-CM

## 2025-08-27 RX ORDER — TRAMADOL HYDROCHLORIDE 50 MG/1
50 TABLET ORAL EVERY 6 HOURS
Qty: 120 TABLET | Refills: 0 | Status: SHIPPED | OUTPATIENT
Start: 2025-08-27

## 2025-08-29 ENCOUNTER — OFFICE VISIT (OUTPATIENT)
Dept: ONCOLOGY | Facility: CLINIC | Age: 75
End: 2025-08-29
Payer: MEDICARE

## 2025-08-29 ENCOUNTER — LAB (OUTPATIENT)
Dept: LAB | Facility: HOSPITAL | Age: 75
End: 2025-08-29
Payer: MEDICARE

## 2025-08-29 VITALS
HEART RATE: 63 BPM | SYSTOLIC BLOOD PRESSURE: 129 MMHG | TEMPERATURE: 98.3 F | OXYGEN SATURATION: 98 % | BODY MASS INDEX: 28.77 KG/M2 | WEIGHT: 152.4 LBS | DIASTOLIC BLOOD PRESSURE: 75 MMHG | HEIGHT: 61 IN

## 2025-08-29 DIAGNOSIS — C83.00 SMALL LYMPHOCYTIC LYMPHOMA: ICD-10-CM

## 2025-08-29 DIAGNOSIS — D50.9 IRON DEFICIENCY ANEMIA, UNSPECIFIED IRON DEFICIENCY ANEMIA TYPE: ICD-10-CM

## 2025-08-29 DIAGNOSIS — D64.9 NORMOCYTIC ANEMIA: Primary | ICD-10-CM

## 2025-08-29 LAB
ALBUMIN SERPL-MCNC: 4.1 G/DL (ref 3.5–5.2)
ALBUMIN/GLOB SERPL: 1.6 G/DL
ALP SERPL-CCNC: 140 U/L (ref 39–117)
ALT SERPL W P-5'-P-CCNC: 19 U/L (ref 1–33)
ANION GAP SERPL CALCULATED.3IONS-SCNC: 13.9 MMOL/L (ref 5–15)
AST SERPL-CCNC: 22 U/L (ref 1–32)
BASOPHILS # BLD AUTO: 0.05 10*3/MM3 (ref 0–0.2)
BASOPHILS NFR BLD AUTO: 0.8 % (ref 0–1.5)
BILIRUB SERPL-MCNC: 0.4 MG/DL (ref 0–1.2)
BUN SERPL-MCNC: 15 MG/DL (ref 8–23)
BUN/CREAT SERPL: 21.1 (ref 7–25)
CALCIUM SPEC-SCNC: 9.4 MG/DL (ref 8.6–10.5)
CHLORIDE SERPL-SCNC: 105 MMOL/L (ref 98–107)
CO2 SERPL-SCNC: 22.1 MMOL/L (ref 22–29)
CREAT SERPL-MCNC: 0.71 MG/DL (ref 0.57–1)
DEPRECATED RDW RBC AUTO: 42.1 FL (ref 37–54)
EGFRCR SERPLBLD CKD-EPI 2021: 88.8 ML/MIN/1.73
EOSINOPHIL # BLD AUTO: 0.48 10*3/MM3 (ref 0–0.4)
EOSINOPHIL NFR BLD AUTO: 7.7 % (ref 0.3–6.2)
ERYTHROCYTE [DISTWIDTH] IN BLOOD BY AUTOMATED COUNT: 13.8 % (ref 12.3–15.4)
FERRITIN SERPL-MCNC: 18.9 NG/ML (ref 13–150)
FOLATE SERPL-MCNC: 12.8 NG/ML (ref 4.78–24.2)
GLOBULIN UR ELPH-MCNC: 2.6 GM/DL
GLUCOSE SERPL-MCNC: 100 MG/DL (ref 65–99)
HCT VFR BLD AUTO: 34.7 % (ref 34–46.6)
HGB BLD-MCNC: 11.5 G/DL (ref 12–15.9)
IMM GRANULOCYTES # BLD AUTO: 0.01 10*3/MM3 (ref 0–0.05)
IMM GRANULOCYTES NFR BLD AUTO: 0.2 % (ref 0–0.5)
IRON 24H UR-MRATE: 49 MCG/DL (ref 37–145)
IRON SATN MFR SERPL: 9 % (ref 20–50)
LDH SERPL-CCNC: 189 U/L (ref 135–214)
LYMPHOCYTES # BLD AUTO: 1.88 10*3/MM3 (ref 0.7–3.1)
LYMPHOCYTES NFR BLD AUTO: 30.1 % (ref 19.6–45.3)
MCH RBC QN AUTO: 27.7 PG (ref 26.6–33)
MCHC RBC AUTO-ENTMCNC: 33.1 G/DL (ref 31.5–35.7)
MCV RBC AUTO: 83.6 FL (ref 79–97)
MONOCYTES # BLD AUTO: 0.87 10*3/MM3 (ref 0.1–0.9)
MONOCYTES NFR BLD AUTO: 13.9 % (ref 5–12)
NEUTROPHILS NFR BLD AUTO: 2.96 10*3/MM3 (ref 1.7–7)
NEUTROPHILS NFR BLD AUTO: 47.3 % (ref 42.7–76)
NRBC BLD AUTO-RTO: 0 /100 WBC (ref 0–0.2)
PLATELET # BLD AUTO: 234 10*3/MM3 (ref 140–450)
PMV BLD AUTO: 9.9 FL (ref 6–12)
POTASSIUM SERPL-SCNC: 4.2 MMOL/L (ref 3.5–5.2)
PROT SERPL-MCNC: 6.7 G/DL (ref 6–8.5)
RBC # BLD AUTO: 4.15 10*6/MM3 (ref 3.77–5.28)
SODIUM SERPL-SCNC: 141 MMOL/L (ref 136–145)
TIBC SERPL-MCNC: 532 MCG/DL (ref 298–536)
TRANSFERRIN SERPL-MCNC: 357 MG/DL (ref 200–360)
VIT B12 BLD-MCNC: 692 PG/ML (ref 211–946)
WBC NRBC COR # BLD AUTO: 6.25 10*3/MM3 (ref 3.4–10.8)

## 2025-08-29 PROCEDURE — 84466 ASSAY OF TRANSFERRIN: CPT | Performed by: INTERNAL MEDICINE

## 2025-08-29 PROCEDURE — 1126F AMNT PAIN NOTED NONE PRSNT: CPT | Performed by: INTERNAL MEDICINE

## 2025-08-29 PROCEDURE — 82607 VITAMIN B-12: CPT | Performed by: INTERNAL MEDICINE

## 2025-08-29 PROCEDURE — 83615 LACTATE (LD) (LDH) ENZYME: CPT

## 2025-08-29 PROCEDURE — 82728 ASSAY OF FERRITIN: CPT | Performed by: INTERNAL MEDICINE

## 2025-08-29 PROCEDURE — 82746 ASSAY OF FOLIC ACID SERUM: CPT | Performed by: INTERNAL MEDICINE

## 2025-08-29 PROCEDURE — 1160F RVW MEDS BY RX/DR IN RCRD: CPT | Performed by: INTERNAL MEDICINE

## 2025-08-29 PROCEDURE — 36415 COLL VENOUS BLD VENIPUNCTURE: CPT

## 2025-08-29 PROCEDURE — 83540 ASSAY OF IRON: CPT | Performed by: INTERNAL MEDICINE

## 2025-08-29 PROCEDURE — 1159F MED LIST DOCD IN RCRD: CPT | Performed by: INTERNAL MEDICINE

## 2025-08-29 PROCEDURE — 99214 OFFICE O/P EST MOD 30 MIN: CPT | Performed by: INTERNAL MEDICINE

## 2025-08-29 PROCEDURE — 80053 COMPREHEN METABOLIC PANEL: CPT

## 2025-08-29 PROCEDURE — 85025 COMPLETE CBC W/AUTO DIFF WBC: CPT

## (undated) DEVICE — ANTIBACTERIAL UNDYED BRAIDED (POLYGLACTIN 910), SYNTHETIC ABSORBABLE SUTURE: Brand: COATED VICRYL

## (undated) DEVICE — BITEBLOCK OMNI BLOC

## (undated) DEVICE — GLV SURG TRIUMPH PF LTX 7.5 STRL

## (undated) DEVICE — 5.5 MM ACROMIONIZER STRAIGHT                                    BURRS, POWER/EP-1, BROWN, 8000                                    MAXIMUM RPM, PACKAGED 6 PER BOX, STERILE

## (undated) DEVICE — NDL SPINE 22G 31/2IN BLK

## (undated) DEVICE — STPLR SKIN VISISTAT WD 35CT

## (undated) DEVICE — GLV SURG PREMIERPRO ORTHO LTX PF SZ8 BRN

## (undated) DEVICE — DRP MICROSCP LEICA 137X381CM

## (undated) DEVICE — NEEDLE, QUINCKE 22GX3.5": Brand: MEDLINE INDUSTRIES, INC.

## (undated) DEVICE — ADAPT CLN BIOGUARD AIR/H2O DISP

## (undated) DEVICE — CANN O2 ETCO2 FITS ALL CONN CO2 SMPL A/ 7IN DISP LF

## (undated) DEVICE — TUBING, SUCTION, 1/4" X 10', STRAIGHT: Brand: MEDLINE

## (undated) DEVICE — PK ARTHSCP SHLDR TOWER 40

## (undated) DEVICE — CANN TWIST IN W/NOSQUIRT CAP DAM/TRPL 7MM 7CM

## (undated) DEVICE — TOOL MR8-14MH30 MR8 14CM MATCH 3MM: Brand: MIDAS REX MR8

## (undated) DEVICE — PK NEURO SPINE 40

## (undated) DEVICE — IMMOB SHLDR PAD2 UNIV SM

## (undated) DEVICE — DISPOSABLE BIPOLAR FORCEPS 7 3/4" (19.7CM) SCOVILLE BAYONET, 1.5MM TIP AND 12 FT. (3.6M) CABLE: Brand: KIRWAN

## (undated) DEVICE — COVER,C-ARM,41X74: Brand: MEDLINE

## (undated) DEVICE — LN SMPL CO2 SHTRM SD STREAM W/M LUER

## (undated) DEVICE — GLV SURG BIOGEL LTX PF 8

## (undated) DEVICE — GLV SURG SENSICARE MICRO PF LF 6.5 STRL

## (undated) DEVICE — SKIN PREP TRAY W/CHG: Brand: MEDLINE INDUSTRIES, INC.

## (undated) DEVICE — SUT VIC 4/0 P3 18IN J494G

## (undated) DEVICE — SYR CONTRL LUERLOK 10CC

## (undated) DEVICE — MARKR SKIN W/RULR 2TP

## (undated) DEVICE — SENSR O2 OXIMAX FNGR A/ 18IN NONSTR

## (undated) DEVICE — KT ORCA ORCAPOD DISP STRL

## (undated) DEVICE — CANN NASL CO2 TRULINK W/O2 A/

## (undated) DEVICE — SUT PROLN 4/0 FS2 18IN 8683G

## (undated) DEVICE — GOWN,NON-REINFORCED,SIRUS,SET IN SLV,XXL: Brand: MEDLINE

## (undated) DEVICE — EPIDURAL TRAY: Brand: MEDLINE INDUSTRIES, INC.

## (undated) DEVICE — DRSNG WND GZ PAD BORDERED 4X8IN STRL

## (undated) DEVICE — GLV SURG SENSICARE PI MIC PF SZ6.5 LF STRL

## (undated) DEVICE — GLV SURG BIOGEL LTX PF 6 1/2

## (undated) DEVICE — RESUSCITATOR,MANUAL,ADLT,MASK,TUBE RES: Brand: MEDLINE INDUSTRIES, INC.

## (undated) DEVICE — APPL CHLORAPREP HI/LITE 26ML ORNG

## (undated) DEVICE — LABEL SHEET CUSTOM 2X2 YELLOW: Brand: MEDLINE INDUSTRIES, INC.

## (undated) DEVICE — VULCAN GENERATOR ADAPTER,PACKAGED                                    3 PER BOX, STERILE. REQUIRED TO                                    OPERATE 7205962, PACKAGED                                    NON-STERILE, REUSABLE

## (undated) DEVICE — FRCP BX RADJAW4 NDL 2.8 240CM LG OG BX40

## (undated) DEVICE — OCCLUSIVE GAUZE STRIP,3% BISMUTH TRIBROMOPHENATE IN PETROLATUM BLEND: Brand: XEROFORM

## (undated) DEVICE — SNAR POLYP CAPTIVATOR RND STFF 2.4 240CM 10MM 1P/U

## (undated) DEVICE — TOOL MR8-14BA50C MR8 14CM BALL CARB 5MM: Brand: MIDAS REX MR8

## (undated) DEVICE — SYR LL TP 10ML STRL

## (undated) DEVICE — THE SINGLE USE ETRAP – POLYP TRAP IS USED FOR SUCTION RETRIEVAL OF ENDOSCOPICALLY REMOVED POLYPS.: Brand: ETRAP

## (undated) DEVICE — TBG PUMP ARTHSCP MAIN AR6400 16FT

## (undated) DEVICE — Device: Brand: DEFENDO AIR/WATER/SUCTION AND BIOPSY VALVE

## (undated) DEVICE — GLV SURG BIOGEL LTX PF 8 1/2

## (undated) DEVICE — 3M™ STERI-STRIP™ ANTIMICROBIAL SKIN CLOSURES 1/2 INCH X 4 INCHES 50/CARTON 4 CARTONS/CASE A1847: Brand: 3M™ STERI-STRIP™

## (undated) DEVICE — PCH INST SURG INVISISHIELD 2PCKT

## (undated) DEVICE — BIT DRL JUGGERKNOT 2.9MM

## (undated) DEVICE — TP NDL SCORPION MULTIFIRE

## (undated) DEVICE — GLV SURG SENSICARE POLYISPRN W/ALOE PF LF 6.5 GRN STRL

## (undated) DEVICE — SINGLE-USE BIOPSY FORCEPS: Brand: RADIAL JAW 4